# Patient Record
Sex: FEMALE | Race: WHITE | NOT HISPANIC OR LATINO | Employment: OTHER | URBAN - METROPOLITAN AREA
[De-identification: names, ages, dates, MRNs, and addresses within clinical notes are randomized per-mention and may not be internally consistent; named-entity substitution may affect disease eponyms.]

---

## 2019-03-19 DIAGNOSIS — I34.0 MITRAL VALVE INSUFFICIENCY, UNSPECIFIED ETIOLOGY: Primary | ICD-10-CM

## 2019-04-08 ENCOUNTER — HOSPITAL ENCOUNTER (OUTPATIENT)
Dept: CARDIOLOGY | Facility: CLINIC | Age: 64
Discharge: HOME | End: 2019-04-08
Attending: INTERNAL MEDICINE
Payer: MEDICARE

## 2019-04-08 ENCOUNTER — OFFICE VISIT (OUTPATIENT)
Dept: CARDIOLOGY | Facility: CLINIC | Age: 64
End: 2019-04-08
Payer: MEDICARE

## 2019-04-08 VITALS
SYSTOLIC BLOOD PRESSURE: 128 MMHG | BODY MASS INDEX: 26.06 KG/M2 | DIASTOLIC BLOOD PRESSURE: 70 MMHG | HEIGHT: 67 IN | WEIGHT: 166 LBS | HEART RATE: 73 BPM

## 2019-04-08 VITALS
SYSTOLIC BLOOD PRESSURE: 132 MMHG | BODY MASS INDEX: 28.25 KG/M2 | WEIGHT: 169.56 LBS | OXYGEN SATURATION: 98 % | HEIGHT: 65 IN | DIASTOLIC BLOOD PRESSURE: 76 MMHG | HEART RATE: 79 BPM

## 2019-04-08 DIAGNOSIS — Z98.890 S/P MITRAL VALVE REPAIR: ICD-10-CM

## 2019-04-08 DIAGNOSIS — I34.0 MITRAL VALVE INSUFFICIENCY, UNSPECIFIED ETIOLOGY: ICD-10-CM

## 2019-04-08 LAB
ASCENDING AORTA: 3.82 CM
AV INDEX (PROSTH): 0.86
AV MEAN GRADIENT: 2.59 MMHG
AV PEAK GRADIENT: 4.67 MMHG
AV VALVE AREA: 3.89 CM2
AV VELOCITY RATIO: 0.87
BSA FOR ECHO PROCEDURE: 1.89 M2
CV ECHO LV RWT: 0.29 CM
DOP CALC AO PEAK VEL: 1.08 M/S
DOP CALC AO VTI: 21.45 CM
DOP CALC LVOT AREA: 4.52 CM2
DOP CALC LVOT DIAMETER: 2.4 CM
DOP CALC LVOT PEAK VEL: 0.94 M/S
DOP CALC LVOT STROKE VOLUME: 83.47 CM3
DOP CALCLVOT PEAK VEL VTI: 18.46 CM
E WAVE DECELERATION TIME: 297.16 MSEC
E/A RATIO: 1.12
E/E' RATIO: 21.56
ECHO LV POSTERIOR WALL: 0.75 CM (ref 0.6–1.1)
FRACTIONAL SHORTENING: 38 % (ref 28–44)
INTERVENTRICULAR SEPTUM: 0.94 CM (ref 0.6–1.1)
LA MAJOR: 5.7 CM
LA MINOR: 5.7 CM
LA WIDTH: 4.9 CM
LEFT ATRIUM SIZE: 4.61 CM
LEFT ATRIUM VOLUME INDEX: 58.6 ML/M2
LEFT ATRIUM VOLUME: 109.44 CM3
LEFT INTERNAL DIMENSION IN SYSTOLE: 3.17 CM (ref 2.1–4)
LEFT VENTRICLE DIASTOLIC VOLUME INDEX: 66.48 ML/M2
LEFT VENTRICLE DIASTOLIC VOLUME: 124.2 ML
LEFT VENTRICLE MASS INDEX: 80.9 G/M2
LEFT VENTRICLE SYSTOLIC VOLUME INDEX: 21.4 ML/M2
LEFT VENTRICLE SYSTOLIC VOLUME: 39.94 ML
LEFT VENTRICULAR INTERNAL DIMENSION IN DIASTOLE: 5.11 CM (ref 3.5–6)
LEFT VENTRICULAR MASS: 151.19 G
LV LATERAL E/E' RATIO: 19.4
LV SEPTAL E/E' RATIO: 24.25
MV MEAN GRADIENT: 10 MMHG
MV PEAK A VEL: 1.73 M/S
MV PEAK E VEL: 1.94 M/S
MV PEAK GRADIENT: 17 MMHG
PISA TR MAX VEL: 2.93 M/S
PULM VEIN S/D RATIO: 1.08
PV PEAK D VEL: 0.61 M/S
PV PEAK S VEL: 0.66 M/S
RA MAJOR: 3.54 CM
RA PRESSURE: 8 MMHG
RA WIDTH: 3.14 CM
RIGHT VENTRICULAR END-DIASTOLIC DIMENSION: 3.08 CM
RV TISSUE DOPPLER FREE WALL SYSTOLIC VELOCITY 1 (APICAL 4 CHAMBER VIEW): 11.35 M/S
SINUS: 3.4 CM
STJ: 2.95 CM
TDI LATERAL: 0.1
TDI SEPTAL: 0.08
TDI: 0.09
TR MAX PG: 34.34 MMHG
TRICUSPID ANNULAR PLANE SYSTOLIC EXCURSION: 1.9 CM
TV REST PULMONARY ARTERY PRESSURE: 42 MMHG

## 2019-04-08 PROCEDURE — 99204 OFFICE O/P NEW MOD 45 MIN: CPT | Mod: S$PBB,,, | Performed by: INTERNAL MEDICINE

## 2019-04-08 PROCEDURE — 93306 TTE W/DOPPLER COMPLETE: CPT | Mod: PBBFAC | Performed by: INTERNAL MEDICINE

## 2019-04-08 PROCEDURE — 99999 PR PBB SHADOW E&M-EST. PATIENT-LVL IV: ICD-10-PCS | Mod: PBBFAC,,,

## 2019-04-08 PROCEDURE — 99999 PR PBB SHADOW E&M-EST. PATIENT-LVL IV: CPT | Mod: PBBFAC,,,

## 2019-04-08 PROCEDURE — 99214 OFFICE O/P EST MOD 30 MIN: CPT | Mod: PBBFAC,25

## 2019-04-08 PROCEDURE — 99204 PR OFFICE/OUTPT VISIT, NEW, LEVL IV, 45-59 MIN: ICD-10-PCS | Mod: S$PBB,,, | Performed by: INTERNAL MEDICINE

## 2019-04-08 PROCEDURE — 93306 TRANSTHORACIC ECHO (TTE) COMPLETE (CUPID ONLY): ICD-10-PCS | Mod: 26,S$PBB,, | Performed by: INTERNAL MEDICINE

## 2019-04-08 RX ORDER — POTASSIUM CHLORIDE 20 MEQ/1
20 TABLET, EXTENDED RELEASE ORAL DAILY
Refills: 6 | Status: ON HOLD | COMMUNITY
Start: 2019-03-20 | End: 2019-05-02 | Stop reason: HOSPADM

## 2019-04-08 RX ORDER — ACETAMINOPHEN 500 MG
2000 TABLET ORAL DAILY
COMMUNITY

## 2019-04-08 RX ORDER — METOPROLOL SUCCINATE 25 MG/1
25 TABLET, EXTENDED RELEASE ORAL DAILY
Refills: 1 | Status: ON HOLD | COMMUNITY
Start: 2019-03-27 | End: 2019-05-02 | Stop reason: HOSPADM

## 2019-04-08 RX ORDER — MULTIVITAMIN
1 TABLET ORAL DAILY
COMMUNITY

## 2019-04-08 RX ORDER — OMEPRAZOLE 40 MG/1
40 CAPSULE, DELAYED RELEASE ORAL EVERY MORNING
Refills: 1 | COMMUNITY
Start: 2019-01-30

## 2019-04-08 RX ORDER — ASPIRIN 81 MG/1
81 TABLET ORAL DAILY
COMMUNITY

## 2019-04-08 RX ORDER — MELOXICAM 15 MG/1
TABLET ORAL
Refills: 2 | Status: ON HOLD | COMMUNITY
Start: 2019-01-23 | End: 2019-05-02 | Stop reason: HOSPADM

## 2019-04-08 RX ORDER — FLUTICASONE PROPIONATE 50 MCG
SPRAY, SUSPENSION (ML) NASAL
Refills: 0 | COMMUNITY
Start: 2019-01-30

## 2019-04-08 RX ORDER — PROMETHAZINE HYDROCHLORIDE 25 MG/1
25 TABLET ORAL EVERY 6 HOURS PRN
COMMUNITY
Start: 2017-08-07

## 2019-04-08 RX ORDER — DICLOFENAC SODIUM 10 MG/G
2 GEL TOPICAL DAILY
COMMUNITY
Start: 2017-05-16

## 2019-04-08 RX ORDER — FUROSEMIDE 40 MG/1
40 TABLET ORAL 2 TIMES DAILY
Refills: 8 | Status: ON HOLD | COMMUNITY
Start: 2019-03-22 | End: 2019-05-02 | Stop reason: HOSPADM

## 2019-04-08 RX ORDER — TIZANIDINE 4 MG/1
TABLET ORAL
Refills: 6 | COMMUNITY
Start: 2019-01-23

## 2019-04-08 RX ORDER — ESTRADIOL 1 MG/1
1 TABLET ORAL DAILY
Refills: 3 | COMMUNITY
Start: 2019-01-18

## 2019-04-08 RX ORDER — LIDOCAINE 50 MG/G
OINTMENT TOPICAL
Refills: 0 | COMMUNITY
Start: 2019-01-29

## 2019-04-08 RX ORDER — HYDROCODONE BITARTRATE AND ACETAMINOPHEN 10; 325 MG/1; MG/1
1 TABLET ORAL EVERY 4 HOURS PRN
COMMUNITY
Start: 2018-05-16 | End: 2019-04-08

## 2019-04-08 RX ORDER — OXYCODONE AND ACETAMINOPHEN 10; 325 MG/1; MG/1
1 TABLET ORAL
Refills: 0 | Status: ON HOLD | COMMUNITY
Start: 2019-03-29 | End: 2019-05-02 | Stop reason: HOSPADM

## 2019-04-08 NOTE — PROGRESS NOTES
Subjective:    Patient ID:  Ericka Lewis is a 64 y.o. female who presents for evaluation of Mitral Stenosis post Mitral Valve Replacement.    Primary Cardiologist: Dr. Nunez    HPI  Ms. Lewis is a very pleasant lady who underwent mitral valve repair with a 28mm Physio II ring on 5/11/18 for severe MR with P2 prolapse. She states she never felt better after her surgery. She has chronic BLE edema, 2 -3 pillow orthopnea, and frequent coughing and HARE.     Echo today shows mitral valve that is s/p repair with no residual regurgitation but with a mean diastolic gradient of 10mm Hg at a heart rate of 73 bpm. The estimated PA systolic pressure is 42 mm Hg.    Review of Systems   Constitution: Negative for chills, diaphoresis, fever, weight gain and weight loss.   HENT: Negative for sore throat.    Eyes: Negative for blurred vision, vision loss in left eye, vision loss in right eye and visual disturbance.   Cardiovascular: Negative for chest pain, claudication, dyspnea on exertion, leg swelling, near-syncope, orthopnea, palpitations, paroxysmal nocturnal dyspnea and syncope.   Respiratory: Negative for cough, hemoptysis, shortness of breath, sputum production and wheezing.    Endocrine: Negative for cold intolerance and heat intolerance.   Hematologic/Lymphatic: Negative for adenopathy. Does not bruise/bleed easily.   Skin: Negative for rash.   Musculoskeletal: Negative for falls, muscle weakness and myalgias.   Gastrointestinal: Negative for abdominal pain, change in bowel habit, constipation, diarrhea, melena and nausea.   Genitourinary: Negative for bladder incontinence.   Neurological: Negative for dizziness, focal weakness, headaches, light-headedness, numbness and weakness.   Psychiatric/Behavioral: Negative for altered mental status.         Vitals:    04/08/19 1519 04/08/19 1530   BP: (!) 144/79 132/76   BP Location: Right arm Left arm   Patient Position: Sitting Sitting   BP Method: Large (Automatic) Large  "(Automatic)   Pulse: 79 79   SpO2: 98%    Weight: 76.9 kg (169 lb 8.5 oz)    Height: 5' 4.96" (1.65 m)    Body mass index is 28.25 kg/m².    Objective:    Physical Exam   Constitutional: She is oriented to person, place, and time. She appears well-developed and well-nourished. No distress.   HENT:   Head: Normocephalic and atraumatic.   Mouth/Throat: Oropharynx is clear and moist.   Eyes: Pupils are equal, round, and reactive to light. Conjunctivae and EOM are normal. No scleral icterus.   Neck: Neck supple. No JVD present. No tracheal deviation present.   Cardiovascular: Normal rate and regular rhythm. Exam reveals no gallop and no friction rub.   Murmur heard.   Low-pitched rumbling crescendo presystolic murmur is present with a grade of 2/6 at the apex.  Pulmonary/Chest: Effort normal. No respiratory distress. She has decreased breath sounds in the right lower field and the left lower field. She has no wheezes. She has no rales. She exhibits no tenderness.   Abdominal: Soft. Bowel sounds are normal. She exhibits no distension. There is no hepatosplenomegaly. There is no tenderness.   Musculoskeletal: She exhibits edema (1+ BLE edema). She exhibits no tenderness.   Neurological: She is alert and oriented to person, place, and time.   Skin: Skin is warm and dry. No rash noted. No erythema.   Psychiatric: She has a normal mood and affect. Her behavior is normal.         Assessment:         S/P mitral valve repair  S/p 28mm Physio II ring on 5/11/18 for severe MR with P2 prolapse.  Now with severe MR.     Plan:       Dr. Mcintosh will see her in his clinic on Thursday, 4/11 to discuss MVR.     Staff:  I have personally taken the history and examined this patient and agree with the fellow's note as stated above and amended it accordingly :-) This patient has severe MS post mitral valve repair and ring.  She needs MVR.  Dr. Mcintosh saw her in valve clinic with us and will arrange for her to have a preop visit.    "

## 2019-04-09 DIAGNOSIS — Z79.899 ENCOUNTER FOR LONG-TERM (CURRENT) USE OF MEDICATIONS: ICD-10-CM

## 2019-04-09 DIAGNOSIS — Z98.890 S/P MVR (MITRAL VALVE REPAIR): Primary | ICD-10-CM

## 2019-04-09 DIAGNOSIS — Z51.81 MONITORING FOR ANTICOAGULANT USE: ICD-10-CM

## 2019-04-09 DIAGNOSIS — Z79.01 MONITORING FOR ANTICOAGULANT USE: ICD-10-CM

## 2019-04-11 ENCOUNTER — OFFICE VISIT (OUTPATIENT)
Dept: CARDIOTHORACIC SURGERY | Facility: CLINIC | Age: 64
End: 2019-04-11
Payer: MEDICARE

## 2019-04-11 ENCOUNTER — LAB VISIT (OUTPATIENT)
Dept: LAB | Facility: HOSPITAL | Age: 64
End: 2019-04-11
Attending: THORACIC SURGERY (CARDIOTHORACIC VASCULAR SURGERY)
Payer: MEDICARE

## 2019-04-11 ENCOUNTER — DOCUMENTATION ONLY (OUTPATIENT)
Dept: CARDIOTHORACIC SURGERY | Facility: CLINIC | Age: 64
End: 2019-04-11

## 2019-04-11 VITALS
WEIGHT: 166.31 LBS | SYSTOLIC BLOOD PRESSURE: 118 MMHG | HEART RATE: 72 BPM | HEIGHT: 67 IN | TEMPERATURE: 98 F | OXYGEN SATURATION: 98 % | BODY MASS INDEX: 26.1 KG/M2 | DIASTOLIC BLOOD PRESSURE: 75 MMHG

## 2019-04-11 DIAGNOSIS — Z98.890 S/P MVR (MITRAL VALVE REPAIR): Primary | ICD-10-CM

## 2019-04-11 DIAGNOSIS — Z98.890 S/P MVR (MITRAL VALVE REPAIR): ICD-10-CM

## 2019-04-11 PROCEDURE — 99999 PR PBB SHADOW E&M-EST. PATIENT-LVL IV: ICD-10-PCS | Mod: PBBFAC,,, | Performed by: THORACIC SURGERY (CARDIOTHORACIC VASCULAR SURGERY)

## 2019-04-11 PROCEDURE — 99203 OFFICE O/P NEW LOW 30 MIN: CPT | Mod: S$PBB,ICN,CMP, | Performed by: THORACIC SURGERY (CARDIOTHORACIC VASCULAR SURGERY)

## 2019-04-11 PROCEDURE — 81001 URINALYSIS AUTO W/SCOPE: CPT

## 2019-04-11 PROCEDURE — 99214 OFFICE O/P EST MOD 30 MIN: CPT | Mod: PBBFAC | Performed by: THORACIC SURGERY (CARDIOTHORACIC VASCULAR SURGERY)

## 2019-04-11 PROCEDURE — 99203 PR OFFICE/OUTPT VISIT, NEW, LEVL III, 30-44 MIN: ICD-10-PCS | Mod: S$PBB,ICN,CMP, | Performed by: THORACIC SURGERY (CARDIOTHORACIC VASCULAR SURGERY)

## 2019-04-11 PROCEDURE — 99999 PR PBB SHADOW E&M-EST. PATIENT-LVL IV: CPT | Mod: PBBFAC,,, | Performed by: THORACIC SURGERY (CARDIOTHORACIC VASCULAR SURGERY)

## 2019-04-11 NOTE — LETTER
[facility]  [address]  [place] [address]  Phone: [phone] April 11, 2019      Mati Domínguez MD  [address] [place] [address]    Patient: Ericka Lewis   MR Number: [id number]   YOB: 1955   Date of Visit: 4/11/2019     Dear Dr. Domínguez,     It has been a privilege for us to see your patient, Ms. Lewis, at our [facility].  We had a chance to meet with her and went over the history, echocardiographic, as well as angiographic findings in detail.  As you know, she is a 64-year-old woman with a history of mitral valve prolapse s/p mitral valve repair (May 2018) and now with severe mitral stenosis, as well as paroxysmal atrial fibrillation.  Recently, she has been symptomatic with dyspnea on exertion, lower extremity edema, palpitations, and orthopnea symptoms interfering with her quality of life.  Her most recent echocardiogram (4/8/2019) showed 55% ejection fraction, severe mitral stenosis with mean transvalvular gradient of 10mmHg, no mitral regurgitation, trace tricuspid regurgitation, and pulmonary hypertension with estimated systolic PA pressure of 42mmHg.  Angiogram showed non-obstructive coronary artery disease with a right dominant system. CT chest noncontrast showed safe re-entry for reoperation.    We had an extensive discussion with her today regarding the ACC/AHA guidelines and we agreed that she has class I indications for surgery involving reoperative sternotomy, mitral valve replacement (bioprosthesis), possible mitral valve repair, possible tricuspid valve repair, and maze procedure with left atrial appendage removal. We went through the risks and benefits as well as the perioperative expectations and we agreed she is an appropriate surgical candidate.  We have tentatively scheduled her surgery for April 26, 2019, and I will be delighted to contact you around the time of her surgery.    We will  admit her on Sunday April 21, 2019, for medical optimization with diuresis, repeat transthoracic echo, and a right heart catheterization.     Thank you for referring Ms. Lewis and for your trust and confidence in our [facility].    Sincerely,         Yonatan Mcintosh MD  Cardiothoracic Surgery  [facility]    SS/etb

## 2019-04-11 NOTE — PROGRESS NOTES
"PREPARING FOR SURGERY  Your surgery has been scheduled for:   Day: Friday_________ Date:  _April 26th_______  Arrival Time: 5A  Start Time: 7A  You should report to the second floor surgery center, located on the [facility] side of the second floor of the [facility]. The phone number is [phone].     PLEASE NOTE  · If you are allergic to any medications, please inform your doctor or the nurse responsible for your care.  · Tell the doctor if you take aspirin, products containing aspirin, herbal medications or blood thinners, such as Coumadin, Pradaxa, or Plavix.  · Notify your doctor if you are diabetic and provide information about the medications you take.  · Arrange for someone to drive you home following surgery. You will not be allowed to leave the surgical facility alone or drive yourself home following sedation and anesthesia.  · If you have not already done so, please bring a list of your medications with you the day of your surgery.     BEFORE SURGERY  Stop taking all herbal medications 14 days prior to surgery  Stop taking aspirin, products containing aspirin 0 days before surgery  Stop taking blood thinners 5 days before surgery  Refrain from drinking alcohol beverages for 24 hours before and after surgery  Stop or limit smoking 0 days before surgery  Other: ________________________________________________________     THE NIGHT BEFORE SURGERY  Eat a light supper on the night before your surgery, no greasy or fatty foods.  DO NOT EAT OR DRINK ANYTHING AFTER MIDNIGHT, INCLUDING GUM, HARD CANDY, MINTS, OR CHEWING TOBACCO  Take a complete shower. Wash your body from the neck down with Hibiclens (chlorhexidine gluconate) soap. Hibiclens soap may be purchased over the counter at the pharmacy. Keep the soap away from your eyes, ears, and mouth. After washing with Hibiclens, rinse thoroughly. You may also use any soap labeled "antibacterial". Shampoo your hair with your regular shampoo.     THE " DAY OF SURGERY  Take another shower with Hibiclens or any antibacterial soap, to reduce the change of infection.  Medications to take the morning of surgery:  __(patient will be hospitalized prior to surgery)____________ with a small sip of water. Do not take diuretics or fluid pills.  Diabetic medication instructions will be given by the preop center. They will call you before your surgery.  You may brush your teeth and rinse your mouth, but do not shallow any water.  Do not apply perfume, powder, body lotions or deodorant on the day of surgery.  Do not wear any makeup, including mascara and false eyelashes.  Nail polish should be removed.  Wear comfortable clothes, such as button front shirt and loose-fitting pants.  Leave all jewelry, including body piercings and valuables at home.  Hairpins and clasps must be removed before you enter the operating room.  You may wear glasses, dentures and hearing aids before and after surgery. They may need to be removed before going into the operating room. Contact lenses worn before surgery must be removed before entering the operating room. Please bring a case for your hearing aids, glasses and/or contacts.  Bring any devices you will need after surgery such as crutches or canes.  If you have sleep apnea, please bring your CPAP machine.  If you have an implantable device, such as a pacemaker or AICD, please bring the device information card, if you have one.     If you have any questions or concerns, please don't hesitate to call.     Carla Owen RN  RN Clinician  Thoracic and Cardiovascular Surgery  [facility], [place] [id number] [phone] fax

## 2019-04-11 NOTE — PROGRESS NOTES
Subjective:    Patient ID:  Ericka Lewis is a 64 y.o. female who presents for evaluation of Mitral Stenosis post Mitral Valve Replacement.       HPI  Ms. Lewis is a very pleasant lady who underwent mitral valve repair with a 28mm Physio II ring on 5/11/18 for severe MR with P2 prolapse. She states she never felt better after her surgery. She has chronic BLE edema, 2 -3 pillow orthopnea, and frequent coughing and HARE.      Echo today shows mitral valve that is s/p repair with no residual regurgitation but with a mean diastolic gradient of 10mm Hg at a heart rate of 73 bpm. The estimated PA systolic pressure is 42 mm Hg.     Review of Systems   Constitution: Negative for chills, diaphoresis, fever, weight gain and weight loss.   HENT: Negative for sore throat.    Eyes: Negative for blurred vision, vision loss in left eye, vision loss in right eye and visual disturbance.   Cardiovascular: Negative for chest pain, claudication, dyspnea on exertion, leg swelling, near-syncope, orthopnea, palpitations, paroxysmal nocturnal dyspnea and syncope.   Respiratory: Negative for cough, hemoptysis, shortness of breath, sputum production and wheezing.    Endocrine: Negative for cold intolerance and heat intolerance.   Hematologic/Lymphatic: Negative for adenopathy. Does not bruise/bleed easily.   Skin: Negative for rash.   Musculoskeletal: Negative for falls, muscle weakness and myalgias.   Gastrointestinal: Negative for abdominal pain, change in bowel habit, constipation, diarrhea, melena and nausea.   Genitourinary: Negative for bladder incontinence.   Neurological: Negative for dizziness, focal weakness, headaches, light-headedness, numbness and weakness.   Psychiatric/Behavioral: Negative for altered mental status.          Vitals        Vitals:     04/08/19 1519 04/08/19 1530   BP: (!) 144/79 132/76   BP Location: Right arm Left arm   Patient Position: Sitting Sitting   BP Method: Large (Automatic) Large (Automatic)  "  Pulse: 79 79   SpO2: 98%     Weight: 76.9 kg (169 lb 8.5 oz)     Height: 5' 4.96" (1.65 m)        Body mass index is 28.25 kg/m².     Objective:    Physical Exam   Constitutional: She is oriented to person, place, and time. She appears well-developed and well-nourished. No distress.   HENT:   Head: Normocephalic and atraumatic.   Mouth/Throat: Oropharynx is clear and moist.   Eyes: Pupils are equal, round, and reactive to light. Conjunctivae and EOM are normal. No scleral icterus.   Neck: Neck supple. No JVD present. No tracheal deviation present.   Cardiovascular: Normal rate and regular rhythm. Exam reveals no gallop and no friction rub.   Murmur heard.   Low-pitched rumbling crescendo presystolic murmur is present with a grade of 2/6 at the apex.  Pulmonary/Chest: Effort normal. No respiratory distress. She has decreased breath sounds in the right lower field and the left lower field. She has no wheezes. She has no rales. She exhibits no tenderness.   Abdominal: Soft. Bowel sounds are normal. She exhibits no distension. There is no hepatosplenomegaly. There is no tenderness.   Musculoskeletal: She exhibits edema (1+ BLE edema). She exhibits no tenderness.   Neurological: She is alert and oriented to person, place, and time.   Skin: Skin is warm and dry. No rash noted. No erythema.   Psychiatric: She has a normal mood and affect. Her behavior is normal.          Assessment:    S/P mitral valve repair with severe MR  S/p 28mm Physio II ring on 5/11/18 for severe MR with P2 prolapse.    Plan:          I have seen the patient and reviewed the nurse practitioner's note above. I have personally interviewed and examined the patient at bedside and agree with the findings.     Ms. Lewis is a 64 year-old woman with a history of mitral valve prolapse s/p mitral valve repair (May 2018) and now with severe mitral stenosis, as well as paroxysmal atrial fibrillation.  Recently, she has been symptomatic with dyspnea on " exertion, lower extremity edema, and orthopnea symptoms interfering with her quality of life.  Her most recent echocardiogram (4/8/2019) showed 55% ejection fraction, severe mitral stenosis with mean transvalvular gradient of 10mmHg, no mitral regurgitation, trace tricuspid regurgitation, and pulmonary hypertension with estimated systolic PA pressure of 42mmHg.  Angiogram showed non-obstructive coronary artery disease with a right dominant system.  CT chest noncontrast showed safe re-entry for reoperation.    We had an extensive discussion with her today regarding the ACC/AHA guidelines and we agreed that she has class I indications for surgery involving reoperative sternotomy, mitral valve replacement (bioprosthesis), possible mitral valve repair, possible tricuspid valve repair, and maze procedure with left atrial appendage removal.  We went through the risks and benefits as well as the perioperative expectations and we agreed she is an appropriate surgical candidate.  We have tentatively scheduled her surgery for April 26, 2019.    We will admit her on Sunday April 21, 2019, for medical optimization with diuresis, repeat transthoracic echo, and a right heart catheterization.       Yonatan Mcintosh MD  Cardiothoracic Surgery  [facility]

## 2019-04-12 ENCOUNTER — DOCUMENTATION ONLY (OUTPATIENT)
Dept: CARDIOTHORACIC SURGERY | Facility: CLINIC | Age: 64
End: 2019-04-12

## 2019-04-12 LAB
BACTERIA #/AREA URNS AUTO: ABNORMAL /HPF
BILIRUB UR QL STRIP: NEGATIVE
CLARITY UR REFRACT.AUTO: CLEAR
COLOR UR AUTO: YELLOW
GLUCOSE UR QL STRIP: NEGATIVE
HGB UR QL STRIP: ABNORMAL
HYALINE CASTS UR QL AUTO: 1 /LPF
KETONES UR QL STRIP: NEGATIVE
LEUKOCYTE ESTERASE UR QL STRIP: NEGATIVE
MICROSCOPIC COMMENT: ABNORMAL
NITRITE UR QL STRIP: NEGATIVE
PH UR STRIP: 5 [PH] (ref 5–8)
PROT UR QL STRIP: NEGATIVE
RBC #/AREA URNS AUTO: 1 /HPF (ref 0–4)
SP GR UR STRIP: 1.01 (ref 1–1.03)
SQUAMOUS #/AREA URNS AUTO: 2 /HPF
URN SPEC COLLECT METH UR: ABNORMAL
WBC #/AREA URNS AUTO: 2 /HPF (ref 0–5)
YEAST UR QL AUTO: ABNORMAL

## 2019-04-15 ENCOUNTER — TELEPHONE (OUTPATIENT)
Dept: CARDIOTHORACIC SURGERY | Facility: CLINIC | Age: 64
End: 2019-04-15

## 2019-04-15 NOTE — TELEPHONE ENCOUNTER
Patient has LHC and TAMAR discs in her possession, will bring them to the hospital when she is admitted on 4-21.  Will also have reports faxed to CTS clinic if they are not on discs.----- Message from Juan Pablo Ngo sent at 4/15/2019  2:50 PM CDT -----  Contact: Pt  Needs Advice    Reason for call: pt calling to speak with jamee regarding paperwork she was asked to gather. Pt wants to ensure she has gotten the correct paperwork and also if she needs to send in the information or bring it with her.         Communication Preference: [phone]     Additional Information: please contact pt regarding this matter

## 2019-04-21 ENCOUNTER — HOSPITAL ENCOUNTER (INPATIENT)
Facility: HOSPITAL | Age: 64
LOS: 11 days | Discharge: HOME | DRG: 217 | End: 2019-05-02
Attending: THORACIC SURGERY (CARDIOTHORACIC VASCULAR SURGERY) | Admitting: THORACIC SURGERY (CARDIOTHORACIC VASCULAR SURGERY)
Payer: MEDICARE

## 2019-04-21 DIAGNOSIS — Z98.890 HISTORY OF HEART SURGERY: ICD-10-CM

## 2019-04-21 DIAGNOSIS — Z98.890 S/P MITRAL VALVE REPAIR: ICD-10-CM

## 2019-04-21 DIAGNOSIS — Z95.2 HEART VALVE REPLACED: ICD-10-CM

## 2019-04-21 DIAGNOSIS — I34.0 MITRAL VALVE INSUFFICIENCY, UNSPECIFIED ETIOLOGY: ICD-10-CM

## 2019-04-21 DIAGNOSIS — Z01.818 PRE-OP EVALUATION: ICD-10-CM

## 2019-04-21 DIAGNOSIS — I49.9 ARRHYTHMIA: ICD-10-CM

## 2019-04-21 DIAGNOSIS — R73.9 HYPERGLYCEMIA: ICD-10-CM

## 2019-04-21 DIAGNOSIS — Z98.890 S/P MVR (MITRAL VALVE REPAIR): ICD-10-CM

## 2019-04-21 DIAGNOSIS — I05.0 MITRAL VALVE STENOSIS, UNSPECIFIED ETIOLOGY: ICD-10-CM

## 2019-04-21 DIAGNOSIS — I34.0 MITRAL REGURGITATION: Primary | ICD-10-CM

## 2019-04-21 DIAGNOSIS — I49.9 CARDIAC ARRHYTHMIA, UNSPECIFIED CARDIAC ARRHYTHMIA TYPE: ICD-10-CM

## 2019-04-21 DIAGNOSIS — I05.0 MITRAL STENOSIS: ICD-10-CM

## 2019-04-21 LAB
ANION GAP SERPL CALC-SCNC: 12 MMOL/L (ref 8–16)
APTT BLDCRRT: 24.2 SEC (ref 21–32)
BASOPHILS # BLD AUTO: 0.04 K/UL (ref 0–0.2)
BASOPHILS NFR BLD: 0.9 % (ref 0–1.9)
BUN SERPL-MCNC: 12 MG/DL (ref 8–23)
CALCIUM SERPL-MCNC: 9.4 MG/DL (ref 8.7–10.5)
CHLORIDE SERPL-SCNC: 103 MMOL/L (ref 95–110)
CO2 SERPL-SCNC: 26 MMOL/L (ref 23–29)
CREAT SERPL-MCNC: 0.7 MG/DL (ref 0.5–1.4)
DIFFERENTIAL METHOD: NORMAL
EOSINOPHIL # BLD AUTO: 0.1 K/UL (ref 0–0.5)
EOSINOPHIL NFR BLD: 1.5 % (ref 0–8)
ERYTHROCYTE [DISTWIDTH] IN BLOOD BY AUTOMATED COUNT: 12.3 % (ref 11.5–14.5)
EST. GFR  (AFRICAN AMERICAN): >60 ML/MIN/1.73 M^2
EST. GFR  (NON AFRICAN AMERICAN): >60 ML/MIN/1.73 M^2
GLUCOSE SERPL-MCNC: 94 MG/DL (ref 70–110)
HCT VFR BLD AUTO: 44.1 % (ref 37–48.5)
HGB BLD-MCNC: 14.2 G/DL (ref 12–16)
IMM GRANULOCYTES # BLD AUTO: 0.01 K/UL (ref 0–0.04)
IMM GRANULOCYTES NFR BLD AUTO: 0.2 % (ref 0–0.5)
INR PPP: 0.9 (ref 0.8–1.2)
LYMPHOCYTES # BLD AUTO: 1.2 K/UL (ref 1–4.8)
LYMPHOCYTES NFR BLD: 26.7 % (ref 18–48)
MAGNESIUM SERPL-MCNC: 2 MG/DL (ref 1.6–2.6)
MCH RBC QN AUTO: 30.5 PG (ref 27–31)
MCHC RBC AUTO-ENTMCNC: 32.2 G/DL (ref 32–36)
MCV RBC AUTO: 95 FL (ref 82–98)
MONOCYTES # BLD AUTO: 0.3 K/UL (ref 0.3–1)
MONOCYTES NFR BLD: 6.4 % (ref 4–15)
NEUTROPHILS # BLD AUTO: 2.9 K/UL (ref 1.8–7.7)
NEUTROPHILS NFR BLD: 64.3 % (ref 38–73)
NRBC BLD-RTO: 0 /100 WBC
PHOSPHATE SERPL-MCNC: 3.1 MG/DL (ref 2.7–4.5)
PLATELET # BLD AUTO: 155 K/UL (ref 150–350)
PMV BLD AUTO: 11.3 FL (ref 9.2–12.9)
POTASSIUM SERPL-SCNC: 3.9 MMOL/L (ref 3.5–5.1)
PROTHROMBIN TIME: 9.6 SEC (ref 9–12.5)
RBC # BLD AUTO: 4.66 M/UL (ref 4–5.4)
SODIUM SERPL-SCNC: 141 MMOL/L (ref 136–145)
WBC # BLD AUTO: 4.54 K/UL (ref 3.9–12.7)

## 2019-04-21 PROCEDURE — 85610 PROTHROMBIN TIME: CPT

## 2019-04-21 PROCEDURE — 83735 ASSAY OF MAGNESIUM: CPT

## 2019-04-21 PROCEDURE — 85025 COMPLETE CBC W/AUTO DIFF WBC: CPT

## 2019-04-21 PROCEDURE — 20600001 HC STEP DOWN PRIVATE ROOM

## 2019-04-21 PROCEDURE — 36415 COLL VENOUS BLD VENIPUNCTURE: CPT

## 2019-04-21 PROCEDURE — 63600175 PHARM REV CODE 636 W HCPCS: Performed by: STUDENT IN AN ORGANIZED HEALTH CARE EDUCATION/TRAINING PROGRAM

## 2019-04-21 PROCEDURE — 25000003 PHARM REV CODE 250: Performed by: STUDENT IN AN ORGANIZED HEALTH CARE EDUCATION/TRAINING PROGRAM

## 2019-04-21 PROCEDURE — 80048 BASIC METABOLIC PNL TOTAL CA: CPT

## 2019-04-21 PROCEDURE — 85730 THROMBOPLASTIN TIME PARTIAL: CPT

## 2019-04-21 PROCEDURE — 84100 ASSAY OF PHOSPHORUS: CPT

## 2019-04-21 RX ORDER — POTASSIUM CHLORIDE 20 MEQ/1
20 TABLET, EXTENDED RELEASE ORAL DAILY
Status: DISCONTINUED | OUTPATIENT
Start: 2019-04-21 | End: 2019-04-21

## 2019-04-21 RX ORDER — FLUTICASONE PROPIONATE 50 MCG
1 SPRAY, SUSPENSION (ML) NASAL
Status: DISCONTINUED | OUTPATIENT
Start: 2019-04-21 | End: 2019-04-26

## 2019-04-21 RX ORDER — PANTOPRAZOLE SODIUM 40 MG/1
40 TABLET, DELAYED RELEASE ORAL DAILY
Status: DISCONTINUED | OUTPATIENT
Start: 2019-04-22 | End: 2019-04-26

## 2019-04-21 RX ORDER — POTASSIUM CHLORIDE 20 MEQ/1
20 TABLET, EXTENDED RELEASE ORAL DAILY
Status: DISCONTINUED | OUTPATIENT
Start: 2019-04-22 | End: 2019-04-22

## 2019-04-21 RX ORDER — POLYETHYLENE GLYCOL 3350 17 G/17G
17 POWDER, FOR SOLUTION ORAL DAILY
Status: DISCONTINUED | OUTPATIENT
Start: 2019-04-21 | End: 2019-04-26

## 2019-04-21 RX ORDER — ONDANSETRON 8 MG/1
8 TABLET, ORALLY DISINTEGRATING ORAL EVERY 8 HOURS PRN
Status: DISCONTINUED | OUTPATIENT
Start: 2019-04-21 | End: 2019-04-26

## 2019-04-21 RX ORDER — ACETAMINOPHEN 500 MG
2000 TABLET ORAL DAILY
Status: DISCONTINUED | OUTPATIENT
Start: 2019-04-21 | End: 2019-04-21

## 2019-04-21 RX ORDER — ASPIRIN 81 MG/1
81 TABLET ORAL DAILY
Status: DISCONTINUED | OUTPATIENT
Start: 2019-04-21 | End: 2019-04-26

## 2019-04-21 RX ORDER — CHOLECALCIFEROL (VITAMIN D3) 25 MCG
2000 TABLET ORAL DAILY
Status: DISCONTINUED | OUTPATIENT
Start: 2019-04-21 | End: 2019-04-21

## 2019-04-21 RX ORDER — METOPROLOL TARTRATE 25 MG/1
12.5 TABLET ORAL 2 TIMES DAILY
Status: DISCONTINUED | OUTPATIENT
Start: 2019-04-22 | End: 2019-04-26

## 2019-04-21 RX ORDER — FUROSEMIDE 10 MG/ML
20 INJECTION INTRAMUSCULAR; INTRAVENOUS 2 TIMES DAILY
Status: DISCONTINUED | OUTPATIENT
Start: 2019-04-21 | End: 2019-04-22

## 2019-04-21 RX ORDER — PANTOPRAZOLE SODIUM 40 MG/1
40 TABLET, DELAYED RELEASE ORAL DAILY
Status: DISCONTINUED | OUTPATIENT
Start: 2019-04-21 | End: 2019-04-21

## 2019-04-21 RX ORDER — ACETAMINOPHEN 325 MG/1
650 TABLET ORAL EVERY 4 HOURS PRN
Status: DISCONTINUED | OUTPATIENT
Start: 2019-04-21 | End: 2019-04-26

## 2019-04-21 RX ORDER — METOPROLOL TARTRATE 25 MG/1
12.5 TABLET ORAL 2 TIMES DAILY
Status: DISCONTINUED | OUTPATIENT
Start: 2019-04-21 | End: 2019-04-21

## 2019-04-21 RX ORDER — OXYCODONE AND ACETAMINOPHEN 5; 325 MG/1; MG/1
1 TABLET ORAL EVERY 8 HOURS PRN
Status: DISCONTINUED | OUTPATIENT
Start: 2019-04-21 | End: 2019-04-26

## 2019-04-21 RX ORDER — CHOLECALCIFEROL (VITAMIN D3) 25 MCG
2000 TABLET ORAL DAILY
Status: DISCONTINUED | OUTPATIENT
Start: 2019-04-22 | End: 2019-04-26

## 2019-04-21 RX ORDER — SODIUM CHLORIDE 0.9 % (FLUSH) 0.9 %
10 SYRINGE (ML) INJECTION
Status: DISCONTINUED | OUTPATIENT
Start: 2019-04-21 | End: 2019-04-26

## 2019-04-21 RX ADMIN — ASPIRIN 81 MG: 81 TABLET, COATED ORAL at 12:04

## 2019-04-21 RX ADMIN — FUROSEMIDE 20 MG: 10 INJECTION, SOLUTION INTRAMUSCULAR; INTRAVENOUS at 05:04

## 2019-04-21 RX ADMIN — OXYCODONE HYDROCHLORIDE AND ACETAMINOPHEN 1 TABLET: 5; 325 TABLET ORAL at 08:04

## 2019-04-21 RX ADMIN — MILRINONE LACTATE 0.12 MCG/KG/MIN: 1 INJECTION, SOLUTION INTRAVENOUS at 02:04

## 2019-04-21 NOTE — PLAN OF CARE
Problem: Adult Inpatient Plan of Care  Goal: Plan of Care Review  Outcome: Ongoing (interventions implemented as appropriate)  Patient free of falls/traumas/injuries. Milrinone gtts initiated and infusing at 0.125 mcg/kg/minute. Skin clean, dry, and intact. Patient educated on plan of care and verbalized understanding. Patients VS stable and no distress. Will continue to monitor.

## 2019-04-21 NOTE — ASSESSMENT & PLAN NOTE
64 year-old woman with a history of mitral valve prolapse s/p mitral valve repair (May 2018) and now with severe mitral stenosis, as well as paroxysmal atrial fibrillation.  Echocardiogram (4/8/2019) showed 55% ejection fraction, severe mitral stenosis with mean transvalvular gradient of 10mmHg, no mitral regurgitation, trace tricuspid regurgitation, and pulmonary hypertension with estimated systolic PA pressure of 42mmHg.      -Admit to CTS for pre-operative workup/optomization. Schedule for redo-sterotomy with mitral valve replacement (bioprosthesis), possible mitral valve repair, possible tricuspid valve repair, and maze procedure with left atrial appendage removal.    -Cardiac diet, 1500 fluid restriction  -Home medications restarted  -Milrinone 0.125   -Lasix 20mg BID IV   -CBC, BMP, Mag, Phos, Coags pending, and daily   -CXR pending, and daily   -DVT and GI ppx  -Discussed plan with staff.

## 2019-04-21 NOTE — Clinical Note
The PA catheter is inserted into the main pulmonary artery. Hemodynamics performed. Oxygen saturation obtained at 66%.

## 2019-04-21 NOTE — H&P
[facility]  Cardiothoracic Surgery  History & Physical    Patient Name: Ericka Lewis  MRN: [id number]  Admission Date: 4/21/2019  Attending Physician: Yonatan Mcintosh MD  Referring Provider: Yonatan Mcintosh MD    Patient information was obtained from patient and clinic note     Subjective:     Chief Complaint/Reason for Admission: Severe mitral stenosis     History of Present Illness: Ms. Lewis is a 64 year-old woman with a history of mitral valve prolapse s/p mitral valve repair (May 2018) and now with severe mitral stenosis, as well as paroxysmal atrial fibrillation.  Recently, she has been symptomatic with dyspnea on exertion, lower extremity edema, and orthopnea symptoms interfering with her quality of life.  Her most recent echocardiogram (4/8/2019) showed 55% ejection fraction, severe mitral stenosis with mean transvalvular gradient of 10mmHg, no mitral regurgitation, trace tricuspid regurgitation, and pulmonary hypertension with estimated systolic PA pressure of 42mmHg.  Angiogram showed non-obstructive coronary artery disease with a right dominant system.  CT chest noncontrast showed safe re-entry for reoperation.             No current facility-administered medications on file prior to encounter.      Current Outpatient Medications on File Prior to Encounter   Medication Sig    aspirin (ECOTRIN) 81 MG EC tablet Take 81 mg by mouth once daily.     cholecalciferol, vitamin D3, (VITAMIN D3) 2,000 unit Cap Take 2,000 Units by mouth once daily.     diclofenac sodium (VOLTAREN) 1 % Gel Apply 2 g topically once daily.     estradiol (ESTRACE) 1 MG tablet Take 1 mg by mouth once daily.     fluticasone (FLONASE) 50 mcg/actuation nasal spray SHAKE LQ AND U 1 SPR IEN D    furosemide (LASIX) 40 MG tablet Take 40 mg by mouth 2 (two) times daily.    lidocaine (XYLOCAINE) 5 % Oint ointment REGINALDO EXT AA D    meloxicam (MOBIC) 15 MG tablet TK 1 T PO D    metoprolol succinate (TOPROL-XL)  25 MG 24 hr tablet Take 25 mg by mouth once daily.    multivitamin (THERAGRAN) per tablet Take 1 tablet by mouth once daily.     omeprazole (PRILOSEC) 40 MG capsule Take 40 mg by mouth every morning.     oxyCODONE-acetaminophen (PERCOCET)  mg per tablet Take 1 tablet by mouth as needed.     potassium chloride SA (K-DUR,KLOR-CON) 20 MEQ tablet Take 20 mEq by mouth once daily.     promethazine (PHENERGAN) 25 MG tablet Take 25 mg by mouth every 6 (six) hours as needed.    tiZANidine (ZANAFLEX) 4 MG tablet TK 1 T PO QID PRF SPASM       Review of patient's allergies indicates:   Allergen Reactions    Kiwi Anaphylaxis    Metronidazole Anaphylaxis, Hives and Other (See Comments)     BREATHING ISSUES AND HIVES         Past Medical History:   Diagnosis Date    Heart murmur     Hyperlipidemia     Hypertension      Past Surgical History:   Procedure Laterality Date    CARDIAC CATHETERIZATION       Family History     Problem Relation (Age of Onset)    Arrhythmia Father    Diabetes Mother    Heart disease Mother, Sister    Hypertension Mother, Father    Liver disease Sister    No Known Problems Brother, Maternal Aunt, Maternal Uncle, Paternal Aunt, Paternal Uncle, Maternal Grandmother, Maternal Grandfather, Paternal Grandmother, Paternal Grandfather    Stroke Mother        Tobacco Use    Smoking status: Former Smoker    Smokeless tobacco: Never Used   Substance and Sexual Activity    Alcohol use: Not on file     Comment: social    Drug use: Never    Sexual activity: Not on file     Review of Systems   Constitutional: Negative for activity change, appetite change, chills, fever and unexpected weight change.   Respiratory: Negative for cough, chest tightness and shortness of breath.    Cardiovascular: Positive for leg swelling. Negative for chest pain and palpitations.   Gastrointestinal: Negative for abdominal distention, abdominal pain, constipation, diarrhea, nausea and vomiting.        Bloating     Genitourinary: Negative for difficulty urinating.   Skin: Negative for color change, pallor and wound.   Neurological: Negative for dizziness.   Hematological: Negative for adenopathy. Does not bruise/bleed easily.     Objective:     Vital Signs (Most Recent):  Temp: 97.7 °F (36.5 °C) (04/21/19 1104)  Pulse: 78 (04/21/19 1132)  Resp: 18 (04/21/19 1104)  BP: 110/77 (04/21/19 1104)  SpO2: 97 % (04/21/19 1104) Vital Signs (24h Range):  Temp:  [97.7 °F (36.5 °C)] 97.7 °F (36.5 °C)  Pulse:  [73-78] 78  Resp:  [18] 18  SpO2:  [97 %] 97 %  BP: (110)/(77) 110/77        There is no height or weight on file to calculate BMI.    SpO2: 97 %  O2 Device (Oxygen Therapy): room air     Intake/Output - Last 3 Shifts     None           Lines/Drains/Airways          None          Physical Exam   Constitutional: She is oriented to person, place, and time. She appears well-developed and well-nourished. No distress.   Neck: Normal range of motion.   Cardiovascular: Normal rate and regular rhythm.   Midline sternotomy incision well healed    Pulmonary/Chest: Effort normal. No respiratory distress.   Abdominal: Soft. She exhibits no distension. There is no tenderness.   Musculoskeletal: Normal range of motion. She exhibits no deformity.   Bilateral enema +1    Neurological: She is alert and oriented to person, place, and time.   Skin: Skin is warm and dry.        Significant Labs:  Cardiac markers: No results for input(s): CKMB, CPKMB, TROPONINT, TROPONINI, MYOGLOBIN in the last 48 hours.  CBC: No results for input(s): WBC, RBC, HGB, HCT, PLT, MCV, MCH, MCHC in the last 48 hours.  CMP: No results for input(s): GLU, CALCIUM, ALBUMIN, PROT, NA, K, CO2, CL, BUN, CREATININE, ALKPHOS, ALT, AST, BILITOT in the last 48 hours.  Coagulation: No results for input(s): PT, INR, APTT in the last 48 hours.    Significant Diagnostics:  CXR: I have reviewed all pertinent results/findings within the past 24 hours    Assessment/Plan:     Mitral  regurgitation  64 year-old woman with a history of mitral valve prolapse s/p mitral valve repair (May 2018) and now with severe mitral stenosis, as well as paroxysmal atrial fibrillation.  Echocardiogram (4/8/2019) showed 55% ejection fraction, severe mitral stenosis with mean transvalvular gradient of 10mmHg, no mitral regurgitation, trace tricuspid regurgitation, and pulmonary hypertension with estimated systolic PA pressure of 42mmHg.      -Admit to CTS for pre-operative workup/optomization. Schedule for redo-sterotomy with mitral valve replacement (bioprosthesis), possible mitral valve repair, possible tricuspid valve repair, and maze procedure with left atrial appendage removal.    -Cardiac diet, 1500 fluid restriction  -Home medications restarted  -Milrinone 0.125   -Lasix 20mg BID IV   -CBC, BMP, Mag, Phos, Coags pending, and daily   -CXR pending, and daily   -DVT and GI ppx  -Discussed plan with staff.       Criss Rodriguez MD  Cardiothoracic Surgery  [facility]

## 2019-04-21 NOTE — HPI
Ms. Lewis is a 64 year-old woman with a history of mitral valve prolapse s/p mitral valve repair (May 2018) and now with severe mitral stenosis, as well as paroxysmal atrial fibrillation.  Recently, she has been symptomatic with dyspnea on exertion, lower extremity edema, and orthopnea symptoms interfering with her quality of life.  Her most recent echocardiogram (4/8/2019) showed 55% ejection fraction, severe mitral stenosis with mean transvalvular gradient of 10mmHg, no mitral regurgitation, trace tricuspid regurgitation, and pulmonary hypertension with estimated systolic PA pressure of 42mmHg.  Angiogram showed non-obstructive coronary artery disease with a right dominant system.  CT chest noncontrast showed safe re-entry for reoperation.  We had an extensive discussion with her regarding the ACC/AHA guidelines and we agreed that she has class I indications for surgery involving reoperative sternotomy, mitral valve replacement (bioprosthesis), possible mitral valve repair, possible tricuspid valve repair, and maze procedure with left atrial appendage removal.  The risks and benefits were explained and informed consent was obtained.

## 2019-04-21 NOTE — SUBJECTIVE & OBJECTIVE
No current facility-administered medications on file prior to encounter.      Current Outpatient Medications on File Prior to Encounter   Medication Sig    aspirin (ECOTRIN) 81 MG EC tablet Take 81 mg by mouth once daily.     cholecalciferol, vitamin D3, (VITAMIN D3) 2,000 unit Cap Take 2,000 Units by mouth once daily.     diclofenac sodium (VOLTAREN) 1 % Gel Apply 2 g topically once daily.     estradiol (ESTRACE) 1 MG tablet Take 1 mg by mouth once daily.     fluticasone (FLONASE) 50 mcg/actuation nasal spray SHAKE LQ AND U 1 SPR IEN D    furosemide (LASIX) 40 MG tablet Take 40 mg by mouth 2 (two) times daily.    lidocaine (XYLOCAINE) 5 % Oint ointment REGINALDO EXT AA D    meloxicam (MOBIC) 15 MG tablet TK 1 T PO D    metoprolol succinate (TOPROL-XL) 25 MG 24 hr tablet Take 25 mg by mouth once daily.    multivitamin (THERAGRAN) per tablet Take 1 tablet by mouth once daily.     omeprazole (PRILOSEC) 40 MG capsule Take 40 mg by mouth every morning.     oxyCODONE-acetaminophen (PERCOCET)  mg per tablet Take 1 tablet by mouth as needed.     potassium chloride SA (K-DUR,KLOR-CON) 20 MEQ tablet Take 20 mEq by mouth once daily.     promethazine (PHENERGAN) 25 MG tablet Take 25 mg by mouth every 6 (six) hours as needed.    tiZANidine (ZANAFLEX) 4 MG tablet TK 1 T PO QID PRF SPASM       Review of patient's allergies indicates:   Allergen Reactions    Kiwi Anaphylaxis    Metronidazole Anaphylaxis, Hives and Other (See Comments)     BREATHING ISSUES AND HIVES         Past Medical History:   Diagnosis Date    Heart murmur     Hyperlipidemia     Hypertension      Past Surgical History:   Procedure Laterality Date    CARDIAC CATHETERIZATION       Family History     Problem Relation (Age of Onset)    Arrhythmia Father    Diabetes Mother    Heart disease Mother, Sister    Hypertension Mother, Father    Liver disease Sister    No Known Problems Brother, Maternal Aunt, Maternal Uncle, Paternal Aunt, Paternal  Uncle, Maternal Grandmother, Maternal Grandfather, Paternal Grandmother, Paternal Grandfather    Stroke Mother        Tobacco Use    Smoking status: Former Smoker    Smokeless tobacco: Never Used   Substance and Sexual Activity    Alcohol use: Not on file     Comment: social    Drug use: Never    Sexual activity: Not on file     Review of Systems   Constitutional: Negative for activity change, appetite change, chills, fever and unexpected weight change.   Respiratory: Negative for cough, chest tightness and shortness of breath.    Cardiovascular: Positive for leg swelling. Negative for chest pain and palpitations.   Gastrointestinal: Negative for abdominal distention, abdominal pain, constipation, diarrhea, nausea and vomiting.        Bloating    Genitourinary: Negative for difficulty urinating.   Skin: Negative for color change, pallor and wound.   Neurological: Negative for dizziness.   Hematological: Negative for adenopathy. Does not bruise/bleed easily.     Objective:     Vital Signs (Most Recent):  Temp: 97.7 °F (36.5 °C) (04/21/19 1104)  Pulse: 78 (04/21/19 1132)  Resp: 18 (04/21/19 1104)  BP: 110/77 (04/21/19 1104)  SpO2: 97 % (04/21/19 1104) Vital Signs (24h Range):  Temp:  [97.7 °F (36.5 °C)] 97.7 °F (36.5 °C)  Pulse:  [73-78] 78  Resp:  [18] 18  SpO2:  [97 %] 97 %  BP: (110)/(77) 110/77        There is no height or weight on file to calculate BMI.    SpO2: 97 %  O2 Device (Oxygen Therapy): room air     Intake/Output - Last 3 Shifts     None           Lines/Drains/Airways          None          Physical Exam   Constitutional: She is oriented to person, place, and time. She appears well-developed and well-nourished. No distress.   Neck: Normal range of motion.   Cardiovascular: Normal rate and regular rhythm.   Midline sternotomy incision well healed    Pulmonary/Chest: Effort normal. No respiratory distress.   Abdominal: Soft. She exhibits no distension. There is no tenderness.   Musculoskeletal:  Normal range of motion. She exhibits no deformity.   Bilateral enema +1    Neurological: She is alert and oriented to person, place, and time.   Skin: Skin is warm and dry.        Significant Labs:  Cardiac markers: No results for input(s): CKMB, CPKMB, TROPONINT, TROPONINI, MYOGLOBIN in the last 48 hours.  CBC: No results for input(s): WBC, RBC, HGB, HCT, PLT, MCV, MCH, MCHC in the last 48 hours.  CMP: No results for input(s): GLU, CALCIUM, ALBUMIN, PROT, NA, K, CO2, CL, BUN, CREATININE, ALKPHOS, ALT, AST, BILITOT in the last 48 hours.  Coagulation: No results for input(s): PT, INR, APTT in the last 48 hours.    Significant Diagnostics:  CXR: I have reviewed all pertinent results/findings within the past 24 hours

## 2019-04-21 NOTE — HOSPITAL COURSE
On 4/26/19, the patient was taken to the Operating Room for the above stated procedure. Please see the previously dictated operative report for complete details. Postoperatively, the patient was taken from the  Operating Room to the ICU where the vital signs were monitored and pain was kept under control. The patient was weaned from the drips and extubated in the ICU per protocol. Once hemodynamically stable, the patient was transferred to the Cardiac Step-Down floor for continued strengthening and ambulation. On postoperative day 6, the patient was ready for discharge to home. At the time of discharge, the patient was ambulating unassisted. Pain was well controlled with oral analgesics and the patient was tolerating the diet.     MOBILITY AND ACTIVITY: As tolerated. Patient may shower. No heavy lifting of greater than 5 pounds and no driving.     DIET: An 1800-calorie ADA with a 1500 mL fluid restriction.     WOUND CARE INSTRUCTIONS: Check for redness, swelling and drainage around the  incision or wound. Patient is to call for any obvious bleeding, drainage, pus from the wound, unusual problems or difficulties or temperature of greater than 101   degrees.     FOLLOWUP: Follow up with Dr. Mcintosh in approximately 6 weeks. Prior to this  appointment, the patient will have a EKG.     Patient not placed on Ace-Inhibitor at the time of discharge due to potential for hypotension. Pt could not tolerate beat blocker due bradycardia.     DISCHARGE CONDITION: At the time of discharge, the patient was in sinus rhythm and afebrile with stable vital signs.

## 2019-04-22 LAB
ANION GAP SERPL CALC-SCNC: 8 MMOL/L (ref 8–16)
BASOPHILS # BLD AUTO: 0.04 K/UL (ref 0–0.2)
BASOPHILS NFR BLD: 1 % (ref 0–1.9)
BUN SERPL-MCNC: 14 MG/DL (ref 8–23)
CALCIUM SERPL-MCNC: 8.8 MG/DL (ref 8.7–10.5)
CHLORIDE SERPL-SCNC: 100 MMOL/L (ref 95–110)
CO2 SERPL-SCNC: 29 MMOL/L (ref 23–29)
CREAT SERPL-MCNC: 0.7 MG/DL (ref 0.5–1.4)
DIFFERENTIAL METHOD: NORMAL
EOSINOPHIL # BLD AUTO: 0.2 K/UL (ref 0–0.5)
EOSINOPHIL NFR BLD: 4.9 % (ref 0–8)
ERYTHROCYTE [DISTWIDTH] IN BLOOD BY AUTOMATED COUNT: 12.3 % (ref 11.5–14.5)
EST. GFR  (AFRICAN AMERICAN): >60 ML/MIN/1.73 M^2
EST. GFR  (NON AFRICAN AMERICAN): >60 ML/MIN/1.73 M^2
GLUCOSE SERPL-MCNC: 93 MG/DL (ref 70–110)
HCT VFR BLD AUTO: 38.7 % (ref 37–48.5)
HGB BLD-MCNC: 12.6 G/DL (ref 12–16)
IMM GRANULOCYTES # BLD AUTO: 0 K/UL (ref 0–0.04)
IMM GRANULOCYTES NFR BLD AUTO: 0 % (ref 0–0.5)
LYMPHOCYTES # BLD AUTO: 1.5 K/UL (ref 1–4.8)
LYMPHOCYTES NFR BLD: 36.2 % (ref 18–48)
MAGNESIUM SERPL-MCNC: 2 MG/DL (ref 1.6–2.6)
MCH RBC QN AUTO: 30.7 PG (ref 27–31)
MCHC RBC AUTO-ENTMCNC: 32.6 G/DL (ref 32–36)
MCV RBC AUTO: 94 FL (ref 82–98)
MONOCYTES # BLD AUTO: 0.5 K/UL (ref 0.3–1)
MONOCYTES NFR BLD: 12.5 % (ref 4–15)
NEUTROPHILS # BLD AUTO: 1.9 K/UL (ref 1.8–7.7)
NEUTROPHILS NFR BLD: 45.4 % (ref 38–73)
NRBC BLD-RTO: 0 /100 WBC
PHOSPHATE SERPL-MCNC: 3.1 MG/DL (ref 2.7–4.5)
PLATELET # BLD AUTO: 160 K/UL (ref 150–350)
PMV BLD AUTO: 11.5 FL (ref 9.2–12.9)
POCT GLUCOSE: 107 MG/DL (ref 70–110)
POTASSIUM SERPL-SCNC: 3.5 MMOL/L (ref 3.5–5.1)
RBC # BLD AUTO: 4.1 M/UL (ref 4–5.4)
SODIUM SERPL-SCNC: 137 MMOL/L (ref 136–145)
WBC # BLD AUTO: 4.09 K/UL (ref 3.9–12.7)

## 2019-04-22 PROCEDURE — 85025 COMPLETE CBC W/AUTO DIFF WBC: CPT

## 2019-04-22 PROCEDURE — 84100 ASSAY OF PHOSPHORUS: CPT

## 2019-04-22 PROCEDURE — 25000003 PHARM REV CODE 250: Performed by: STUDENT IN AN ORGANIZED HEALTH CARE EDUCATION/TRAINING PROGRAM

## 2019-04-22 PROCEDURE — 83735 ASSAY OF MAGNESIUM: CPT

## 2019-04-22 PROCEDURE — 20600001 HC STEP DOWN PRIVATE ROOM

## 2019-04-22 PROCEDURE — 63600175 PHARM REV CODE 636 W HCPCS: Performed by: STUDENT IN AN ORGANIZED HEALTH CARE EDUCATION/TRAINING PROGRAM

## 2019-04-22 PROCEDURE — 25000003 PHARM REV CODE 250: Performed by: THORACIC SURGERY (CARDIOTHORACIC VASCULAR SURGERY)

## 2019-04-22 PROCEDURE — 36415 COLL VENOUS BLD VENIPUNCTURE: CPT

## 2019-04-22 PROCEDURE — 63600175 PHARM REV CODE 636 W HCPCS: Performed by: THORACIC SURGERY (CARDIOTHORACIC VASCULAR SURGERY)

## 2019-04-22 PROCEDURE — 80048 BASIC METABOLIC PNL TOTAL CA: CPT

## 2019-04-22 RX ORDER — BUTALBITAL, ACETAMINOPHEN AND CAFFEINE 50; 325; 40 MG/1; MG/1; MG/1
1 TABLET ORAL ONCE
Status: COMPLETED | OUTPATIENT
Start: 2019-04-22 | End: 2019-04-22

## 2019-04-22 RX ORDER — POTASSIUM CHLORIDE 20 MEQ/1
40 TABLET, EXTENDED RELEASE ORAL ONCE
Status: DISCONTINUED | OUTPATIENT
Start: 2019-04-22 | End: 2019-04-25

## 2019-04-22 RX ORDER — POTASSIUM CHLORIDE 20 MEQ/1
40 TABLET, EXTENDED RELEASE ORAL DAILY
Status: DISCONTINUED | OUTPATIENT
Start: 2019-04-22 | End: 2019-04-23

## 2019-04-22 RX ORDER — PROMETHAZINE HYDROCHLORIDE 12.5 MG/1
12.5 TABLET ORAL EVERY 6 HOURS PRN
Status: DISCONTINUED | OUTPATIENT
Start: 2019-04-22 | End: 2019-04-26

## 2019-04-22 RX ORDER — MILRINONE LACTATE 0.2 MG/ML
0.12 INJECTION, SOLUTION INTRAVENOUS CONTINUOUS
Status: DISCONTINUED | OUTPATIENT
Start: 2019-04-22 | End: 2019-04-26

## 2019-04-22 RX ORDER — FUROSEMIDE 10 MG/ML
40 INJECTION INTRAMUSCULAR; INTRAVENOUS 2 TIMES DAILY
Status: DISCONTINUED | OUTPATIENT
Start: 2019-04-22 | End: 2019-04-23

## 2019-04-22 RX ADMIN — PANTOPRAZOLE SODIUM 40 MG: 40 TABLET, DELAYED RELEASE ORAL at 09:04

## 2019-04-22 RX ADMIN — FUROSEMIDE 40 MG: 10 INJECTION, SOLUTION INTRAMUSCULAR; INTRAVENOUS at 05:04

## 2019-04-22 RX ADMIN — MILRINONE LACTATE 0.25 MCG/KG/MIN: 200 INJECTION, SOLUTION INTRAVENOUS at 12:04

## 2019-04-22 RX ADMIN — POTASSIUM CHLORIDE 40 MEQ: 1500 TABLET, EXTENDED RELEASE ORAL at 09:04

## 2019-04-22 RX ADMIN — ACETAMINOPHEN 650 MG: 325 TABLET ORAL at 02:04

## 2019-04-22 RX ADMIN — METOPROLOL TARTRATE 12.5 MG: 25 TABLET, FILM COATED ORAL at 09:04

## 2019-04-22 RX ADMIN — OXYCODONE HYDROCHLORIDE AND ACETAMINOPHEN 1 TABLET: 5; 325 TABLET ORAL at 09:04

## 2019-04-22 RX ADMIN — ONDANSETRON 8 MG: 8 TABLET, ORALLY DISINTEGRATING ORAL at 10:04

## 2019-04-22 RX ADMIN — METOPROLOL TARTRATE 12.5 MG: 25 TABLET, FILM COATED ORAL at 08:04

## 2019-04-22 RX ADMIN — FUROSEMIDE 40 MG: 10 INJECTION, SOLUTION INTRAMUSCULAR; INTRAVENOUS at 09:04

## 2019-04-22 RX ADMIN — VITAMIN D, TAB 1000IU (100/BT) 2000 UNITS: 25 TAB at 09:04

## 2019-04-22 RX ADMIN — PROMETHAZINE HYDROCHLORIDE 6.25 MG: 25 INJECTION INTRAMUSCULAR; INTRAVENOUS at 11:04

## 2019-04-22 RX ADMIN — POTASSIUM CHLORIDE 40 MEQ: 1500 TABLET, EXTENDED RELEASE ORAL at 02:04

## 2019-04-22 RX ADMIN — BUTALBITAL, ACETAMINOPHEN AND CAFFEINE 1 TABLET: 50; 325; 40 TABLET ORAL at 06:04

## 2019-04-22 RX ADMIN — ONDANSETRON 8 MG: 8 TABLET, ORALLY DISINTEGRATING ORAL at 01:04

## 2019-04-22 RX ADMIN — OXYCODONE HYDROCHLORIDE AND ACETAMINOPHEN 1 TABLET: 5; 325 TABLET ORAL at 10:04

## 2019-04-22 RX ADMIN — ASPIRIN 81 MG: 81 TABLET, COATED ORAL at 09:04

## 2019-04-22 NOTE — SUBJECTIVE & OBJECTIVE
Interval History: NAEON. Pt with nausea and HA this AM. Phenergan was added for nausea as she takes this at home and says it also helps with her HA. Nurse called about drop in BP likely due to a vagal response. Quickly returned to 95/57 with MAP 71.PO Zofran was given also as pt still nauseous. RHC and TTE ordered for Thursday.     Review of Systems   Constitution: Negative for malaise/fatigue.   Cardiovascular: Negative for chest pain, claudication, dyspnea on exertion, irregular heartbeat, leg swelling and palpitations.   Respiratory: Negative for cough and shortness of breath.    Hematologic/Lymphatic: Negative for bleeding problem.   Gastrointestinal: Positive for nausea. Negative for abdominal pain.   Genitourinary: Negative for dysuria.   Neurological: Positive for headaches. Negative for weakness.     Medications:  Continuous Infusions:   milrinone 20mg/100ml D5W (200mcg/ml) 0.25 mcg/kg/min (04/22/19 1230)     Scheduled Meds:   aspirin  81 mg Oral Daily    furosemide  40 mg Intravenous BID    metoprolol tartrate  12.5 mg Oral BID    pantoprazole  40 mg Oral Daily    polyethylene glycol  17 g Oral Daily    potassium chloride SA  40 mEq Oral Daily    potassium chloride  40 mEq Oral Once    vitamin D  2,000 Units Oral Daily     PRN Meds:acetaminophen, fluticasone, ondansetron, oxyCODONE-acetaminophen, promethazine (PHENERGAN) IVPB, promethazine, sodium chloride 0.9%     Objective:     Vital Signs (Most Recent):  Temp: 97.5 °F (36.4 °C) (04/22/19 1200)  Pulse: 88 (04/22/19 1200)  Resp: 16 (04/22/19 1200)  BP: (!) (P) 95/57 (04/22/19 1300)  SpO2: (!) 91 % (04/22/19 1200) Vital Signs (24h Range):  Temp:  [97.2 °F (36.2 °C)-97.8 °F (36.6 °C)] 97.5 °F (36.4 °C)  Pulse:  [66-89] 88  Resp:  [16] 16  SpO2:  [91 %-99 %] 91 %  BP: ()/(37-77) (P) 95/57     Weight: 76 kg (167 lb 8.8 oz)  Body mass index is 26.24 kg/m².    SpO2: (!) 91 %  O2 Device (Oxygen Therapy): room air    Intake/Output - Last 3 Shifts        04/20 0700 - 04/21 0659 04/21 0700 - 04/22 0659 04/22 0700 - 04/23 0659    I.V. (mL/kg)  5.8 (0.1)     Total Intake(mL/kg)  5.8 (0.1)     Urine (mL/kg/hr)  250     Total Output  250     Net  -244.2            Urine Occurrence  1 x           Lines/Drains/Airways     Peripheral Intravenous Line                 Peripheral IV - Single Lumen 04/22/19 0430 22 G Anterior;Left Forearm less than 1 day                Physical Exam   Constitutional: She is oriented to person, place, and time. She appears well-developed and well-nourished. No distress.   HENT:   Head: Normocephalic and atraumatic.   Eyes: Pupils are equal, round, and reactive to light. EOM are normal.   Neck: Normal range of motion. No JVD present.   Cardiovascular: Normal rate, regular rhythm, normal heart sounds and normal pulses.   Pulmonary/Chest: Effort normal and breath sounds normal. No respiratory distress.   Abdominal: Soft. She exhibits no distension.   Musculoskeletal: Normal range of motion. She exhibits no edema.   Neurological: She is alert and oriented to person, place, and time.   Skin: Skin is warm, dry and intact. Capillary refill takes less than 2 seconds. She is not diaphoretic. No erythema. No pallor.   Psychiatric: She has a normal mood and affect. Her speech is normal and behavior is normal. Judgment and thought content normal.   Vitals reviewed.      Significant Labs:  BMP:   Recent Labs   Lab 04/22/19  0322   GLU 93      K 3.5      CO2 29   BUN 14   CREATININE 0.7   CALCIUM 8.8   MG 2.0     CBC:   Recent Labs   Lab 04/22/19  0322   WBC 4.09   RBC 4.10   HGB 12.6   HCT 38.7      MCV 94   MCH 30.7   MCHC 32.6       Significant Diagnostics:  I have reviewed all pertinent imaging results/findings within the past 24 hours.

## 2019-04-22 NOTE — PLAN OF CARE
04/22/19 1427   Discharge Assessment   Assessment Type Discharge Planning Assessment   Confirmed/corrected address and phone number on facesheet? Yes   Assessment information obtained from? Medical Record;Caregiver   Expected Length of Stay (days) 7   Communicated expected length of stay with patient/caregiver yes   Prior to hospitilization cognitive status: Alert/Oriented   Prior to hospitalization functional status: Independent   Current cognitive status: Alert/Oriented   Current Functional Status: Needs Assistance   Facility Arrived From: direct admit from home via [facility]   Lives With spouse   Able to Return to Prior Arrangements yes   Is patient able to care for self after discharge? Unable to determine at this time (comments)   Who are your caregiver(s) and their phone number(s)? hugo Lewis spouse [phone]   Patient's perception of discharge disposition home or selfcare   Readmission Within the Last 30 Days no previous admission in last 30 days   Patient currently being followed by outpatient case management? No   Patient currently receives any other outside agency services? No   Equipment Currently Used at Home none   Do you have any problems affording any of your prescribed medications? No   Is the patient taking medications as prescribed? yes   Does the patient have transportation home? Yes   Transportation Anticipated family or friend will provide   Does the patient receive services at the [facility]? No   Discharge Plan A Home with family   Discharge Plan B Home with family;Home Health   Patient/Family in Agreement with Plan yes     Pt resting gin bed with eyes closed. Discharge needs assessment interview completed with spouse. The patient was independent with ADL's prior to admit. No DME used at home. Confirmed face sheet demographics as accurate. Pt will primacor gtt for pre-surgical optimization. The patient was a direct admit for pre-optimization and surgical eval for  sternotomy redo for MVR. The patient is from [place], [place]. PT/OT will be consulted to help with discharge dipso. Contact information left on white board in patients room. Will continue to monitor and help with discharge planning throughout admission.

## 2019-04-22 NOTE — PROGRESS NOTES
[facility]  Cardiothoracic Surgery  Progress Note    Patient Name: Ericka Lewis  MRN: [id number]  Admission Date: 4/21/2019  Hospital Length of Stay: 1 days  Code Status: Full Code   Attending Physician: Yonatan Mcintosh MD   Referring Provider: Yonatan Mcintosh MD  Principal Problem:Mitral regurgitation            Subjective:     Post-Op Info:  * No surgery found *         Interval History: NAEON. Pt with nausea and HA this AM. Phenergan was added for nausea as she takes this at home and says it also helps with her HA. Nurse called about drop in BP likely due to a vagal response. Quickly returned to 95/57 with MAP 71.PO Zofran was given also as pt still nauseous. RHC and TTE ordered for Thursday.     Review of Systems   Constitution: Negative for malaise/fatigue.   Cardiovascular: Negative for chest pain, claudication, dyspnea on exertion, irregular heartbeat, leg swelling and palpitations.   Respiratory: Negative for cough and shortness of breath.    Hematologic/Lymphatic: Negative for bleeding problem.   Gastrointestinal: Positive for nausea. Negative for abdominal pain.   Genitourinary: Negative for dysuria.   Neurological: Positive for headaches. Negative for weakness.     Medications:  Continuous Infusions:   milrinone 20mg/100ml D5W (200mcg/ml) 0.25 mcg/kg/min (04/22/19 1230)     Scheduled Meds:   aspirin  81 mg Oral Daily    furosemide  40 mg Intravenous BID    metoprolol tartrate  12.5 mg Oral BID    pantoprazole  40 mg Oral Daily    polyethylene glycol  17 g Oral Daily    potassium chloride SA  40 mEq Oral Daily    potassium chloride  40 mEq Oral Once    vitamin D  2,000 Units Oral Daily     PRN Meds:acetaminophen, fluticasone, ondansetron, oxyCODONE-acetaminophen, promethazine (PHENERGAN) IVPB, promethazine, sodium chloride 0.9%     Objective:     Vital Signs (Most Recent):  Temp: 97.5 °F (36.4 °C) (04/22/19 1200)  Pulse: 88 (04/22/19 1200)  Resp: 16 (04/22/19  1200)  BP: (!) (P) 95/57 (04/22/19 1300)  SpO2: (!) 91 % (04/22/19 1200) Vital Signs (24h Range):  Temp:  [97.2 °F (36.2 °C)-97.8 °F (36.6 °C)] 97.5 °F (36.4 °C)  Pulse:  [66-89] 88  Resp:  [16] 16  SpO2:  [91 %-99 %] 91 %  BP: ()/(37-77) (P) 95/57     Weight: 76 kg (167 lb 8.8 oz)  Body mass index is 26.24 kg/m².    SpO2: (!) 91 %  O2 Device (Oxygen Therapy): room air    Intake/Output - Last 3 Shifts       04/20 0700 - 04/21 0659 04/21 0700 - 04/22 0659 04/22 0700 - 04/23 0659    I.V. (mL/kg)  5.8 (0.1)     Total Intake(mL/kg)  5.8 (0.1)     Urine (mL/kg/hr)  250     Total Output  250     Net  -244.2            Urine Occurrence  1 x           Lines/Drains/Airways     Peripheral Intravenous Line                 Peripheral IV - Single Lumen 04/22/19 0430 22 G Anterior;Left Forearm less than 1 day                Physical Exam   Constitutional: She is oriented to person, place, and time. She appears well-developed and well-nourished. No distress.   HENT:   Head: Normocephalic and atraumatic.   Eyes: Pupils are equal, round, and reactive to light. EOM are normal.   Neck: Normal range of motion. No JVD present.   Cardiovascular: Normal rate, regular rhythm, normal heart sounds and normal pulses.   Pulmonary/Chest: Effort normal and breath sounds normal. No respiratory distress.   Abdominal: Soft. She exhibits no distension.   Musculoskeletal: Normal range of motion. She exhibits no edema.   Neurological: She is alert and oriented to person, place, and time.   Skin: Skin is warm, dry and intact. Capillary refill takes less than 2 seconds. She is not diaphoretic. No erythema. No pallor.   Psychiatric: She has a normal mood and affect. Her speech is normal and behavior is normal. Judgment and thought content normal.   Vitals reviewed.      Significant Labs:  BMP:   Recent Labs   Lab 04/22/19  7162   GLU 93      K 3.5      CO2 29   BUN 14   CREATININE 0.7   CALCIUM 8.8   MG 2.0     CBC:   Recent Labs   Lab  04/22/19  0322   WBC 4.09   RBC 4.10   HGB 12.6   HCT 38.7      MCV 94   MCH 30.7   MCHC 32.6       Significant Diagnostics:  I have reviewed all pertinent imaging results/findings within the past 24 hours.    Assessment/Plan:     * Mitral regurgitation  64 year-old woman with a history of mitral valve prolapse s/p mitral valve repair (May 2018) and now with severe mitral stenosis, as well as paroxysmal atrial fibrillation.  Echocardiogram (4/8/2019) showed 55% ejection fraction, severe mitral stenosis with mean transvalvular gradient of 10mmHg, no mitral regurgitation, trace tricuspid regurgitation, and pulmonary hypertension with estimated systolic PA pressure of 42mmHg.      -Admit to CTS for pre-operative workup/optomization.   - Schedule for redo-sterotomy with mitral valve replacement (bioprosthesis), possible mitral valve repair, possible tricuspid valve repair, and maze procedure with left atrial appendage removal on 4/26  -Cardiac diet, 1500 fluid restriction  -Home medications restarted  -Milrinone 0.125   -Lasix 40mg BID IV   -CBC, BMP, Mag, Phos, Coags pending, and daily   -CXR pending, and daily   -DVT and GI ppx  - Phenergan added for nausea   - RHC and TTE for Thursday   -Discussed plan with staff.           Shannan Chau PA-C  Cardiothoracic Surgery  [facility]

## 2019-04-22 NOTE — ASSESSMENT & PLAN NOTE
64 year-old woman with a history of mitral valve prolapse s/p mitral valve repair (May 2018) and now with severe mitral stenosis, as well as paroxysmal atrial fibrillation.  Echocardiogram (4/8/2019) showed 55% ejection fraction, severe mitral stenosis with mean transvalvular gradient of 10mmHg, no mitral regurgitation, trace tricuspid regurgitation, and pulmonary hypertension with estimated systolic PA pressure of 42mmHg.      -Admit to CTS for pre-operative workup/optomization.   - Schedule for redo-sterotomy with mitral valve replacement (bioprosthesis), possible mitral valve repair, possible tricuspid valve repair, and maze procedure with left atrial appendage removal on 4/26  -Cardiac diet, 1500 fluid restriction  -Home medications restarted  -Milrinone 0.125   -Lasix 40mg BID IV   -CBC, BMP, Mag, Phos, Coags pending, and daily   -CXR pending, and daily   -DVT and GI ppx  - Phenergan added for nausea   - RHC and TTE for Thursday   -Discussed plan with staff.

## 2019-04-22 NOTE — PROGRESS NOTES
04/22/19 1200   Vital Signs   O2 Device (Oxygen Therapy) room air   BP (!) 60/37   MAP (mmHg) 44   Notified chantel NP, patient is now stable and her last BP was 95/57 map 71.

## 2019-04-22 NOTE — PROGRESS NOTES
Notified CTS team that patient was still complaining of a headache after receiving phenergan and percocet. MD ordered Butalbital one time dose. Will continue to monitor.

## 2019-04-22 NOTE — CARE UPDATE
Rapid Response Nurse Chart Check     Chart check completed, abnormal VS noted, bedside RN, Fidelia [id number] contacted, no concerns   verbalized at this time, instructed to call [phone] for further concerns or assistance.

## 2019-04-22 NOTE — PLAN OF CARE
Problem: Adult Inpatient Plan of Care  Goal: Optimal Comfort and Wellbeing    Intervention: Provide Person-Centered Care  Plan of care discussed with patient. Pa All tient is free of fall/trauma/injury. Denies CP, SOB. Patient maintained Primacor gtt per MD order. for complaints of headache patient received PRN percocet, and phenergan IVPB. Electrolytes replaced. All questions addressed. Will continue to monitor

## 2019-04-22 NOTE — PLAN OF CARE
Problem: Adult Inpatient Plan of Care  Goal: Plan of Care Review  Pt remains A+Ox4. Pt c/o chronic pain to back and knee, controlled by PRN oxycodone.  Waffle mattress ordered for comfort.  Pt c/o migraine pain, PRN Tylenol administered.  Primacor infusing per MAR.  Pt remains free of falls.  Pt repositioning independently. Plan of care reviewed with patient and spouse.

## 2019-04-22 NOTE — PROGRESS NOTES
Pt c/o chronic back and knee pain, states she takes 1/2  percocet at home.  Michael notified, orders for percocet 5-325 q8 ordered.  Pt also c/o increased swelling after lasix administration, states she takes 40 mg oral at home.  Michael notified, states she will discuss with Dayna in AM.

## 2019-04-23 PROBLEM — I05.0 MITRAL STENOSIS: Status: ACTIVE | Noted: 2019-04-21

## 2019-04-23 LAB
ANION GAP SERPL CALC-SCNC: 9 MMOL/L (ref 8–16)
BASOPHILS # BLD AUTO: 0.03 K/UL (ref 0–0.2)
BASOPHILS NFR BLD: 0.4 % (ref 0–1.9)
BUN SERPL-MCNC: 16 MG/DL (ref 8–23)
CALCIUM SERPL-MCNC: 9.4 MG/DL (ref 8.7–10.5)
CHLORIDE SERPL-SCNC: 98 MMOL/L (ref 95–110)
CO2 SERPL-SCNC: 29 MMOL/L (ref 23–29)
CREAT SERPL-MCNC: 0.8 MG/DL (ref 0.5–1.4)
DIFFERENTIAL METHOD: NORMAL
EOSINOPHIL # BLD AUTO: 0.1 K/UL (ref 0–0.5)
EOSINOPHIL NFR BLD: 2 % (ref 0–8)
ERYTHROCYTE [DISTWIDTH] IN BLOOD BY AUTOMATED COUNT: 12.3 % (ref 11.5–14.5)
EST. GFR  (AFRICAN AMERICAN): >60 ML/MIN/1.73 M^2
EST. GFR  (NON AFRICAN AMERICAN): >60 ML/MIN/1.73 M^2
GLUCOSE SERPL-MCNC: 99 MG/DL (ref 70–110)
HCT VFR BLD AUTO: 41.2 % (ref 37–48.5)
HGB BLD-MCNC: 13.5 G/DL (ref 12–16)
IMM GRANULOCYTES # BLD AUTO: 0.01 K/UL (ref 0–0.04)
IMM GRANULOCYTES NFR BLD AUTO: 0.1 % (ref 0–0.5)
LYMPHOCYTES # BLD AUTO: 1.9 K/UL (ref 1–4.8)
LYMPHOCYTES NFR BLD: 27 % (ref 18–48)
MAGNESIUM SERPL-MCNC: 2.2 MG/DL (ref 1.6–2.6)
MCH RBC QN AUTO: 30.2 PG (ref 27–31)
MCHC RBC AUTO-ENTMCNC: 32.8 G/DL (ref 32–36)
MCV RBC AUTO: 92 FL (ref 82–98)
MONOCYTES # BLD AUTO: 0.8 K/UL (ref 0.3–1)
MONOCYTES NFR BLD: 11 % (ref 4–15)
NEUTROPHILS # BLD AUTO: 4.1 K/UL (ref 1.8–7.7)
NEUTROPHILS NFR BLD: 59.5 % (ref 38–73)
NRBC BLD-RTO: 0 /100 WBC
PHOSPHATE SERPL-MCNC: 3 MG/DL (ref 2.7–4.5)
PLATELET # BLD AUTO: 194 K/UL (ref 150–350)
PMV BLD AUTO: 11.7 FL (ref 9.2–12.9)
POTASSIUM SERPL-SCNC: 4.5 MMOL/L (ref 3.5–5.1)
RBC # BLD AUTO: 4.47 M/UL (ref 4–5.4)
SODIUM SERPL-SCNC: 136 MMOL/L (ref 136–145)
WBC # BLD AUTO: 6.9 K/UL (ref 3.9–12.7)

## 2019-04-23 PROCEDURE — 25000003 PHARM REV CODE 250: Performed by: THORACIC SURGERY (CARDIOTHORACIC VASCULAR SURGERY)

## 2019-04-23 PROCEDURE — 36415 COLL VENOUS BLD VENIPUNCTURE: CPT

## 2019-04-23 PROCEDURE — 80048 BASIC METABOLIC PNL TOTAL CA: CPT

## 2019-04-23 PROCEDURE — 84100 ASSAY OF PHOSPHORUS: CPT

## 2019-04-23 PROCEDURE — 85025 COMPLETE CBC W/AUTO DIFF WBC: CPT

## 2019-04-23 PROCEDURE — 25000003 PHARM REV CODE 250: Performed by: STUDENT IN AN ORGANIZED HEALTH CARE EDUCATION/TRAINING PROGRAM

## 2019-04-23 PROCEDURE — 20600001 HC STEP DOWN PRIVATE ROOM

## 2019-04-23 PROCEDURE — 83735 ASSAY OF MAGNESIUM: CPT

## 2019-04-23 PROCEDURE — 63600175 PHARM REV CODE 636 W HCPCS: Performed by: THORACIC SURGERY (CARDIOTHORACIC VASCULAR SURGERY)

## 2019-04-23 RX ORDER — POTASSIUM CHLORIDE 20 MEQ/1
40 TABLET, EXTENDED RELEASE ORAL 2 TIMES DAILY
Status: DISCONTINUED | OUTPATIENT
Start: 2019-04-23 | End: 2019-04-26

## 2019-04-23 RX ORDER — FUROSEMIDE 10 MG/ML
80 INJECTION INTRAMUSCULAR; INTRAVENOUS 2 TIMES DAILY
Status: DISCONTINUED | OUTPATIENT
Start: 2019-04-23 | End: 2019-04-26

## 2019-04-23 RX ADMIN — FUROSEMIDE 80 MG: 10 INJECTION, SOLUTION INTRAMUSCULAR; INTRAVENOUS at 08:04

## 2019-04-23 RX ADMIN — METOPROLOL TARTRATE 12.5 MG: 25 TABLET, FILM COATED ORAL at 08:04

## 2019-04-23 RX ADMIN — MILRINONE LACTATE 0.25 MCG/KG/MIN: 200 INJECTION, SOLUTION INTRAVENOUS at 04:04

## 2019-04-23 RX ADMIN — ASPIRIN 81 MG: 81 TABLET, COATED ORAL at 08:04

## 2019-04-23 RX ADMIN — ONDANSETRON 8 MG: 8 TABLET, ORALLY DISINTEGRATING ORAL at 08:04

## 2019-04-23 RX ADMIN — FUROSEMIDE 80 MG: 10 INJECTION, SOLUTION INTRAMUSCULAR; INTRAVENOUS at 05:04

## 2019-04-23 RX ADMIN — POTASSIUM CHLORIDE 40 MEQ: 1500 TABLET, EXTENDED RELEASE ORAL at 09:04

## 2019-04-23 RX ADMIN — PANTOPRAZOLE SODIUM 40 MG: 40 TABLET, DELAYED RELEASE ORAL at 08:04

## 2019-04-23 RX ADMIN — OXYCODONE HYDROCHLORIDE AND ACETAMINOPHEN 1 TABLET: 5; 325 TABLET ORAL at 08:04

## 2019-04-23 RX ADMIN — METOPROLOL TARTRATE 12.5 MG: 25 TABLET, FILM COATED ORAL at 09:04

## 2019-04-23 RX ADMIN — VITAMIN D, TAB 1000IU (100/BT) 2000 UNITS: 25 TAB at 08:04

## 2019-04-23 NOTE — SUBJECTIVE & OBJECTIVE
Past Medical History:   Diagnosis Date    Heart murmur     Hyperlipidemia     Hypertension        Past Surgical History:   Procedure Laterality Date    CARDIAC CATHETERIZATION         Review of patient's allergies indicates:   Allergen Reactions    Kiwi Anaphylaxis    Metronidazole Anaphylaxis, Hives and Other (See Comments)     BREATHING ISSUES AND HIVES         PTA Medications   Medication Sig    aspirin (ECOTRIN) 81 MG EC tablet Take 81 mg by mouth once daily.     cholecalciferol, vitamin D3, (VITAMIN D3) 2,000 unit Cap Take 2,000 Units by mouth once daily.     diclofenac sodium (VOLTAREN) 1 % Gel Apply 2 g topically once daily.     estradiol (ESTRACE) 1 MG tablet Take 1 mg by mouth once daily.     fluticasone (FLONASE) 50 mcg/actuation nasal spray SHAKE LQ AND U 1 SPR IEN D    furosemide (LASIX) 40 MG tablet Take 40 mg by mouth 2 (two) times daily.    lidocaine (XYLOCAINE) 5 % Oint ointment REGINALDO EXT AA D    meloxicam (MOBIC) 15 MG tablet TK 1 T PO D    metoprolol succinate (TOPROL-XL) 25 MG 24 hr tablet Take 25 mg by mouth once daily.    multivitamin (THERAGRAN) per tablet Take 1 tablet by mouth once daily.     omeprazole (PRILOSEC) 40 MG capsule Take 40 mg by mouth every morning.     oxyCODONE-acetaminophen (PERCOCET)  mg per tablet Take 1 tablet by mouth as needed.     potassium chloride SA (K-DUR,KLOR-CON) 20 MEQ tablet Take 20 mEq by mouth once daily.     promethazine (PHENERGAN) 25 MG tablet Take 25 mg by mouth every 6 (six) hours as needed.    tiZANidine (ZANAFLEX) 4 MG tablet TK 1 T PO QID PRF SPASM     Family History     Problem Relation (Age of Onset)    Arrhythmia Father    Diabetes Mother    Heart disease Mother, Sister    Hypertension Mother, Father    Liver disease Sister    No Known Problems Brother, Maternal Aunt, Maternal Uncle, Paternal Aunt, Paternal Uncle, Maternal Grandmother, Maternal Grandfather, Paternal Grandmother, Paternal Grandfather    Stroke Mother         Tobacco Use    Smoking status: Former Smoker    Smokeless tobacco: Never Used   Substance and Sexual Activity    Alcohol use: Not on file     Comment: social    Drug use: Never    Sexual activity: Not on file     Review of Systems   Constitution: Negative for chills, fever and weight gain.   HENT: Negative for congestion.    Eyes: Negative for visual disturbance.   Cardiovascular: Negative for chest pain, claudication, dyspnea on exertion, leg swelling, orthopnea, palpitations and syncope.   Respiratory: Positive for shortness of breath. Negative for cough and snoring.    Hematologic/Lymphatic: Does not bruise/bleed easily.   Skin: Negative for rash.   Musculoskeletal: Negative for muscle cramps and myalgias.   Gastrointestinal: Negative for bloating, abdominal pain, constipation, diarrhea and melena.   Genitourinary: Negative for bladder incontinence.   Neurological: Negative for excessive daytime sleepiness, focal weakness and weakness.   Psychiatric/Behavioral: Negative for depression and suicidal ideas.     Objective:     Vital Signs (Most Recent):  Temp: 97.8 °F (36.6 °C) (04/23/19 0718)  Pulse: 81 (04/23/19 0802)  Resp: 16 (04/23/19 0718)  BP: 98/63 (04/23/19 0830)  SpO2: (!) 89 % (04/23/19 0718) Vital Signs (24h Range):  Temp:  [97.3 °F (36.3 °C)-98.9 °F (37.2 °C)] 97.8 °F (36.6 °C)  Pulse:  [55-94] 81  Resp:  [16-18] 16  SpO2:  [86 %-98 %] 89 %  BP: ()/(37-66) 98/63     Weight: 76 kg (167 lb 8.8 oz)  Body mass index is 26.24 kg/m².    SpO2: (!) 89 %  O2 Device (Oxygen Therapy): room air      Intake/Output Summary (Last 24 hours) at 4/23/2019 1003  Last data filed at 4/23/2019 0500  Gross per 24 hour   Intake 360 ml   Output 950 ml   Net -590 ml       Lines/Drains/Airways     Peripheral Intravenous Line                 Peripheral IV - Single Lumen 04/22/19 0430 22 G Anterior;Left Forearm 1 day                Physical Exam   Constitutional: She is oriented to person, place, and time. She appears  well-developed and well-nourished. No distress.   HENT:   Head: Normocephalic and atraumatic.   Mouth/Throat: Oropharynx is clear and moist.   Eyes: Pupils are equal, round, and reactive to light. Conjunctivae and EOM are normal. No scleral icterus.   Neck: Normal range of motion. Neck supple. No JVD present.   Cardiovascular: Normal rate, regular rhythm, normal heart sounds and intact distal pulses.   No murmur heard.  Pulses:       Radial pulses are 2+ on the right side, and 2+ on the left side.   Pulmonary/Chest: Effort normal and breath sounds normal. No respiratory distress.   Symmetrical expansion   Abdominal: Soft. Bowel sounds are normal. There is no hepatosplenomegaly. There is no tenderness.   Musculoskeletal: Normal range of motion. She exhibits no edema.   Neurological: She is alert and oriented to person, place, and time. No cranial nerve deficit.   Skin: Skin is warm and dry. No rash noted. She is not diaphoretic.   Psychiatric: She has a normal mood and affect. Judgment and thought content normal.       Significant Labs:   BMP:   Recent Labs   Lab 04/21/19  1215 04/22/19  0322 04/23/19  0327   GLU 94 93 99    137 136   K 3.9 3.5 4.5    100 98   CO2 26 29 29   BUN 12 14 16   CREATININE 0.7 0.7 0.8   CALCIUM 9.4 8.8 9.4   MG 2.0 2.0 2.2   , INR   Recent Labs   Lab 04/21/19  1215   INR 0.9     Significant Imaging:   ECHO:  · Normal left ventricular systolic function. The estimated ejection fraction is 55%  · Normal right ventricular systolic function.  · Severe left atrial enlargement.  · The mitral valve is s/p repair (May 2018) with no residual regurgitation but with a mean diastolic gradient of 10mm Hg at a heart rate of 73 bpm.  · Pulmonary hypertension present.  · The estimated PA systolic pressure is 42 mm Hg  · Intermediate central venous pressure (8 mm Hg).

## 2019-04-23 NOTE — ASSESSMENT & PLAN NOTE
We are consulted for RHC for PH evaluation in this 63yo lady who is undergoing MVR/r on 04/26 with Dr. Mcintosh    - plan on right IJ access with 7-Fr slender sheath    - The risks, benefits & alternatives of the procedure were explained to the patient.    - The risks include but are not limited to:  Bleeding, infection, heart rhythm abnormalities,  stroke and death.    - The risks of moderate sedation include hypotension, respiratory depression, arrhythmias, bronchospasm, & death.    - Informed consent was obtained & the patient is agreeable to proceed with the procedure.  - This patient was discussed with the attending interventional cardiologist who agrees with the above assessment & plan.   - All patient's questions were answered

## 2019-04-23 NOTE — SUBJECTIVE & OBJECTIVE
Interval History: NAEON. Still with mild headache this morning. Nausea improved from yesterday. Milrinone drip decreased and will turn off later if headache persists.     Review of Systems   Constitution: Negative for malaise/fatigue.   Cardiovascular: Negative for chest pain, claudication, dyspnea on exertion, leg swelling and palpitations.   Respiratory: Negative for cough and shortness of breath.    Hematologic/Lymphatic: Negative for bleeding problem.   Gastrointestinal: Negative for abdominal pain.   Genitourinary: Negative for dysuria.   Neurological: Positive for headaches. Negative for weakness.     Medications:  Continuous Infusions:   milrinone 20mg/100ml D5W (200mcg/ml) 0.25 mcg/kg/min (04/23/19 0426)     Scheduled Meds:   aspirin  81 mg Oral Daily    furosemide  80 mg Intravenous BID    metoprolol tartrate  12.5 mg Oral BID    pantoprazole  40 mg Oral Daily    polyethylene glycol  17 g Oral Daily    potassium chloride  40 mEq Oral Once    potassium chloride SA  40 mEq Oral BID    vitamin D  2,000 Units Oral Daily     PRN Meds:acetaminophen, fluticasone, ondansetron, oxyCODONE-acetaminophen, promethazine (PHENERGAN) IVPB, promethazine, sodium chloride 0.9%     Objective:     Vital Signs (Most Recent):  Temp: 97.8 °F (36.6 °C) (04/23/19 0718)  Pulse: 81 (04/23/19 0802)  Resp: 16 (04/23/19 0718)  BP: 101/63 (04/23/19 0815)  SpO2: (!) 89 % (04/23/19 0718) Vital Signs (24h Range):  Temp:  [97.3 °F (36.3 °C)-98.9 °F (37.2 °C)] 97.8 °F (36.6 °C)  Pulse:  [55-94] 81  Resp:  [16-18] 16  SpO2:  [86 %-98 %] 89 %  BP: ()/(37-66) 101/63     Weight: 76 kg (167 lb 8.8 oz)  Body mass index is 26.24 kg/m².    SpO2: (!) 89 %  O2 Device (Oxygen Therapy): room air    Intake/Output - Last 3 Shifts       04/21 0700 - 04/22 0659 04/22 0700 - 04/23 0659 04/23 0700 - 04/24 0659    P.O.  360     I.V. (mL/kg) 5.8 (0.1)      Total Intake(mL/kg) 5.8 (0.1) 360 (4.7)     Urine (mL/kg/hr) 250 950 (0.5)     Total Output  250 950     Net -244.2 -590            Urine Occurrence 1 x 2 x           Lines/Drains/Airways     Peripheral Intravenous Line                 Peripheral IV - Single Lumen 04/22/19 0430 22 G Anterior;Left Forearm 1 day                Physical Exam   Constitutional: She is oriented to person, place, and time. She appears well-developed and well-nourished. No distress.   HENT:   Head: Normocephalic and atraumatic.   Eyes: Pupils are equal, round, and reactive to light. EOM are normal.   Neck: Normal range of motion.   Cardiovascular: Normal rate, regular rhythm, normal heart sounds and normal pulses.   Pulmonary/Chest: Effort normal. No respiratory distress.   Abdominal: Soft. She exhibits no distension.   Musculoskeletal: Normal range of motion. She exhibits no edema.   Neurological: She is alert and oriented to person, place, and time.   Skin: Skin is warm, dry and intact. Capillary refill takes less than 2 seconds. She is not diaphoretic. No erythema. No pallor.   Psychiatric: She has a normal mood and affect. Her speech is normal and behavior is normal. Judgment and thought content normal.   Vitals reviewed.      Significant Labs:  BMP:   Recent Labs   Lab 04/23/19  0327   GLU 99      K 4.5   CL 98   CO2 29   BUN 16   CREATININE 0.8   CALCIUM 9.4   MG 2.2     CBC:   Recent Labs   Lab 04/23/19 0327   WBC 6.90   RBC 4.47   HGB 13.5   HCT 41.2      MCV 92   MCH 30.2   MCHC 32.8       Significant Diagnostics:  I have reviewed all pertinent imaging results/findings within the past 24 hours.

## 2019-04-23 NOTE — HPI
We are consulted for RHC for evaluation of recently diagnosed PH by echo in this 65yo lady with MVP s/p MVr 05/2018, now with severe mitral stenosis and plans for MVR/r with LA appendage removal and MAZE on 04/26 with Dr. Mcintosh.    Currently the patient states she feels well, no changes in her symptoms overnight. She has no further questions about her RHC and wants to proceed.

## 2019-04-23 NOTE — ASSESSMENT & PLAN NOTE
64 year-old woman with a history of mitral valve prolapse s/p mitral valve repair (May 2018) and now with severe mitral stenosis, as well as paroxysmal atrial fibrillation.  Echocardiogram (4/8/2019) showed 55% ejection fraction, severe mitral stenosis with mean transvalvular gradient of 10mmHg, no mitral regurgitation, trace tricuspid regurgitation, and pulmonary hypertension with estimated systolic PA pressure of 42mmHg.      -Admit to CTS for pre-operative workup/optomization.   - Schedule for redo-sterotomy with mitral valve replacement (bioprosthesis), possible mitral valve repair, possible tricuspid valve repair, and maze procedure with left atrial appendage removal on 4/26  -Cardiac diet, 1500 fluid restriction  -Home medications restarted  -Milrinone 0.25 (will D/C later if headache persists)   -Lasix 80mg IV BID  -CBC, BMP, Mag, Phos, Coags pending, and daily   -CXR pending, and daily   -DVT and GI ppx  - Phenergan and Zofran PRN for nausea   - RHC and TTE for Thursday   -Discussed plan with staff.

## 2019-04-23 NOTE — CONSULTS
[facility]  Interventional Cardiology  Consult Note    Patient Name: Ericka Lewis  MRN: [id number]  Admission Date: 4/21/2019  Hospital Length of Stay: 2 days  Code Status: Full Code   Attending Provider: Yonatan Mcintosh MD  Consulting Provider: Srini Kitchen MD  Primary Care Physician: Primary Doctor No  Principal Problem:Mitral stenosis    Patient information was obtained from patient and past medical records.     Inpatient consult to Interventional Cardiology  Consult performed by: Srini Kitchen MD  Consult ordered by: Jennifer Roger NP        Subjective:     Chief Complaint:  Shortness of breath  REASON FOR CONSULT: RHC     HPI:  We are consulted for RHC for evaluation of recently diagnosed PH by echo in this 63yo lady with MVP s/p MVr 05/2018, now with severe mitral stenosis and plans for MVR/r with LA appendage removal and MAZE on 04/26 with Dr. Mcintosh.    Currently the patient states she feels well, no changes in her symptoms overnight. She has no further questions about her RHC and wants to proceed.    Past Medical History:   Diagnosis Date    Heart murmur     Hyperlipidemia     Hypertension        Past Surgical History:   Procedure Laterality Date    CARDIAC CATHETERIZATION         Review of patient's allergies indicates:   Allergen Reactions    Kiwi Anaphylaxis    Metronidazole Anaphylaxis, Hives and Other (See Comments)     BREATHING ISSUES AND HIVES         PTA Medications   Medication Sig    aspirin (ECOTRIN) 81 MG EC tablet Take 81 mg by mouth once daily.     cholecalciferol, vitamin D3, (VITAMIN D3) 2,000 unit Cap Take 2,000 Units by mouth once daily.     diclofenac sodium (VOLTAREN) 1 % Gel Apply 2 g topically once daily.     estradiol (ESTRACE) 1 MG tablet Take 1 mg by mouth once daily.     fluticasone (FLONASE) 50 mcg/actuation nasal spray SHAKE LQ AND U 1 SPR IEN D    furosemide (LASIX) 40 MG tablet Take 40 mg by mouth 2 (two) times daily.     lidocaine (XYLOCAINE) 5 % Oint ointment REGINALDO EXT AA D    meloxicam (MOBIC) 15 MG tablet TK 1 T PO D    metoprolol succinate (TOPROL-XL) 25 MG 24 hr tablet Take 25 mg by mouth once daily.    multivitamin (THERAGRAN) per tablet Take 1 tablet by mouth once daily.     omeprazole (PRILOSEC) 40 MG capsule Take 40 mg by mouth every morning.     oxyCODONE-acetaminophen (PERCOCET)  mg per tablet Take 1 tablet by mouth as needed.     potassium chloride SA (K-DUR,KLOR-CON) 20 MEQ tablet Take 20 mEq by mouth once daily.     promethazine (PHENERGAN) 25 MG tablet Take 25 mg by mouth every 6 (six) hours as needed.    tiZANidine (ZANAFLEX) 4 MG tablet TK 1 T PO QID PRF SPASM     Family History     Problem Relation (Age of Onset)    Arrhythmia Father    Diabetes Mother    Heart disease Mother, Sister    Hypertension Mother, Father    Liver disease Sister    No Known Problems Brother, Maternal Aunt, Maternal Uncle, Paternal Aunt, Paternal Uncle, Maternal Grandmother, Maternal Grandfather, Paternal Grandmother, Paternal Grandfather    Stroke Mother        Tobacco Use    Smoking status: Former Smoker    Smokeless tobacco: Never Used   Substance and Sexual Activity    Alcohol use: Not on file     Comment: social    Drug use: Never    Sexual activity: Not on file     Review of Systems   Constitution: Negative for chills, fever and weight gain.   HENT: Negative for congestion.    Eyes: Negative for visual disturbance.   Cardiovascular: Negative for chest pain, claudication, dyspnea on exertion, leg swelling, orthopnea, palpitations and syncope.   Respiratory: Positive for shortness of breath. Negative for cough and snoring.    Hematologic/Lymphatic: Does not bruise/bleed easily.   Skin: Negative for rash.   Musculoskeletal: Negative for muscle cramps and myalgias.   Gastrointestinal: Negative for bloating, abdominal pain, constipation, diarrhea and melena.   Genitourinary: Negative for bladder incontinence.    Neurological: Negative for excessive daytime sleepiness, focal weakness and weakness.   Psychiatric/Behavioral: Negative for depression and suicidal ideas.     Objective:     Vital Signs (Most Recent):  Temp: 97.8 °F (36.6 °C) (04/23/19 0718)  Pulse: 81 (04/23/19 0802)  Resp: 16 (04/23/19 0718)  BP: 98/63 (04/23/19 0830)  SpO2: (!) 89 % (04/23/19 0718) Vital Signs (24h Range):  Temp:  [97.3 °F (36.3 °C)-98.9 °F (37.2 °C)] 97.8 °F (36.6 °C)  Pulse:  [55-94] 81  Resp:  [16-18] 16  SpO2:  [86 %-98 %] 89 %  BP: ()/(37-66) 98/63     Weight: 76 kg (167 lb 8.8 oz)  Body mass index is 26.24 kg/m².    SpO2: (!) 89 %  O2 Device (Oxygen Therapy): room air      Intake/Output Summary (Last 24 hours) at 4/23/2019 1003  Last data filed at 4/23/2019 0500  Gross per 24 hour   Intake 360 ml   Output 950 ml   Net -590 ml       Lines/Drains/Airways     Peripheral Intravenous Line                 Peripheral IV - Single Lumen 04/22/19 0430 22 G Anterior;Left Forearm 1 day                Physical Exam   Constitutional: She is oriented to person, place, and time. She appears well-developed and well-nourished. No distress.   HENT:   Head: Normocephalic and atraumatic.   Mouth/Throat: Oropharynx is clear and moist.   Eyes: Pupils are equal, round, and reactive to light. Conjunctivae and EOM are normal. No scleral icterus.   Neck: Normal range of motion. Neck supple. No JVD present.   Cardiovascular: Normal rate, regular rhythm, normal heart sounds and intact distal pulses.   No murmur heard.  Pulses:       Radial pulses are 2+ on the right side, and 2+ on the left side.   Pulmonary/Chest: Effort normal and breath sounds normal. No respiratory distress.   Symmetrical expansion   Abdominal: Soft. Bowel sounds are normal. There is no hepatosplenomegaly. There is no tenderness.   Musculoskeletal: Normal range of motion. She exhibits no edema.   Neurological: She is alert and oriented to person, place, and time. No cranial nerve deficit.    Skin: Skin is warm and dry. No rash noted. She is not diaphoretic.   Psychiatric: She has a normal mood and affect. Judgment and thought content normal.       Significant Labs:   BMP:   Recent Labs   Lab 04/21/19  1215 04/22/19  0322 04/23/19  0327   GLU 94 93 99    137 136   K 3.9 3.5 4.5    100 98   CO2 26 29 29   BUN 12 14 16   CREATININE 0.7 0.7 0.8   CALCIUM 9.4 8.8 9.4   MG 2.0 2.0 2.2   , INR   Recent Labs   Lab 04/21/19  1215   INR 0.9     Significant Imaging:   ECHO:  · Normal left ventricular systolic function. The estimated ejection fraction is 55%  · Normal right ventricular systolic function.  · Severe left atrial enlargement.  · The mitral valve is s/p repair (May 2018) with no residual regurgitation but with a mean diastolic gradient of 10mm Hg at a heart rate of 73 bpm.  · Pulmonary hypertension present.  · The estimated PA systolic pressure is 42 mm Hg  · Intermediate central venous pressure (8 mm Hg).    Assessment and Plan:     * Mitral stenosis  We are consulted for RHC for PH evaluation in this 63yo lady who is undergoing MVR/r on 04/26 with Dr. Mcintosh    - plan on right IJ access with 7-Fr slender sheath    - The risks, benefits & alternatives of the procedure were explained to the patient.    - The risks include but are not limited to:  Bleeding, infection, heart rhythm abnormalities,  stroke and death.    - The risks of moderate sedation include hypotension, respiratory depression, arrhythmias, bronchospasm, & death.    - Informed consent was obtained & the patient is agreeable to proceed with the procedure.  - This patient was discussed with the attending interventional cardiologist who agrees with the above assessment & plan.   - All patient's questions were answered          VTE Risk Mitigation (From admission, onward)        Ordered     IP VTE LOW RISK PATIENT  Once      04/21/19 1051     Place sequential compression device  Until discontinued      04/21/19 1051           Thank you for your consult. I will follow-up with patient. Please contact us if you have any additional questions.    Srini Kitchen MD  Interventional Cardiology   [facility]

## 2019-04-23 NOTE — PLAN OF CARE
Problem: Adult Inpatient Plan of Care  Goal: Plan of Care Review  Outcome: Ongoing (interventions implemented as appropriate)  Primacor gtt maintained. Complains of headache treated with oxy and zofran given to prevent nausea. VSS. Pt denies SOB or chest pain. Pt remain on tele. Fall precautions maintained. Pt free of falls and injuries. POC discussed with patient/family, verbalized understanding. Questions answered, no distress at present.

## 2019-04-23 NOTE — PROGRESS NOTES
[facility]  Cardiothoracic Surgery  Progress Note    Patient Name: Ericka Lewis  MRN: [id number]  Admission Date: 4/21/2019  Hospital Length of Stay: 2 days  Code Status: Full Code   Attending Physician: Yonatan Mcintosh MD   Referring Provider: Yonatan Mcintosh MD  Principal Problem:Mitral regurgitation            Subjective:     Post-Op Info:  Procedure(s) (LRB):  INSERTION, CATHETER, RIGHT HEART (Right)         Interval History: NAEON. Still with mild headache this morning. Nausea improved from yesterday. Milrinone drip decreased and will turn off later if headache persists.     Review of Systems   Constitution: Negative for malaise/fatigue.   Cardiovascular: Negative for chest pain, claudication, dyspnea on exertion, leg swelling and palpitations.   Respiratory: Negative for cough and shortness of breath.    Hematologic/Lymphatic: Negative for bleeding problem.   Gastrointestinal: Negative for abdominal pain.   Genitourinary: Negative for dysuria.   Neurological: Positive for headaches. Negative for weakness.     Medications:  Continuous Infusions:   milrinone 20mg/100ml D5W (200mcg/ml) 0.25 mcg/kg/min (04/23/19 0426)     Scheduled Meds:   aspirin  81 mg Oral Daily    furosemide  80 mg Intravenous BID    metoprolol tartrate  12.5 mg Oral BID    pantoprazole  40 mg Oral Daily    polyethylene glycol  17 g Oral Daily    potassium chloride  40 mEq Oral Once    potassium chloride SA  40 mEq Oral BID    vitamin D  2,000 Units Oral Daily     PRN Meds:acetaminophen, fluticasone, ondansetron, oxyCODONE-acetaminophen, promethazine (PHENERGAN) IVPB, promethazine, sodium chloride 0.9%     Objective:     Vital Signs (Most Recent):  Temp: 97.8 °F (36.6 °C) (04/23/19 0718)  Pulse: 81 (04/23/19 0802)  Resp: 16 (04/23/19 0718)  BP: 101/63 (04/23/19 0815)  SpO2: (!) 89 % (04/23/19 0718) Vital Signs (24h Range):  Temp:  [97.3 °F (36.3 °C)-98.9 °F (37.2 °C)] 97.8 °F (36.6 °C)  Pulse:  [55-94]  81  Resp:  [16-18] 16  SpO2:  [86 %-98 %] 89 %  BP: ()/(37-66) 101/63     Weight: 76 kg (167 lb 8.8 oz)  Body mass index is 26.24 kg/m².    SpO2: (!) 89 %  O2 Device (Oxygen Therapy): room air    Intake/Output - Last 3 Shifts       04/21 0700 - 04/22 0659 04/22 0700 - 04/23 0659 04/23 0700 - 04/24 0659    P.O.  360     I.V. (mL/kg) 5.8 (0.1)      Total Intake(mL/kg) 5.8 (0.1) 360 (4.7)     Urine (mL/kg/hr) 250 950 (0.5)     Total Output 250 950     Net -244.2 -590            Urine Occurrence 1 x 2 x           Lines/Drains/Airways     Peripheral Intravenous Line                 Peripheral IV - Single Lumen 04/22/19 0430 22 G Anterior;Left Forearm 1 day                Physical Exam   Constitutional: She is oriented to person, place, and time. She appears well-developed and well-nourished. No distress.   HENT:   Head: Normocephalic and atraumatic.   Eyes: Pupils are equal, round, and reactive to light. EOM are normal.   Neck: Normal range of motion.   Cardiovascular: Normal rate, regular rhythm, normal heart sounds and normal pulses.   Pulmonary/Chest: Effort normal. No respiratory distress.   Abdominal: Soft. She exhibits no distension.   Musculoskeletal: Normal range of motion. She exhibits no edema.   Neurological: She is alert and oriented to person, place, and time.   Skin: Skin is warm, dry and intact. Capillary refill takes less than 2 seconds. She is not diaphoretic. No erythema. No pallor.   Psychiatric: She has a normal mood and affect. Her speech is normal and behavior is normal. Judgment and thought content normal.   Vitals reviewed.      Significant Labs:  BMP:   Recent Labs   Lab 04/23/19  0327   GLU 99      K 4.5   CL 98   CO2 29   BUN 16   CREATININE 0.8   CALCIUM 9.4   MG 2.2     CBC:   Recent Labs   Lab 04/23/19  0327   WBC 6.90   RBC 4.47   HGB 13.5   HCT 41.2      MCV 92   MCH 30.2   MCHC 32.8       Significant Diagnostics:  I have reviewed all pertinent imaging results/findings  within the past 24 hours.    Assessment/Plan:     * Mitral regurgitation  64 year-old woman with a history of mitral valve prolapse s/p mitral valve repair (May 2018) and now with severe mitral stenosis, as well as paroxysmal atrial fibrillation.  Echocardiogram (4/8/2019) showed 55% ejection fraction, severe mitral stenosis with mean transvalvular gradient of 10mmHg, no mitral regurgitation, trace tricuspid regurgitation, and pulmonary hypertension with estimated systolic PA pressure of 42mmHg.      -Admit to CTS for pre-operative workup/optomization.   - Schedule for redo-sterotomy with mitral valve replacement (bioprosthesis), possible mitral valve repair, possible tricuspid valve repair, and maze procedure with left atrial appendage removal on 4/26  -Cardiac diet, 1500 fluid restriction  -Home medications restarted  -Milrinone 0.25 (will D/C later if headache persists)   -Lasix 80mg IV BID  -CBC, BMP, Mag, Phos, Coags pending, and daily   -CXR pending, and daily   -DVT and GI ppx  - Phenergan and Zofran PRN for nausea   - RHC and TTE for Thursday   -Discussed plan with staff.         Shannan Chau PA-C  Cardiothoracic Surgery  [facility]

## 2019-04-24 LAB
ANION GAP SERPL CALC-SCNC: 13 MMOL/L (ref 8–16)
ASCENDING AORTA: 3.68 CM
AV INDEX (PROSTH): 0.79
AV MEAN GRADIENT: 1.76 MMHG
AV PEAK GRADIENT: 2.96 MMHG
AV VALVE AREA: 3.32 CM2
AV VELOCITY RATIO: 0.81
BASOPHILS # BLD AUTO: 0.07 K/UL (ref 0–0.2)
BASOPHILS NFR BLD: 1.1 % (ref 0–1.9)
BSA FOR ECHO PROCEDURE: 1.89 M2
BUN SERPL-MCNC: 20 MG/DL (ref 8–23)
CALCIUM SERPL-MCNC: 9.3 MG/DL (ref 8.7–10.5)
CHLORIDE SERPL-SCNC: 97 MMOL/L (ref 95–110)
CO2 SERPL-SCNC: 28 MMOL/L (ref 23–29)
CREAT SERPL-MCNC: 0.8 MG/DL (ref 0.5–1.4)
CV ECHO LV RWT: 0.38 CM
DIFFERENTIAL METHOD: NORMAL
DOP CALC AO PEAK VEL: 0.86 M/S
DOP CALC AO VTI: 13.77 CM
DOP CALC LVOT AREA: 4.19 CM2
DOP CALC LVOT DIAMETER: 2.31 CM
DOP CALC LVOT PEAK VEL: 0.7 M/S
DOP CALC LVOT STROKE VOLUME: 45.78 CM3
DOP CALCLVOT PEAK VEL VTI: 10.93 CM
E WAVE DECELERATION TIME: 520.63 MSEC
E/A RATIO: 0.65
E/E' RATIO: 10.35
ECHO LV POSTERIOR WALL: 0.84 CM (ref 0.6–1.1)
EOSINOPHIL # BLD AUTO: 0.3 K/UL (ref 0–0.5)
EOSINOPHIL NFR BLD: 4.5 % (ref 0–8)
ERYTHROCYTE [DISTWIDTH] IN BLOOD BY AUTOMATED COUNT: 12.3 % (ref 11.5–14.5)
EST. GFR  (AFRICAN AMERICAN): >60 ML/MIN/1.73 M^2
EST. GFR  (NON AFRICAN AMERICAN): >60 ML/MIN/1.73 M^2
FRACTIONAL SHORTENING: 24 % (ref 28–44)
GLUCOSE SERPL-MCNC: 91 MG/DL (ref 70–110)
HCT VFR BLD AUTO: 44.2 % (ref 37–48.5)
HGB BLD-MCNC: 14.5 G/DL (ref 12–16)
IMM GRANULOCYTES # BLD AUTO: 0.02 K/UL (ref 0–0.04)
IMM GRANULOCYTES NFR BLD AUTO: 0.3 % (ref 0–0.5)
INTERVENTRICULAR SEPTUM: 0.85 CM (ref 0.6–1.1)
LA MAJOR: 5 CM
LA MINOR: 5.29 CM
LA WIDTH: 4.09 CM
LEFT ATRIUM SIZE: 4.01 CM
LEFT ATRIUM VOLUME INDEX: 38.3 ML/M2
LEFT ATRIUM VOLUME: 71.67 CM3
LEFT INTERNAL DIMENSION IN SYSTOLE: 3.38 CM (ref 2.1–4)
LEFT VENTRICLE DIASTOLIC VOLUME INDEX: 48.64 ML/M2
LEFT VENTRICLE DIASTOLIC VOLUME: 91.1 ML
LEFT VENTRICLE MASS INDEX: 64.5 G/M2
LEFT VENTRICLE SYSTOLIC VOLUME INDEX: 24.9 ML/M2
LEFT VENTRICLE SYSTOLIC VOLUME: 46.66 ML
LEFT VENTRICULAR INTERNAL DIMENSION IN DIASTOLE: 4.47 CM (ref 3.5–6)
LEFT VENTRICULAR MASS: 120.77 G
LV LATERAL E/E' RATIO: 8.8
LV SEPTAL E/E' RATIO: 12.57
LYMPHOCYTES # BLD AUTO: 2.3 K/UL (ref 1–4.8)
LYMPHOCYTES NFR BLD: 35.3 % (ref 18–48)
MAGNESIUM SERPL-MCNC: 2.3 MG/DL (ref 1.6–2.6)
MCH RBC QN AUTO: 30.5 PG (ref 27–31)
MCHC RBC AUTO-ENTMCNC: 32.8 G/DL (ref 32–36)
MCV RBC AUTO: 93 FL (ref 82–98)
MONOCYTES # BLD AUTO: 0.7 K/UL (ref 0.3–1)
MONOCYTES NFR BLD: 11.1 % (ref 4–15)
MV MEAN GRADIENT: 3 MMHG
MV PEAK A VEL: 1.35 M/S
MV PEAK E VEL: 0.88 M/S
MV PEAK GRADIENT: 7 MMHG
NEUTROPHILS # BLD AUTO: 3.1 K/UL (ref 1.8–7.7)
NEUTROPHILS NFR BLD: 47.7 % (ref 38–73)
NRBC BLD-RTO: 0 /100 WBC
PHOSPHATE SERPL-MCNC: 3.6 MG/DL (ref 2.7–4.5)
PISA TR MAX VEL: 2.21 M/S
PLATELET # BLD AUTO: 203 K/UL (ref 150–350)
PMV BLD AUTO: 11.2 FL (ref 9.2–12.9)
POTASSIUM SERPL-SCNC: 3.7 MMOL/L (ref 3.5–5.1)
RA MAJOR: 3.72 CM
RA PRESSURE: 3 MMHG
RA WIDTH: 3.69 CM
RBC # BLD AUTO: 4.75 M/UL (ref 4–5.4)
RIGHT VENTRICULAR END-DIASTOLIC DIMENSION: 3.63 CM
RV TISSUE DOPPLER FREE WALL SYSTOLIC VELOCITY 1 (APICAL 4 CHAMBER VIEW): 10 M/S
SINUS: 3.48 CM
SODIUM SERPL-SCNC: 138 MMOL/L (ref 136–145)
STJ: 3.57 CM
TDI LATERAL: 0.1
TDI SEPTAL: 0.07
TDI: 0.09
TR MAX PG: 19.54 MMHG
TV REST PULMONARY ARTERY PRESSURE: 23 MMHG
WBC # BLD AUTO: 6.51 K/UL (ref 3.9–12.7)

## 2019-04-24 PROCEDURE — 25000003 PHARM REV CODE 250: Performed by: STUDENT IN AN ORGANIZED HEALTH CARE EDUCATION/TRAINING PROGRAM

## 2019-04-24 PROCEDURE — 84100 ASSAY OF PHOSPHORUS: CPT

## 2019-04-24 PROCEDURE — 63600175 PHARM REV CODE 636 W HCPCS: Performed by: THORACIC SURGERY (CARDIOTHORACIC VASCULAR SURGERY)

## 2019-04-24 PROCEDURE — 80048 BASIC METABOLIC PNL TOTAL CA: CPT

## 2019-04-24 PROCEDURE — 85025 COMPLETE CBC W/AUTO DIFF WBC: CPT

## 2019-04-24 PROCEDURE — 20600001 HC STEP DOWN PRIVATE ROOM

## 2019-04-24 PROCEDURE — 36415 COLL VENOUS BLD VENIPUNCTURE: CPT

## 2019-04-24 PROCEDURE — 83735 ASSAY OF MAGNESIUM: CPT

## 2019-04-24 PROCEDURE — 25000003 PHARM REV CODE 250: Performed by: THORACIC SURGERY (CARDIOTHORACIC VASCULAR SURGERY)

## 2019-04-24 RX ORDER — DIPHENHYDRAMINE HCL 50 MG
50 CAPSULE ORAL
Status: DISCONTINUED | OUTPATIENT
Start: 2019-04-24 | End: 2019-04-25 | Stop reason: HOSPADM

## 2019-04-24 RX ORDER — METOLAZONE 2.5 MG/1
2.5 TABLET ORAL 2 TIMES DAILY
Status: DISCONTINUED | OUTPATIENT
Start: 2019-04-24 | End: 2019-04-25

## 2019-04-24 RX ORDER — SODIUM CHLORIDE 9 MG/ML
INJECTION, SOLUTION INTRAVENOUS ONCE
Status: DISCONTINUED | OUTPATIENT
Start: 2019-04-24 | End: 2019-04-25 | Stop reason: HOSPADM

## 2019-04-24 RX ADMIN — POLYETHYLENE GLYCOL 3350 17 G: 17 POWDER, FOR SOLUTION ORAL at 09:04

## 2019-04-24 RX ADMIN — FUROSEMIDE 80 MG: 10 INJECTION, SOLUTION INTRAMUSCULAR; INTRAVENOUS at 06:04

## 2019-04-24 RX ADMIN — METOPROLOL TARTRATE 12.5 MG: 25 TABLET, FILM COATED ORAL at 09:04

## 2019-04-24 RX ADMIN — POTASSIUM CHLORIDE 40 MEQ: 1500 TABLET, EXTENDED RELEASE ORAL at 09:04

## 2019-04-24 RX ADMIN — METOLAZONE 2.5 MG: 2.5 TABLET ORAL at 05:04

## 2019-04-24 RX ADMIN — PANTOPRAZOLE SODIUM 40 MG: 40 TABLET, DELAYED RELEASE ORAL at 09:04

## 2019-04-24 RX ADMIN — FUROSEMIDE 80 MG: 10 INJECTION, SOLUTION INTRAMUSCULAR; INTRAVENOUS at 10:04

## 2019-04-24 RX ADMIN — OXYCODONE HYDROCHLORIDE AND ACETAMINOPHEN 1 TABLET: 5; 325 TABLET ORAL at 09:04

## 2019-04-24 RX ADMIN — METOLAZONE 2.5 MG: 2.5 TABLET ORAL at 09:04

## 2019-04-24 RX ADMIN — ASPIRIN 81 MG: 81 TABLET, COATED ORAL at 09:04

## 2019-04-24 RX ADMIN — VITAMIN D, TAB 1000IU (100/BT) 2000 UNITS: 25 TAB at 09:04

## 2019-04-24 RX ADMIN — ONDANSETRON 8 MG: 8 TABLET, ORALLY DISINTEGRATING ORAL at 09:04

## 2019-04-24 NOTE — SUBJECTIVE & OBJECTIVE
Interval History: NAEON. Headache resolved. Going for heart cath tomorrow.     Review of Systems   Constitution: Negative for malaise/fatigue.   Cardiovascular: Negative for chest pain, claudication, dyspnea on exertion, irregular heartbeat, leg swelling and palpitations.   Respiratory: Negative for cough and shortness of breath.    Hematologic/Lymphatic: Negative for bleeding problem.   Gastrointestinal: Negative for abdominal pain.   Genitourinary: Negative for dysuria.   Neurological: Negative for headaches and weakness.     Medications:  Continuous Infusions:   milrinone 20mg/100ml D5W (200mcg/ml) Stopped (04/23/19 1000)     Scheduled Meds:   aspirin  81 mg Oral Daily    furosemide  80 mg Intravenous BID    metOLazone  2.5 mg Oral BID    metoprolol tartrate  12.5 mg Oral BID    pantoprazole  40 mg Oral Daily    polyethylene glycol  17 g Oral Daily    potassium chloride  40 mEq Oral Once    potassium chloride SA  40 mEq Oral BID    vitamin D  2,000 Units Oral Daily     PRN Meds:acetaminophen, fluticasone, ondansetron, oxyCODONE-acetaminophen, promethazine (PHENERGAN) IVPB, promethazine, sodium chloride 0.9%     Objective:     Vital Signs (Most Recent):  Temp: 98.6 °F (37 °C) (04/24/19 1122)  Pulse: 74 (04/24/19 1534)  Resp: 18 (04/24/19 1122)  BP: 112/76 (04/24/19 1200)  SpO2: 97 % (04/24/19 1122) Vital Signs (24h Range):  Temp:  [96.6 °F (35.9 °C)-98.6 °F (37 °C)] 98.6 °F (37 °C)  Pulse:  [66-88] 74  Resp:  [16-18] 18  SpO2:  [91 %-98 %] 97 %  BP: ()/(52-76) 112/76     Weight: 75.8 kg (167 lb)  Body mass index is 26.16 kg/m².    SpO2: 97 %  O2 Device (Oxygen Therapy): room air    Intake/Output - Last 3 Shifts       04/22 0700 - 04/23 0659 04/23 0700 - 04/24 0659 04/24 0700 - 04/25 0659    P.O. 360 720     I.V. (mL/kg)       Total Intake(mL/kg) 360 (4.7) 720 (9.5)     Urine (mL/kg/hr) 950 (0.5) 0 (0)     Total Output 950 0     Net -590 +720            Urine Occurrence 2 x 4 x     Stool Occurrence   1 x           Lines/Drains/Airways     Peripheral Intravenous Line                 Peripheral IV - Single Lumen 04/22/19 0430 22 G Anterior;Left Forearm 2 days                Physical Exam   Constitutional: She is oriented to person, place, and time. She appears well-developed and well-nourished. No distress.   HENT:   Head: Normocephalic and atraumatic.   Eyes: Pupils are equal, round, and reactive to light. EOM are normal.   Neck: Normal range of motion. No JVD present.   Cardiovascular: Normal rate, regular rhythm and normal pulses.   Pulmonary/Chest: Effort normal and breath sounds normal. No respiratory distress.   Abdominal: Soft. She exhibits no distension.   Musculoskeletal: Normal range of motion. She exhibits no edema.   Neurological: She is alert and oriented to person, place, and time.   Skin: Skin is warm, dry and intact. Capillary refill takes less than 2 seconds. She is not diaphoretic. No erythema. No pallor.   Psychiatric: She has a normal mood and affect. Her speech is normal and behavior is normal. Judgment and thought content normal.   Vitals reviewed.      Significant Labs:  BMP:   Recent Labs   Lab 04/24/19  0435   GLU 91      K 3.7   CL 97   CO2 28   BUN 20   CREATININE 0.8   CALCIUM 9.3   MG 2.3     CBC:   Recent Labs   Lab 04/24/19  0435   WBC 6.51   RBC 4.75   HGB 14.5   HCT 44.2      MCV 93   MCH 30.5   MCHC 32.8       Significant Diagnostics:  I have reviewed all pertinent imaging results/findings within the past 24 hours.

## 2019-04-24 NOTE — ASSESSMENT & PLAN NOTE
64 year-old woman with a history of mitral valve prolapse s/p mitral valve repair (May 2018) and now with severe mitral stenosis, as well as paroxysmal atrial fibrillation.  Echocardiogram (4/8/2019) showed 55% ejection fraction, severe mitral stenosis with mean transvalvular gradient of 10mmHg, no mitral regurgitation, trace tricuspid regurgitation, and pulmonary hypertension with estimated systolic PA pressure of 42mmHg.      -Admit to CTS for pre-operative workup/optomization.   - Schedule for redo-sterotomy with mitral valve replacement (bioprosthesis), possible mitral valve repair, possible tricuspid valve repair, and maze procedure with left atrial appendage removal on 4/26  -Cardiac diet, 1500 fluid restriction  -Home medications restarted  -D/C Milrinone 0.25 4/23   -Lasix 80mg IV BID  -CBC, BMP, Mag, Phos, Coags pending, and daily   -CXR pending, and daily   -DVT and GI ppx  - Phenergan and Zofran PRN for nausea   - RHC and Echo tomorrow   -Discussed plan with staff.

## 2019-04-24 NOTE — PROGRESS NOTES
[facility]  Cardiothoracic Surgery  Progress Note    Patient Name: Ericka Lewis  MRN: [id number]  Admission Date: 4/21/2019  Hospital Length of Stay: 3 days  Code Status: Full Code   Attending Physician: Yonatan Mcintosh MD   Referring Provider: Yonatan Mcintosh MD  Principal Problem:Mitral stenosis            Subjective:     Post-Op Info:  Procedure(s) (LRB):  INSERTION, CATHETER, RIGHT HEART (Right)         Interval History: NAEON. Headache resolved. Going for heart cath tomorrow.     Review of Systems   Constitution: Negative for malaise/fatigue.   Cardiovascular: Negative for chest pain, claudication, dyspnea on exertion, irregular heartbeat, leg swelling and palpitations.   Respiratory: Negative for cough and shortness of breath.    Hematologic/Lymphatic: Negative for bleeding problem.   Gastrointestinal: Negative for abdominal pain.   Genitourinary: Negative for dysuria.   Neurological: Negative for headaches and weakness.     Medications:  Continuous Infusions:   milrinone 20mg/100ml D5W (200mcg/ml) Stopped (04/23/19 1000)     Scheduled Meds:   aspirin  81 mg Oral Daily    furosemide  80 mg Intravenous BID    metOLazone  2.5 mg Oral BID    metoprolol tartrate  12.5 mg Oral BID    pantoprazole  40 mg Oral Daily    polyethylene glycol  17 g Oral Daily    potassium chloride  40 mEq Oral Once    potassium chloride SA  40 mEq Oral BID    vitamin D  2,000 Units Oral Daily     PRN Meds:acetaminophen, fluticasone, ondansetron, oxyCODONE-acetaminophen, promethazine (PHENERGAN) IVPB, promethazine, sodium chloride 0.9%     Objective:     Vital Signs (Most Recent):  Temp: 98.6 °F (37 °C) (04/24/19 1122)  Pulse: 74 (04/24/19 1534)  Resp: 18 (04/24/19 1122)  BP: 112/76 (04/24/19 1200)  SpO2: 97 % (04/24/19 1122) Vital Signs (24h Range):  Temp:  [96.6 °F (35.9 °C)-98.6 °F (37 °C)] 98.6 °F (37 °C)  Pulse:  [66-88] 74  Resp:  [16-18] 18  SpO2:  [91 %-98 %] 97 %  BP: ()/(52-76) 112/76      Weight: 75.8 kg (167 lb)  Body mass index is 26.16 kg/m².    SpO2: 97 %  O2 Device (Oxygen Therapy): room air    Intake/Output - Last 3 Shifts       04/22 0700 - 04/23 0659 04/23 0700 - 04/24 0659 04/24 0700 - 04/25 0659    P.O. 360 720     I.V. (mL/kg)       Total Intake(mL/kg) 360 (4.7) 720 (9.5)     Urine (mL/kg/hr) 950 (0.5) 0 (0)     Total Output 950 0     Net -590 +720            Urine Occurrence 2 x 4 x     Stool Occurrence  1 x           Lines/Drains/Airways     Peripheral Intravenous Line                 Peripheral IV - Single Lumen 04/22/19 0430 22 G Anterior;Left Forearm 2 days                Physical Exam   Constitutional: She is oriented to person, place, and time. She appears well-developed and well-nourished. No distress.   HENT:   Head: Normocephalic and atraumatic.   Eyes: Pupils are equal, round, and reactive to light. EOM are normal.   Neck: Normal range of motion. No JVD present.   Cardiovascular: Normal rate, regular rhythm and normal pulses.   Pulmonary/Chest: Effort normal and breath sounds normal. No respiratory distress.   Abdominal: Soft. She exhibits no distension.   Musculoskeletal: Normal range of motion. She exhibits no edema.   Neurological: She is alert and oriented to person, place, and time.   Skin: Skin is warm, dry and intact. Capillary refill takes less than 2 seconds. She is not diaphoretic. No erythema. No pallor.   Psychiatric: She has a normal mood and affect. Her speech is normal and behavior is normal. Judgment and thought content normal.   Vitals reviewed.      Significant Labs:  BMP:   Recent Labs   Lab 04/24/19  0435   GLU 91      K 3.7   CL 97   CO2 28   BUN 20   CREATININE 0.8   CALCIUM 9.3   MG 2.3     CBC:   Recent Labs   Lab 04/24/19 0435   WBC 6.51   RBC 4.75   HGB 14.5   HCT 44.2      MCV 93   MCH 30.5   MCHC 32.8       Significant Diagnostics:  I have reviewed all pertinent imaging results/findings within the past 24 hours.    Assessment/Plan:      * Mitral stenosis  64 year-old woman with a history of mitral valve prolapse s/p mitral valve repair (May 2018) and now with severe mitral stenosis, as well as paroxysmal atrial fibrillation.  Echocardiogram (4/8/2019) showed 55% ejection fraction, severe mitral stenosis with mean transvalvular gradient of 10mmHg, no mitral regurgitation, trace tricuspid regurgitation, and pulmonary hypertension with estimated systolic PA pressure of 42mmHg.      -Admit to CTS for pre-operative workup/optomization.   - Schedule for redo-sterotomy with mitral valve replacement (bioprosthesis), possible mitral valve repair, possible tricuspid valve repair, and maze procedure with left atrial appendage removal on 4/26  -Cardiac diet, 1500 fluid restriction  -Home medications restarted  -D/C Milrinone 0.25 4/23   -Lasix 80mg IV BID  -CBC, BMP, Mag, Phos, Coags pending, and daily   -CXR pending, and daily   -DVT and GI ppx  - Phenergan and Zofran PRN for nausea   - RHC and Echo tomorrow   -Discussed plan with staff.           Shannan Chau PA-C  Cardiothoracic Surgery  [facility]

## 2019-04-24 NOTE — PLAN OF CARE
Problem: Adult Inpatient Plan of Care  Goal: Plan of Care Review  Outcome: Ongoing (interventions implemented as appropriate)  Primacor gtt discontinued. No complaints of headaches. RHC Thursday and possible valve replacement on Friday. VSS. Pt denies SOB or chest pain. Pt remain on tele. Fall precautions maintained. Pt free of falls and injuries. POC discussed with patient/family, verbalized understanding. Questions answered, no distress at present.

## 2019-04-25 ENCOUNTER — ANESTHESIA EVENT (OUTPATIENT)
Dept: SURGERY | Facility: HOSPITAL | Age: 64
DRG: 217 | End: 2019-04-25
Payer: MEDICARE

## 2019-04-25 LAB
ABO + RH BLD: NORMAL
ANION GAP SERPL CALC-SCNC: 13 MMOL/L (ref 8–16)
BASOPHILS # BLD AUTO: 0.05 K/UL (ref 0–0.2)
BASOPHILS NFR BLD: 0.6 % (ref 0–1.9)
BLD GP AB SCN CELLS X3 SERPL QL: NORMAL
BUN SERPL-MCNC: 30 MG/DL (ref 8–23)
CALCIUM SERPL-MCNC: 9.9 MG/DL (ref 8.7–10.5)
CHLORIDE SERPL-SCNC: 91 MMOL/L (ref 95–110)
CO2 SERPL-SCNC: 32 MMOL/L (ref 23–29)
CREAT SERPL-MCNC: 1.2 MG/DL (ref 0.5–1.4)
DIFFERENTIAL METHOD: ABNORMAL
EOSINOPHIL # BLD AUTO: 0.3 K/UL (ref 0–0.5)
EOSINOPHIL NFR BLD: 3.2 % (ref 0–8)
ERYTHROCYTE [DISTWIDTH] IN BLOOD BY AUTOMATED COUNT: 12.3 % (ref 11.5–14.5)
EST. GFR  (AFRICAN AMERICAN): 56 ML/MIN/1.73 M^2
EST. GFR  (NON AFRICAN AMERICAN): 48.6 ML/MIN/1.73 M^2
GLUCOSE SERPL-MCNC: 105 MG/DL (ref 70–110)
HCT VFR BLD AUTO: 48.5 % (ref 37–48.5)
HGB BLD-MCNC: 16.1 G/DL (ref 12–16)
IMM GRANULOCYTES # BLD AUTO: 0.02 K/UL (ref 0–0.04)
IMM GRANULOCYTES NFR BLD AUTO: 0.2 % (ref 0–0.5)
LYMPHOCYTES # BLD AUTO: 2 K/UL (ref 1–4.8)
LYMPHOCYTES NFR BLD: 24.6 % (ref 18–48)
MAGNESIUM SERPL-MCNC: 2.4 MG/DL (ref 1.6–2.6)
MCH RBC QN AUTO: 30.5 PG (ref 27–31)
MCHC RBC AUTO-ENTMCNC: 33.2 G/DL (ref 32–36)
MCV RBC AUTO: 92 FL (ref 82–98)
MONOCYTES # BLD AUTO: 0.8 K/UL (ref 0.3–1)
MONOCYTES NFR BLD: 10.3 % (ref 4–15)
NEUTROPHILS # BLD AUTO: 4.9 K/UL (ref 1.8–7.7)
NEUTROPHILS NFR BLD: 61.1 % (ref 38–73)
NRBC BLD-RTO: 0 /100 WBC
PHOSPHATE SERPL-MCNC: 4.9 MG/DL (ref 2.7–4.5)
PLATELET # BLD AUTO: 240 K/UL (ref 150–350)
PMV BLD AUTO: 11.7 FL (ref 9.2–12.9)
POTASSIUM SERPL-SCNC: 3.5 MMOL/L (ref 3.5–5.1)
RBC # BLD AUTO: 5.28 M/UL (ref 4–5.4)
SODIUM SERPL-SCNC: 136 MMOL/L (ref 136–145)
WBC # BLD AUTO: 8.05 K/UL (ref 3.9–12.7)

## 2019-04-25 PROCEDURE — 25000003 PHARM REV CODE 250: Performed by: PHYSICIAN ASSISTANT

## 2019-04-25 PROCEDURE — 93451 RIGHT HEART CATH: CPT | Performed by: INTERNAL MEDICINE

## 2019-04-25 PROCEDURE — C1894 INTRO/SHEATH, NON-LASER: HCPCS | Performed by: INTERNAL MEDICINE

## 2019-04-25 PROCEDURE — 63600175 PHARM REV CODE 636 W HCPCS: Performed by: INTERNAL MEDICINE

## 2019-04-25 PROCEDURE — 20600001 HC STEP DOWN PRIVATE ROOM

## 2019-04-25 PROCEDURE — 93005 ELECTROCARDIOGRAM TRACING: CPT

## 2019-04-25 PROCEDURE — 86920 COMPATIBILITY TEST SPIN: CPT

## 2019-04-25 PROCEDURE — 25000003 PHARM REV CODE 250: Performed by: STUDENT IN AN ORGANIZED HEALTH CARE EDUCATION/TRAINING PROGRAM

## 2019-04-25 PROCEDURE — 63600175 PHARM REV CODE 636 W HCPCS: Performed by: THORACIC SURGERY (CARDIOTHORACIC VASCULAR SURGERY)

## 2019-04-25 PROCEDURE — 85025 COMPLETE CBC W/AUTO DIFF WBC: CPT

## 2019-04-25 PROCEDURE — 93010 EKG 12-LEAD: ICD-10-PCS | Mod: ,,, | Performed by: INTERNAL MEDICINE

## 2019-04-25 PROCEDURE — 93451 PR RIGHT HEART CATH O2 SATURATION & CARDIAC OUTPUT: ICD-10-PCS | Mod: 26,,, | Performed by: INTERNAL MEDICINE

## 2019-04-25 PROCEDURE — 99152 MOD SED SAME PHYS/QHP 5/>YRS: CPT | Mod: ,,, | Performed by: INTERNAL MEDICINE

## 2019-04-25 PROCEDURE — 80048 BASIC METABOLIC PNL TOTAL CA: CPT

## 2019-04-25 PROCEDURE — 84100 ASSAY OF PHOSPHORUS: CPT

## 2019-04-25 PROCEDURE — C1751 CATH, INF, PER/CENT/MIDLINE: HCPCS | Performed by: INTERNAL MEDICINE

## 2019-04-25 PROCEDURE — 93010 ELECTROCARDIOGRAM REPORT: CPT | Mod: ,,, | Performed by: INTERNAL MEDICINE

## 2019-04-25 PROCEDURE — 99152 PR MOD CONSCIOUS SEDATION, SAME PHYS, 5+ YRS, FIRST 15 MIN: ICD-10-PCS | Mod: ,,, | Performed by: INTERNAL MEDICINE

## 2019-04-25 PROCEDURE — 36415 COLL VENOUS BLD VENIPUNCTURE: CPT

## 2019-04-25 PROCEDURE — 86850 RBC ANTIBODY SCREEN: CPT

## 2019-04-25 PROCEDURE — 93451 RIGHT HEART CATH: CPT | Mod: 26,,, | Performed by: INTERNAL MEDICINE

## 2019-04-25 PROCEDURE — 83735 ASSAY OF MAGNESIUM: CPT

## 2019-04-25 PROCEDURE — 99153 MOD SED SAME PHYS/QHP EA: CPT | Performed by: INTERNAL MEDICINE

## 2019-04-25 PROCEDURE — 25000003 PHARM REV CODE 250: Performed by: THORACIC SURGERY (CARDIOTHORACIC VASCULAR SURGERY)

## 2019-04-25 PROCEDURE — 99152 MOD SED SAME PHYS/QHP 5/>YRS: CPT | Performed by: INTERNAL MEDICINE

## 2019-04-25 RX ORDER — FENTANYL CITRATE 50 UG/ML
INJECTION, SOLUTION INTRAMUSCULAR; INTRAVENOUS
Status: DISCONTINUED | OUTPATIENT
Start: 2019-04-25 | End: 2019-04-25 | Stop reason: HOSPADM

## 2019-04-25 RX ORDER — POTASSIUM CHLORIDE 750 MG/1
10 CAPSULE, EXTENDED RELEASE ORAL ONCE
Status: COMPLETED | OUTPATIENT
Start: 2019-04-25 | End: 2019-04-25

## 2019-04-25 RX ADMIN — OXYCODONE HYDROCHLORIDE AND ACETAMINOPHEN 1 TABLET: 5; 325 TABLET ORAL at 08:04

## 2019-04-25 RX ADMIN — METOPROLOL TARTRATE 12.5 MG: 25 TABLET, FILM COATED ORAL at 08:04

## 2019-04-25 RX ADMIN — POTASSIUM CHLORIDE 40 MEQ: 1500 TABLET, EXTENDED RELEASE ORAL at 08:04

## 2019-04-25 RX ADMIN — POTASSIUM CHLORIDE 10 MEQ: 750 CAPSULE, EXTENDED RELEASE ORAL at 09:04

## 2019-04-25 RX ADMIN — PANTOPRAZOLE SODIUM 40 MG: 40 TABLET, DELAYED RELEASE ORAL at 08:04

## 2019-04-25 RX ADMIN — ASPIRIN 81 MG: 81 TABLET, COATED ORAL at 08:04

## 2019-04-25 RX ADMIN — FUROSEMIDE 80 MG: 10 INJECTION, SOLUTION INTRAMUSCULAR; INTRAVENOUS at 08:04

## 2019-04-25 NOTE — BRIEF OP NOTE
"    Post Cath Note  Referring Physician: Yonatan Mcintosh MD  Procedure: INSERTION, CATHETER, RIGHT HEART (Right)       Access: RIJ    Normal filling pressures, PASP 25mm Hg    See full report for further details    Intervention:   None    Closure device: Manual sheath removal    Post Cath Exam:   /70 (BP Location: Left arm, Patient Position: Lying)   Pulse 77   Temp 98.4 °F (36.9 °C) (Oral)   Resp 18   Ht 5' 7" (1.702 m)   Wt 75.8 kg (167 lb)   SpO2 96%   Breastfeeding? No   BMI 26.16 kg/m²   No unusual pain, hematoma, thrill or bruit at vascular access site.  Distal pulse present without signs of ischemia.    Recommendations:   - Routine post-cath care    Signed:  Srini Kitchen MD  Cardiology Fellow  Pager: [phone]        "

## 2019-04-25 NOTE — PLAN OF CARE
Problem: Adult Inpatient Plan of Care  Goal: Plan of Care Review  Outcome: Ongoing (interventions implemented as appropriate)  NPO after midnight. RHC in the AM. Pt shaved and prepped. VSS. Pt denies SOB or chest pain. Pt remain on tele. Fall precautions maintained. Pt free of falls and injuries. POC discussed with patient/family, verbalized understanding. Questions answered, no distress at present.

## 2019-04-25 NOTE — PLAN OF CARE
Problem: Adult Inpatient Plan of Care  Goal: Plan of Care Review  Outcome: Ongoing (interventions implemented as appropriate)  Plan of care reviewed with patient. Patient remains free from injury. No acute events noted at this time. VS stable. Lasix IVP continued. Plan for right heart cath today. Will continue to monitor.

## 2019-04-25 NOTE — PLAN OF CARE
This patient was referred by Dr. Agustin Nunez.  Please send him a copy of your operative note and discharge summary.

## 2019-04-25 NOTE — ASSESSMENT & PLAN NOTE
64 year-old woman with a history of mitral valve prolapse s/p mitral valve repair (May 2018) and now with severe mitral stenosis, as well as paroxysmal atrial fibrillation.  Echocardiogram (4/8/2019) showed 55% ejection fraction, severe mitral stenosis with mean transvalvular gradient of 10mmHg, no mitral regurgitation, trace tricuspid regurgitation, and pulmonary hypertension with estimated systolic PA pressure of 42mmHg.      -Admit to CTS for pre-operative workup/optomization.   - Schedule for redo-sterotomy with mitral valve replacement (bioprosthesis), possible mitral valve repair, possible tricuspid valve repair, and maze procedure with left atrial appendage removal on 4/26  -Cardiac diet, 1500 fluid restriction  -Home medications restarted  -D/C Milrinone 0.25 4/23   -Lasix 80mg IV BID  -CBC, BMP, Mag, Phos, Coags pending, and daily   -CXR pending, and daily   -DVT and GI ppx  - Phenergan and Zofran PRN for nausea   - RHC today  - Echo yesterday   -Discussed plan with staff.

## 2019-04-25 NOTE — SUBJECTIVE & OBJECTIVE
Interval History: NAEON. Going for RHC today.     Review of Systems   Constitution: Negative for malaise/fatigue.   Cardiovascular: Negative for chest pain, dyspnea on exertion, leg swelling and palpitations.   Respiratory: Negative for cough and shortness of breath.    Hematologic/Lymphatic: Negative for bleeding problem.   Gastrointestinal: Negative for abdominal pain.   Genitourinary: Negative for dysuria.   Neurological: Negative for headaches and weakness.     Medications:  Continuous Infusions:   milrinone 20mg/100ml D5W (200mcg/ml) Stopped (04/23/19 1000)     Scheduled Meds:   sodium chloride 0.9%   Intravenous Once    aspirin  81 mg Oral Daily    furosemide  80 mg Intravenous BID    metoprolol tartrate  12.5 mg Oral BID    pantoprazole  40 mg Oral Daily    polyethylene glycol  17 g Oral Daily    potassium chloride  10 mEq Oral Once    potassium chloride SA  40 mEq Oral BID    vitamin D  2,000 Units Oral Daily     PRN Meds:acetaminophen, diphenhydrAMINE, fluticasone, ondansetron, oxyCODONE-acetaminophen, promethazine (PHENERGAN) IVPB, promethazine, sodium chloride 0.9%     Objective:     Vital Signs (Most Recent):  Temp: 98.6 °F (37 °C) (04/25/19 0813)  Pulse: 74 (04/25/19 0813)  Resp: 16 (04/25/19 0813)  BP: 114/77 (04/25/19 0813)  SpO2: 95 % (04/25/19 0813) Vital Signs (24h Range):  Temp:  [97.1 °F (36.2 °C)-98.6 °F (37 °C)] 98.6 °F (37 °C)  Pulse:  [70-86] 74  Resp:  [16-18] 16  SpO2:  [95 %-97 %] 95 %  BP: (100-118)/(58-80) 114/77     Weight: 75.8 kg (167 lb)  Body mass index is 26.16 kg/m².    SpO2: 95 %  O2 Device (Oxygen Therapy): room air    Intake/Output - Last 3 Shifts       04/23 0700 - 04/24 0659 04/24 0700 - 04/25 0659 04/25 0700 - 04/26 0659    P.O. 720 480     Total Intake(mL/kg) 720 (9.5) 480 (6.3)     Urine (mL/kg/hr) 0 (0) 2500 (1.4)     Total Output 0 2500     Net +720 -2020            Urine Occurrence 4 x      Stool Occurrence 1 x            Lines/Drains/Airways     Peripheral  Intravenous Line                 Peripheral IV - Single Lumen 04/22/19 0430 22 G Anterior;Left Forearm 3 days                Physical Exam   Constitutional: She is oriented to person, place, and time. She appears well-developed and well-nourished. No distress.   HENT:   Head: Normocephalic and atraumatic.   Eyes: Pupils are equal, round, and reactive to light. EOM are normal.   Neck: Normal range of motion. No JVD present.   Cardiovascular: Normal rate, regular rhythm and normal pulses.   Pulmonary/Chest: Effort normal and breath sounds normal. No respiratory distress.   Abdominal: Soft. She exhibits no distension.   Musculoskeletal: Normal range of motion. She exhibits no edema.   Neurological: She is alert and oriented to person, place, and time.   Skin: Skin is warm, dry and intact. Capillary refill takes less than 2 seconds. She is not diaphoretic. No erythema. No pallor.   Psychiatric: She has a normal mood and affect. Her speech is normal and behavior is normal. Judgment and thought content normal.   Vitals reviewed.      Significant Labs:  BMP:   Recent Labs   Lab 04/25/19 0312         K 3.5   CL 91*   CO2 32*   BUN 30*   CREATININE 1.2   CALCIUM 9.9   MG 2.4     CBC:   Recent Labs   Lab 04/25/19 0312   WBC 8.05   RBC 5.28   HGB 16.1*   HCT 48.5      MCV 92   MCH 30.5   MCHC 33.2       Significant Diagnostics:  I have reviewed and interpreted all pertinent imaging results/findings within the past 24 hours.     TTE 4/24/19  · Mild left atrial enlargement.  · Low normal left ventricular systolic function. The estimated ejection fraction is 50%  · S/P mitral valve repair with a mean diastolic gradient of 3 across the valve.  · Mild tricuspid regurgitation.  · Normal central venous pressure (3 mm Hg).  · The estimated PA systolic pressure is 23 mm Hg  · There is prominent trabeculation in the LV apex.

## 2019-04-25 NOTE — ANESTHESIA PREPROCEDURE EVALUATION
[facility]  Anesthesia Pre-Operative Evaluation         Patient Name: Ericka Lewis  YOB: 1955  MRN: [id number]    SUBJECTIVE:     Pre-operative evaluation for Procedure(s) (LRB):  Repair, Cardiac Valve: Mitral Valve repair with Redo Sternotomy (N/A)  REPLACEMENT, MITRAL VALVE (N/A)  REPAIR, TRICUSPID VALVE (N/A)     04/25/2019    Ericka Lewis is a 64 y.o. female w/ a significant PMHx of mitral valve prolapse s/p mitral valve repair (May 2018) and now with severe mitral stenosis, as well as paroxysmal atrial fibrillation.  Echocardiogram (4/8/2019) showed 55% ejection fraction, severe mitral stenosis with mean transvalvular gradient of 10mmHg, no mitral regurgitation, trace tricuspid regurgitation, and pulmonary hypertension with estimated systolic PA pressure of 42mmHg.    Schedule for redo-sterotomy with mitral valve replacement (bioprosthesis), possible mitral valve repair, possible tricuspid valve repair, and maze procedure with left atrial appendage removal on 4/26.    Patient has had a history of PONV but not with recent treatment/ prophylaxis.  She also states that she woke up from most recent surgery with extreme left shoulder pain that was attributed to poor positioning.     Patient now presents for the above procedure(s).    LDA:        Peripheral IV - Single Lumen 04/22/19 0430 22 G Anterior;Left Forearm (Active)   Site Assessment Clean;Dry;Intact;No redness;No swelling 4/24/2019  8:00 PM   Line Status Flushed;Saline locked 4/24/2019  8:00 PM   Dressing Status Clean;Dry;Intact 4/24/2019  8:00 PM   Dressing Intervention Dressing reinforced 4/24/2019  7:50 AM   Dressing Change Due 04/26/19 4/24/2019  8:00 PM   Site Change Due 04/26/19 4/24/2019  8:00 PM   Reason Not Rotated Not due 4/24/2019  8:00 PM   Number of days: 3       Prev airway: None documented.    Drips: None documented.      Patient Active Problem List   Diagnosis    S/P mitral valve repair    Mitral stenosis        Review of patient's allergies indicates:   Allergen Reactions    Kiwi Anaphylaxis    Metronidazole Anaphylaxis, Hives and Other (See Comments)     BREATHING ISSUES AND HIVES         Current Inpatient Medications:      No current facility-administered medications on file prior to encounter.      Current Outpatient Medications on File Prior to Encounter   Medication Sig Dispense Refill    aspirin (ECOTRIN) 81 MG EC tablet Take 81 mg by mouth once daily.       cholecalciferol, vitamin D3, (VITAMIN D3) 2,000 unit Cap Take 2,000 Units by mouth once daily.       diclofenac sodium (VOLTAREN) 1 % Gel Apply 2 g topically once daily.       estradiol (ESTRACE) 1 MG tablet Take 1 mg by mouth once daily.   3    fluticasone (FLONASE) 50 mcg/actuation nasal spray SHAKE LQ AND U 1 SPR IEN D  0    furosemide (LASIX) 40 MG tablet Take 40 mg by mouth 2 (two) times daily.  8    lidocaine (XYLOCAINE) 5 % Oint ointment REGINALDO EXT AA D  0    meloxicam (MOBIC) 15 MG tablet TK 1 T PO D  2    metoprolol succinate (TOPROL-XL) 25 MG 24 hr tablet Take 25 mg by mouth once daily.  1    multivitamin (THERAGRAN) per tablet Take 1 tablet by mouth once daily.       omeprazole (PRILOSEC) 40 MG capsule Take 40 mg by mouth every morning.   1    oxyCODONE-acetaminophen (PERCOCET)  mg per tablet Take 1 tablet by mouth as needed.   0    potassium chloride SA (K-DUR,KLOR-CON) 20 MEQ tablet Take 20 mEq by mouth once daily.   6    promethazine (PHENERGAN) 25 MG tablet Take 25 mg by mouth every 6 (six) hours as needed.      tiZANidine (ZANAFLEX) 4 MG tablet TK 1 T PO QID PRF SPASM  6       Past Surgical History:   Procedure Laterality Date    CARDIAC CATHETERIZATION         Social History     Socioeconomic History    Marital status:      Spouse name: Not on file    Number of children: Not on file    Years of education: Not on file    Highest education level: Not on file   Occupational History    Not on file   Social Needs     Financial resource strain: Not on file    Food insecurity:     Worry: Not on file     Inability: Not on file    Transportation needs:     Medical: Not on file     Non-medical: Not on file   Tobacco Use    Smoking status: Former Smoker    Smokeless tobacco: Never Used   Substance and Sexual Activity    Alcohol use: Not on file     Comment: social    Drug use: Never    Sexual activity: Not on file   Lifestyle    Physical activity:     Days per week: Not on file     Minutes per session: Not on file    Stress: Not on file   Relationships    Social connections:     Talks on phone: Not on file     Gets together: Not on file     Attends Muslim service: Not on file     Active member of club or organization: Not on file     Attends meetings of clubs or organizations: Not on file     Relationship status: Not on file   Other Topics Concern    Not on file   Social History Narrative    Not on file       OBJECTIVE:     Vital Signs Range (Last 24H):  Temp:  [36.2 °C (97.1 °F)-37 °C (98.6 °F)]   Pulse:  [70-86]   Resp:  [16-18]   BP: (100-118)/(58-80)   SpO2:  [95 %-97 %]       Significant Labs:  Lab Results   Component Value Date    WBC 8.05 04/25/2019    HGB 16.1 (H) 04/25/2019    HCT 48.5 04/25/2019     04/25/2019    ALT 19 04/11/2019    AST 23 04/11/2019     04/25/2019    K 3.5 04/25/2019    CL 91 (L) 04/25/2019    CREATININE 1.2 04/25/2019    BUN 30 (H) 04/25/2019    CO2 32 (H) 04/25/2019    INR 0.9 04/21/2019    HGBA1C 5.3 04/11/2019       EKG:  Normal sinus rhythm  Incomplete right bundle branch block  T wave abnormality, consider anterolateral ischemia    2D ECHO:  · Mild left atrial enlargement.  · Low normal left ventricular systolic function. The estimated ejection fraction is 50%  · S/P mitral valve repair with a mean diastolic gradient of 3 across the valve.  · Mild tricuspid regurgitation.  · Normal central venous pressure (3 mm Hg).  · The estimated PA systolic pressure is 23 mm  Hg  · There is prominent trabeculation in the LV apex.      ASSESSMENT/PLAN:       Anesthesia Evaluation    I have reviewed the Patient Summary Reports.     I have reviewed the Medications.     Review of Systems  Anesthesia Hx:  No problems with previous Anesthesia  History of prior surgery of interest to airway management or planning: Personal Hx of Anesthesia complications, Post-Operative Nausea/Vomiting, in the past, but not with recent anesthetics / prophylaxis   Hematology/Oncology:  Hematology Normal   Oncology Normal     Cardiovascular:   Valvular problems/Murmurs (MV stenosis) Dysrhythmias atrial fibrillation ECG has been reviewed. mitral valve prolapse s/p mitral valve repair (May 2018)   Pulmonary:  Pulmonary Normal    Renal/:  Renal/ Normal     Hepatic/GI:  Hepatic/GI Normal    Neurological:  Neurology Normal    Endocrine:  Endocrine Normal        Physical Exam  General:  Well nourished    Airway/Jaw/Neck:  Airway Findings: Mouth Opening: Normal Tongue: Normal  General Airway Assessment: Adult  Mallampati: II  TM Distance: 4 - 6 cm  Jaw/Neck Findings:  Neck ROM: Normal ROM     Eyes/Ears/Nose:  EYES/EARS/NOSE FINDINGS: Normal   Dental:  Dental Findings: In tact   Chest/Lungs:  Chest/Lungs Findings: Clear to auscultation, Normal Respiratory Rate     Heart/Vascular:  Heart Findings: Rate: Normal  Rhythm: Regular Rhythm        Mental Status:  Mental Status Findings:  Cooperative, Alert and Oriented         Anesthesia Plan  Type of Anesthesia, risks & benefits discussed:  Anesthesia Type:  general  Patient's Preference:   Intra-op Monitoring Plan: arterial line, central line and standard ASA monitors  Intra-op Monitoring Plan Comments:   Post Op Pain Control Plan: per primary service following discharge from PACU and multimodal analgesia  Post Op Pain Control Plan Comments:   Induction:   IV  Beta Blocker:         Informed Consent: Patient understands risks and agrees with Anesthesia plan.  Questions  answered. Anesthesia consent signed with patient.  ASA Score: 4     Day of Surgery Review of History & Physical:    H&P update referred to the provider.         Ready For Surgery From Anesthesia Perspective.

## 2019-04-25 NOTE — PROGRESS NOTES
[facility]  Cardiothoracic Surgery  Progress Note    Patient Name: Ericka Lewis  MRN: [id number]  Admission Date: 4/21/2019  Hospital Length of Stay: 4 days  Code Status: Full Code   Attending Physician: Yonatan Mcintosh MD   Referring Provider: Yonatan Mcintosh MD  Principal Problem:Mitral stenosis            Subjective:     Post-Op Info:  Procedure(s) (LRB):  INSERTION, CATHETER, RIGHT HEART (Right)   Day of Surgery     Interval History: NAEON. Going for RHC today.     Review of Systems   Constitution: Negative for malaise/fatigue.   Cardiovascular: Negative for chest pain, dyspnea on exertion, leg swelling and palpitations.   Respiratory: Negative for cough and shortness of breath.    Hematologic/Lymphatic: Negative for bleeding problem.   Gastrointestinal: Negative for abdominal pain.   Genitourinary: Negative for dysuria.   Neurological: Negative for headaches and weakness.     Medications:  Continuous Infusions:   milrinone 20mg/100ml D5W (200mcg/ml) Stopped (04/23/19 1000)     Scheduled Meds:   sodium chloride 0.9%   Intravenous Once    aspirin  81 mg Oral Daily    furosemide  80 mg Intravenous BID    metoprolol tartrate  12.5 mg Oral BID    pantoprazole  40 mg Oral Daily    polyethylene glycol  17 g Oral Daily    potassium chloride  10 mEq Oral Once    potassium chloride SA  40 mEq Oral BID    vitamin D  2,000 Units Oral Daily     PRN Meds:acetaminophen, diphenhydrAMINE, fluticasone, ondansetron, oxyCODONE-acetaminophen, promethazine (PHENERGAN) IVPB, promethazine, sodium chloride 0.9%     Objective:     Vital Signs (Most Recent):  Temp: 98.6 °F (37 °C) (04/25/19 0813)  Pulse: 74 (04/25/19 0813)  Resp: 16 (04/25/19 0813)  BP: 114/77 (04/25/19 0813)  SpO2: 95 % (04/25/19 0813) Vital Signs (24h Range):  Temp:  [97.1 °F (36.2 °C)-98.6 °F (37 °C)] 98.6 °F (37 °C)  Pulse:  [70-86] 74  Resp:  [16-18] 16  SpO2:  [95 %-97 %] 95 %  BP: (100-118)/(58-80) 114/77     Weight: 75.8 kg  (167 lb)  Body mass index is 26.16 kg/m².    SpO2: 95 %  O2 Device (Oxygen Therapy): room air    Intake/Output - Last 3 Shifts       04/23 0700 - 04/24 0659 04/24 0700 - 04/25 0659 04/25 0700 - 04/26 0659    P.O. 720 480     Total Intake(mL/kg) 720 (9.5) 480 (6.3)     Urine (mL/kg/hr) 0 (0) 2500 (1.4)     Total Output 0 2500     Net +720 -2020            Urine Occurrence 4 x      Stool Occurrence 1 x            Lines/Drains/Airways     Peripheral Intravenous Line                 Peripheral IV - Single Lumen 04/22/19 0430 22 G Anterior;Left Forearm 3 days                Physical Exam   Constitutional: She is oriented to person, place, and time. She appears well-developed and well-nourished. No distress.   HENT:   Head: Normocephalic and atraumatic.   Eyes: Pupils are equal, round, and reactive to light. EOM are normal.   Neck: Normal range of motion. No JVD present.   Cardiovascular: Normal rate, regular rhythm and normal pulses.   Pulmonary/Chest: Effort normal and breath sounds normal. No respiratory distress.   Abdominal: Soft. She exhibits no distension.   Musculoskeletal: Normal range of motion. She exhibits no edema.   Neurological: She is alert and oriented to person, place, and time.   Skin: Skin is warm, dry and intact. Capillary refill takes less than 2 seconds. She is not diaphoretic. No erythema. No pallor.   Psychiatric: She has a normal mood and affect. Her speech is normal and behavior is normal. Judgment and thought content normal.   Vitals reviewed.      Significant Labs:  BMP:   Recent Labs   Lab 04/25/19  0312         K 3.5   CL 91*   CO2 32*   BUN 30*   CREATININE 1.2   CALCIUM 9.9   MG 2.4     CBC:   Recent Labs   Lab 04/25/19  0312   WBC 8.05   RBC 5.28   HGB 16.1*   HCT 48.5      MCV 92   MCH 30.5   MCHC 33.2       Significant Diagnostics:  I have reviewed and interpreted all pertinent imaging results/findings within the past 24 hours.     TTE 4/24/19  · Mild left atrial  enlargement.  · Low normal left ventricular systolic function. The estimated ejection fraction is 50%  · S/P mitral valve repair with a mean diastolic gradient of 3 across the valve.  · Mild tricuspid regurgitation.  · Normal central venous pressure (3 mm Hg).  · The estimated PA systolic pressure is 23 mm Hg  · There is prominent trabeculation in the LV apex.    Assessment/Plan:     * Mitral stenosis  64 year-old woman with a history of mitral valve prolapse s/p mitral valve repair (May 2018) and now with severe mitral stenosis, as well as paroxysmal atrial fibrillation.  Echocardiogram (4/8/2019) showed 55% ejection fraction, severe mitral stenosis with mean transvalvular gradient of 10mmHg, no mitral regurgitation, trace tricuspid regurgitation, and pulmonary hypertension with estimated systolic PA pressure of 42mmHg.      -Admit to CTS for pre-operative workup/optomization.   - Schedule for redo-sterotomy with mitral valve replacement (bioprosthesis), possible mitral valve repair, possible tricuspid valve repair, and maze procedure with left atrial appendage removal on 4/26  -Cardiac diet, 1500 fluid restriction  -Home medications restarted  -D/C Milrinone 0.25 4/23   -Lasix 80mg IV BID  -CBC, BMP, Mag, Phos, Coags pending, and daily   -CXR pending, and daily   -DVT and GI ppx  - Phenergan and Zofran PRN for nausea   - RHC today  - Echo yesterday   -Discussed plan with staff.           Shannan Chau PA-C  Cardiothoracic Surgery  [facility]

## 2019-04-26 ENCOUNTER — ANESTHESIA (OUTPATIENT)
Dept: SURGERY | Facility: HOSPITAL | Age: 64
DRG: 217 | End: 2019-04-26
Payer: MEDICARE

## 2019-04-26 PROBLEM — R73.9 HYPERGLYCEMIA: Status: ACTIVE | Noted: 2019-04-26

## 2019-04-26 LAB
ALLENS TEST: ABNORMAL
ANION GAP SERPL CALC-SCNC: 16 MMOL/L (ref 8–16)
ANION GAP SERPL CALC-SCNC: 17 MMOL/L (ref 8–16)
APTT BLDCRRT: 44.8 SEC (ref 21–32)
BASOPHILS # BLD AUTO: 0.01 K/UL (ref 0–0.2)
BASOPHILS # BLD AUTO: 0.03 K/UL (ref 0–0.2)
BASOPHILS # BLD AUTO: 0.05 K/UL (ref 0–0.2)
BASOPHILS NFR BLD: 0.1 % (ref 0–1.9)
BASOPHILS NFR BLD: 0.1 % (ref 0–1.9)
BASOPHILS NFR BLD: 0.8 % (ref 0–1.9)
BUN SERPL-MCNC: 25 MG/DL (ref 8–23)
BUN SERPL-MCNC: 36 MG/DL (ref 8–23)
CALCIUM SERPL-MCNC: 9.5 MG/DL (ref 8.7–10.5)
CALCIUM SERPL-MCNC: 9.6 MG/DL (ref 8.7–10.5)
CHLORIDE SERPL-SCNC: 104 MMOL/L (ref 95–110)
CHLORIDE SERPL-SCNC: 93 MMOL/L (ref 95–110)
CO2 SERPL-SCNC: 21 MMOL/L (ref 23–29)
CO2 SERPL-SCNC: 22 MMOL/L (ref 23–29)
CREAT SERPL-MCNC: 0.9 MG/DL (ref 0.5–1.4)
CREAT SERPL-MCNC: 1.1 MG/DL (ref 0.5–1.4)
DELSYS: ABNORMAL
DIFFERENTIAL METHOD: ABNORMAL
EOSINOPHIL # BLD AUTO: 0 K/UL (ref 0–0.5)
EOSINOPHIL # BLD AUTO: 0 K/UL (ref 0–0.5)
EOSINOPHIL # BLD AUTO: 0.2 K/UL (ref 0–0.5)
EOSINOPHIL NFR BLD: 0.1 % (ref 0–8)
EOSINOPHIL NFR BLD: 0.1 % (ref 0–8)
EOSINOPHIL NFR BLD: 3.5 % (ref 0–8)
ERYTHROCYTE [DISTWIDTH] IN BLOOD BY AUTOMATED COUNT: 12.2 % (ref 11.5–14.5)
ERYTHROCYTE [DISTWIDTH] IN BLOOD BY AUTOMATED COUNT: 12.3 % (ref 11.5–14.5)
ERYTHROCYTE [DISTWIDTH] IN BLOOD BY AUTOMATED COUNT: 12.3 % (ref 11.5–14.5)
ERYTHROCYTE [SEDIMENTATION RATE] IN BLOOD BY WESTERGREN METHOD: 15 MM/H
EST. GFR  (AFRICAN AMERICAN): >60 ML/MIN/1.73 M^2
EST. GFR  (AFRICAN AMERICAN): >60 ML/MIN/1.73 M^2
EST. GFR  (NON AFRICAN AMERICAN): 53.9 ML/MIN/1.73 M^2
EST. GFR  (NON AFRICAN AMERICAN): >60 ML/MIN/1.73 M^2
FIBRINOGEN PPP-MCNC: 161 MG/DL (ref 182–366)
FIBRINOGEN PPP-MCNC: <70 MG/DL (ref 182–366)
FIO2: 28
FIO2: 65
FIO2: 65
FIO2: 70
FIO2: 70
FIO2: 80
FIO2: 80
FLOW: 2
GLUCOSE SERPL-MCNC: 113 MG/DL (ref 70–110)
GLUCOSE SERPL-MCNC: 117 MG/DL (ref 70–110)
GLUCOSE SERPL-MCNC: 139 MG/DL (ref 70–110)
GLUCOSE SERPL-MCNC: 180 MG/DL (ref 70–110)
GLUCOSE SERPL-MCNC: 194 MG/DL (ref 70–110)
GLUCOSE SERPL-MCNC: 212 MG/DL (ref 70–110)
GLUCOSE SERPL-MCNC: 219 MG/DL (ref 70–110)
GLUCOSE SERPL-MCNC: 249 MG/DL (ref 70–110)
GLUCOSE SERPL-MCNC: 261 MG/DL (ref 70–110)
GLUCOSE SERPL-MCNC: 264 MG/DL (ref 70–110)
GLUCOSE SERPL-MCNC: 88 MG/DL (ref 70–110)
HCO3 UR-SCNC: 21.7 MMOL/L (ref 24–28)
HCO3 UR-SCNC: 21.7 MMOL/L (ref 24–28)
HCO3 UR-SCNC: 23 MMOL/L (ref 24–28)
HCO3 UR-SCNC: 23 MMOL/L (ref 24–28)
HCO3 UR-SCNC: 23.1 MMOL/L (ref 24–28)
HCO3 UR-SCNC: 23.1 MMOL/L (ref 24–28)
HCO3 UR-SCNC: 24.5 MMOL/L (ref 24–28)
HCO3 UR-SCNC: 24.8 MMOL/L (ref 24–28)
HCO3 UR-SCNC: 25.3 MMOL/L (ref 24–28)
HCO3 UR-SCNC: 25.4 MMOL/L (ref 24–28)
HCO3 UR-SCNC: 25.7 MMOL/L (ref 24–28)
HCO3 UR-SCNC: 25.8 MMOL/L (ref 24–28)
HCO3 UR-SCNC: 26.2 MMOL/L (ref 24–28)
HCO3 UR-SCNC: 26.5 MMOL/L (ref 24–28)
HCO3 UR-SCNC: 26.8 MMOL/L (ref 24–28)
HCO3 UR-SCNC: 34.5 MMOL/L (ref 24–28)
HCT VFR BLD AUTO: 24.3 % (ref 37–48.5)
HCT VFR BLD AUTO: 35.2 % (ref 37–48.5)
HCT VFR BLD AUTO: 49.4 % (ref 37–48.5)
HCT VFR BLD CALC: 20 %PCV (ref 36–54)
HCT VFR BLD CALC: 23 %PCV (ref 36–54)
HCT VFR BLD CALC: 24 %PCV (ref 36–54)
HCT VFR BLD CALC: 25 %PCV (ref 36–54)
HCT VFR BLD CALC: 26 %PCV (ref 36–54)
HCT VFR BLD CALC: 29 %PCV (ref 36–54)
HCT VFR BLD CALC: 30 %PCV (ref 36–54)
HCT VFR BLD CALC: 31 %PCV (ref 36–54)
HCT VFR BLD CALC: 31 %PCV (ref 36–54)
HCT VFR BLD CALC: 32 %PCV (ref 36–54)
HCT VFR BLD CALC: 32 %PCV (ref 36–54)
HCT VFR BLD CALC: 39 %PCV (ref 36–54)
HGB BLD-MCNC: 11.4 G/DL (ref 12–16)
HGB BLD-MCNC: 16.5 G/DL (ref 12–16)
HGB BLD-MCNC: 8.1 G/DL (ref 12–16)
IMM GRANULOCYTES # BLD AUTO: 0.01 K/UL (ref 0–0.04)
IMM GRANULOCYTES # BLD AUTO: 0.05 K/UL (ref 0–0.04)
IMM GRANULOCYTES # BLD AUTO: 0.11 K/UL (ref 0–0.04)
IMM GRANULOCYTES NFR BLD AUTO: 0.2 % (ref 0–0.5)
IMM GRANULOCYTES NFR BLD AUTO: 0.3 % (ref 0–0.5)
IMM GRANULOCYTES NFR BLD AUTO: 0.5 % (ref 0–0.5)
INR PPP: 1.3 (ref 0.8–1.2)
INR PPP: 3.9 (ref 0.8–1.2)
LDH SERPL L TO P-CCNC: 3.2 MMOL/L (ref 0.36–1.25)
LDH SERPL L TO P-CCNC: 3.31 MMOL/L (ref 0.36–1.25)
LDH SERPL L TO P-CCNC: 5.09 MMOL/L (ref 0.36–1.25)
LDH SERPL L TO P-CCNC: 6.53 MMOL/L (ref 0.36–1.25)
LDH SERPL L TO P-CCNC: 7.22 MMOL/L (ref 0.36–1.25)
LDH SERPL L TO P-CCNC: 7.68 MMOL/L (ref 0.36–1.25)
LYMPHOCYTES # BLD AUTO: 1.9 K/UL (ref 1–4.8)
LYMPHOCYTES # BLD AUTO: 2.2 K/UL (ref 1–4.8)
LYMPHOCYTES # BLD AUTO: 3.1 K/UL (ref 1–4.8)
LYMPHOCYTES NFR BLD: 14.7 % (ref 18–48)
LYMPHOCYTES NFR BLD: 15 % (ref 18–48)
LYMPHOCYTES NFR BLD: 30 % (ref 18–48)
MAGNESIUM SERPL-MCNC: 2.5 MG/DL (ref 1.6–2.6)
MAGNESIUM SERPL-MCNC: 3.5 MG/DL (ref 1.6–2.6)
MAGNESIUM SERPL-MCNC: 3.5 MG/DL (ref 1.6–2.6)
MCH RBC QN AUTO: 30.7 PG (ref 27–31)
MCH RBC QN AUTO: 31 PG (ref 27–31)
MCH RBC QN AUTO: 31.2 PG (ref 27–31)
MCHC RBC AUTO-ENTMCNC: 32.4 G/DL (ref 32–36)
MCHC RBC AUTO-ENTMCNC: 33.3 G/DL (ref 32–36)
MCHC RBC AUTO-ENTMCNC: 33.4 G/DL (ref 32–36)
MCV RBC AUTO: 93 FL (ref 82–98)
MCV RBC AUTO: 94 FL (ref 82–98)
MCV RBC AUTO: 95 FL (ref 82–98)
MODE: ABNORMAL
MONOCYTES # BLD AUTO: 0.3 K/UL (ref 0.3–1)
MONOCYTES # BLD AUTO: 0.7 K/UL (ref 0.3–1)
MONOCYTES # BLD AUTO: 1.1 K/UL (ref 0.3–1)
MONOCYTES NFR BLD: 11.2 % (ref 4–15)
MONOCYTES NFR BLD: 2 % (ref 4–15)
MONOCYTES NFR BLD: 5.2 % (ref 4–15)
NEUTROPHILS # BLD AUTO: 12.1 K/UL (ref 1.8–7.7)
NEUTROPHILS # BLD AUTO: 16.3 K/UL (ref 1.8–7.7)
NEUTROPHILS # BLD AUTO: 3.5 K/UL (ref 1.8–7.7)
NEUTROPHILS NFR BLD: 54.3 % (ref 38–73)
NEUTROPHILS NFR BLD: 79.1 % (ref 38–73)
NEUTROPHILS NFR BLD: 82.8 % (ref 38–73)
NRBC BLD-RTO: 0 /100 WBC
PCO2 BLDA: 37.9 MMHG (ref 35–45)
PCO2 BLDA: 39.6 MMHG (ref 35–45)
PCO2 BLDA: 40 MMHG (ref 35–45)
PCO2 BLDA: 41.2 MMHG (ref 35–45)
PCO2 BLDA: 43.4 MMHG (ref 35–45)
PCO2 BLDA: 45.1 MMHG (ref 35–45)
PCO2 BLDA: 45.7 MMHG (ref 35–45)
PCO2 BLDA: 46.2 MMHG (ref 35–45)
PCO2 BLDA: 46.3 MMHG (ref 35–45)
PCO2 BLDA: 46.6 MMHG (ref 35–45)
PCO2 BLDA: 47.3 MMHG (ref 35–45)
PCO2 BLDA: 47.5 MMHG (ref 35–45)
PCO2 BLDA: 47.7 MMHG (ref 35–45)
PCO2 BLDA: 49.5 MMHG (ref 35–45)
PCO2 BLDA: 51.6 MMHG (ref 35–45)
PCO2 BLDA: 64.6 MMHG (ref 35–45)
PH SMN: 7.16 [PH] (ref 7.35–7.45)
PH SMN: 7.26 [PH] (ref 7.35–7.45)
PH SMN: 7.27 [PH] (ref 7.35–7.45)
PH SMN: 7.29 [PH] (ref 7.35–7.45)
PH SMN: 7.32 [PH] (ref 7.35–7.45)
PH SMN: 7.32 [PH] (ref 7.35–7.45)
PH SMN: 7.33 [PH] (ref 7.35–7.45)
PH SMN: 7.34 [PH] (ref 7.35–7.45)
PH SMN: 7.36 [PH] (ref 7.35–7.45)
PH SMN: 7.37 [PH] (ref 7.35–7.45)
PH SMN: 7.37 [PH] (ref 7.35–7.45)
PH SMN: 7.4 [PH] (ref 7.35–7.45)
PH SMN: 7.46 [PH] (ref 7.35–7.45)
PH SMN: 7.55 [PH] (ref 7.35–7.45)
PHOSPHATE SERPL-MCNC: 4.1 MG/DL (ref 2.7–4.5)
PHOSPHATE SERPL-MCNC: 4.7 MG/DL (ref 2.7–4.5)
PHOSPHATE SERPL-MCNC: 4.7 MG/DL (ref 2.7–4.5)
PLATELET # BLD AUTO: 121 K/UL (ref 150–350)
PLATELET # BLD AUTO: 238 K/UL (ref 150–350)
PLATELET # BLD AUTO: 91 K/UL (ref 150–350)
PMV BLD AUTO: 11.2 FL (ref 9.2–12.9)
PMV BLD AUTO: 11.3 FL (ref 9.2–12.9)
PMV BLD AUTO: 11.4 FL (ref 9.2–12.9)
PO2 BLDA: 102 MMHG (ref 80–100)
PO2 BLDA: 107 MMHG (ref 80–100)
PO2 BLDA: 128 MMHG (ref 80–100)
PO2 BLDA: 249 MMHG (ref 80–100)
PO2 BLDA: 337 MMHG (ref 80–100)
PO2 BLDA: 380 MMHG (ref 80–100)
PO2 BLDA: 385 MMHG (ref 80–100)
PO2 BLDA: 39 MMHG (ref 80–100)
PO2 BLDA: 40 MMHG (ref 40–60)
PO2 BLDA: 447 MMHG (ref 80–100)
PO2 BLDA: 80 MMHG (ref 80–100)
PO2 BLDA: 81 MMHG (ref 80–100)
PO2 BLDA: 84 MMHG (ref 80–100)
PO2 BLDA: 84 MMHG (ref 80–100)
PO2 BLDA: 85 MMHG (ref 80–100)
PO2 BLDA: 87 MMHG (ref 80–100)
POC BE: -1 MMOL/L
POC BE: -2 MMOL/L
POC BE: -3 MMOL/L
POC BE: -3 MMOL/L
POC BE: -4 MMOL/L
POC BE: -4 MMOL/L
POC BE: -5 MMOL/L
POC BE: -6 MMOL/L
POC BE: 0 MMOL/L
POC BE: 1 MMOL/L
POC BE: 1 MMOL/L
POC BE: 12 MMOL/L
POC BE: 3 MMOL/L
POC IONIZED CALCIUM: 0.99 MMOL/L (ref 1.06–1.42)
POC IONIZED CALCIUM: 1.02 MMOL/L (ref 1.06–1.42)
POC IONIZED CALCIUM: 1.03 MMOL/L (ref 1.06–1.42)
POC IONIZED CALCIUM: 1.09 MMOL/L (ref 1.06–1.42)
POC IONIZED CALCIUM: 1.23 MMOL/L (ref 1.06–1.42)
POC IONIZED CALCIUM: 1.29 MMOL/L (ref 1.06–1.42)
POC IONIZED CALCIUM: 1.29 MMOL/L (ref 1.06–1.42)
POC IONIZED CALCIUM: 1.32 MMOL/L (ref 1.06–1.42)
POC IONIZED CALCIUM: 1.36 MMOL/L (ref 1.06–1.42)
POC IONIZED CALCIUM: 1.57 MMOL/L (ref 1.06–1.42)
POC IONIZED CALCIUM: 1.96 MMOL/L (ref 1.06–1.42)
POC SATURATED O2: 100 % (ref 95–100)
POC SATURATED O2: 70 % (ref 95–100)
POC SATURATED O2: 72 % (ref 95–100)
POC SATURATED O2: 91 % (ref 95–100)
POC SATURATED O2: 94 % (ref 95–100)
POC SATURATED O2: 95 % (ref 95–100)
POC SATURATED O2: 95 % (ref 95–100)
POC SATURATED O2: 96 % (ref 95–100)
POC SATURATED O2: 96 % (ref 95–100)
POC SATURATED O2: 97 % (ref 95–100)
POC SATURATED O2: 98 % (ref 95–100)
POC SATURATED O2: 99 % (ref 95–100)
POC TCO2: 23 MMOL/L (ref 23–27)
POC TCO2: 23 MMOL/L (ref 23–27)
POC TCO2: 24 MMOL/L (ref 23–27)
POC TCO2: 24 MMOL/L (ref 23–27)
POC TCO2: 25 MMOL/L (ref 23–27)
POC TCO2: 25 MMOL/L (ref 23–27)
POC TCO2: 26 MMOL/L (ref 23–27)
POC TCO2: 26 MMOL/L (ref 23–27)
POC TCO2: 27 MMOL/L (ref 23–27)
POC TCO2: 28 MMOL/L (ref 23–27)
POC TCO2: 28 MMOL/L (ref 23–27)
POC TCO2: 28 MMOL/L (ref 24–29)
POC TCO2: 36 MMOL/L (ref 23–27)
POCT GLUCOSE: 115 MG/DL (ref 70–110)
POCT GLUCOSE: 126 MG/DL (ref 70–110)
POCT GLUCOSE: 128 MG/DL (ref 70–110)
POCT GLUCOSE: 131 MG/DL (ref 70–110)
POCT GLUCOSE: 132 MG/DL (ref 70–110)
POCT GLUCOSE: 134 MG/DL (ref 70–110)
POCT GLUCOSE: 136 MG/DL (ref 70–110)
POCT GLUCOSE: 143 MG/DL (ref 70–110)
POCT GLUCOSE: 143 MG/DL (ref 70–110)
POTASSIUM BLD-SCNC: 2.8 MMOL/L (ref 3.5–5.1)
POTASSIUM BLD-SCNC: 2.8 MMOL/L (ref 3.5–5.1)
POTASSIUM BLD-SCNC: 3 MMOL/L (ref 3.5–5.1)
POTASSIUM BLD-SCNC: 3 MMOL/L (ref 3.5–5.1)
POTASSIUM BLD-SCNC: 3.1 MMOL/L (ref 3.5–5.1)
POTASSIUM BLD-SCNC: 3.1 MMOL/L (ref 3.5–5.1)
POTASSIUM BLD-SCNC: 3.2 MMOL/L (ref 3.5–5.1)
POTASSIUM BLD-SCNC: 3.3 MMOL/L (ref 3.5–5.1)
POTASSIUM BLD-SCNC: 3.4 MMOL/L (ref 3.5–5.1)
POTASSIUM BLD-SCNC: 3.5 MMOL/L (ref 3.5–5.1)
POTASSIUM BLD-SCNC: 3.9 MMOL/L (ref 3.5–5.1)
POTASSIUM BLD-SCNC: 4.1 MMOL/L (ref 3.5–5.1)
POTASSIUM SERPL-SCNC: 3.1 MMOL/L (ref 3.5–5.1)
POTASSIUM SERPL-SCNC: 4.2 MMOL/L (ref 3.5–5.1)
POTASSIUM SERPL-SCNC: 4.3 MMOL/L (ref 3.5–5.1)
PROTHROMBIN TIME: 12.7 SEC (ref 9–12.5)
PROTHROMBIN TIME: 37.9 SEC (ref 9–12.5)
RBC # BLD AUTO: 2.6 M/UL (ref 4–5.4)
RBC # BLD AUTO: 3.71 M/UL (ref 4–5.4)
RBC # BLD AUTO: 5.33 M/UL (ref 4–5.4)
SAMPLE: ABNORMAL
SITE: ABNORMAL
SODIUM BLD-SCNC: 132 MMOL/L (ref 136–145)
SODIUM BLD-SCNC: 133 MMOL/L (ref 136–145)
SODIUM BLD-SCNC: 133 MMOL/L (ref 136–145)
SODIUM BLD-SCNC: 134 MMOL/L (ref 136–145)
SODIUM BLD-SCNC: 134 MMOL/L (ref 136–145)
SODIUM BLD-SCNC: 135 MMOL/L (ref 136–145)
SODIUM BLD-SCNC: 135 MMOL/L (ref 136–145)
SODIUM BLD-SCNC: 137 MMOL/L (ref 136–145)
SODIUM BLD-SCNC: 137 MMOL/L (ref 136–145)
SODIUM BLD-SCNC: 140 MMOL/L (ref 136–145)
SODIUM BLD-SCNC: 140 MMOL/L (ref 136–145)
SODIUM BLD-SCNC: 141 MMOL/L (ref 136–145)
SODIUM SERPL-SCNC: 132 MMOL/L (ref 136–145)
SODIUM SERPL-SCNC: 141 MMOL/L (ref 136–145)
SP02: 100
SP02: 98
SP02: 98
WBC # BLD AUTO: 14.6 K/UL (ref 3.9–12.7)
WBC # BLD AUTO: 20.58 K/UL (ref 3.9–12.7)
WBC # BLD AUTO: 6.34 K/UL (ref 3.9–12.7)

## 2019-04-26 PROCEDURE — 94640 AIRWAY INHALATION TREATMENT: CPT

## 2019-04-26 PROCEDURE — 25000003 PHARM REV CODE 250: Performed by: PHYSICIAN ASSISTANT

## 2019-04-26 PROCEDURE — 83735 ASSAY OF MAGNESIUM: CPT | Mod: 91

## 2019-04-26 PROCEDURE — 85520 HEPARIN ASSAY: CPT

## 2019-04-26 PROCEDURE — 80048 BASIC METABOLIC PNL TOTAL CA: CPT

## 2019-04-26 PROCEDURE — 63600175 PHARM REV CODE 636 W HCPCS: Performed by: ANESTHESIOLOGY

## 2019-04-26 PROCEDURE — 85610 PROTHROMBIN TIME: CPT | Mod: 91

## 2019-04-26 PROCEDURE — 88305 TISSUE EXAM BY PATHOLOGIST: CPT | Mod: 26,,, | Performed by: PATHOLOGY

## 2019-04-26 PROCEDURE — 93503 SWAN GANZ LINE: ICD-10-PCS | Mod: 59,,, | Performed by: ANESTHESIOLOGY

## 2019-04-26 PROCEDURE — 84132 ASSAY OF SERUM POTASSIUM: CPT

## 2019-04-26 PROCEDURE — 99223 PR INITIAL HOSPITAL CARE,LEVL III: ICD-10-PCS | Mod: ,,, | Performed by: NURSE PRACTITIONER

## 2019-04-26 PROCEDURE — 84100 ASSAY OF PHOSPHORUS: CPT | Mod: 91

## 2019-04-26 PROCEDURE — D9220A PRA ANESTHESIA: ICD-10-PCS | Mod: ,,, | Performed by: ANESTHESIOLOGY

## 2019-04-26 PROCEDURE — 83605 ASSAY OF LACTIC ACID: CPT

## 2019-04-26 PROCEDURE — 85025 COMPLETE CBC W/AUTO DIFF WBC: CPT

## 2019-04-26 PROCEDURE — 85384 FIBRINOGEN ACTIVITY: CPT

## 2019-04-26 PROCEDURE — 63600175 PHARM REV CODE 636 W HCPCS: Performed by: STUDENT IN AN ORGANIZED HEALTH CARE EDUCATION/TRAINING PROGRAM

## 2019-04-26 PROCEDURE — 36592 COLLECT BLOOD FROM PICC: CPT

## 2019-04-26 PROCEDURE — C2618 PROBE/NEEDLE, CRYO: HCPCS | Performed by: THORACIC SURGERY (CARDIOTHORACIC VASCULAR SURGERY)

## 2019-04-26 PROCEDURE — 99223 1ST HOSP IP/OBS HIGH 75: CPT | Mod: ,,, | Performed by: NURSE PRACTITIONER

## 2019-04-26 PROCEDURE — 85610 PROTHROMBIN TIME: CPT

## 2019-04-26 PROCEDURE — 36556 PR INSERT NON-TUNNEL CV CATH 5+ YRS OLD: ICD-10-PCS | Mod: 59,,, | Performed by: ANESTHESIOLOGY

## 2019-04-26 PROCEDURE — 93503 INSERT/PLACE HEART CATHETER: CPT | Mod: 59,,, | Performed by: ANESTHESIOLOGY

## 2019-04-26 PROCEDURE — 27201037 HC PRESSURE MONITORING SET UP

## 2019-04-26 PROCEDURE — 93010 EKG 12-LEAD: ICD-10-PCS | Mod: ,,, | Performed by: INTERNAL MEDICINE

## 2019-04-26 PROCEDURE — 85014 HEMATOCRIT: CPT

## 2019-04-26 PROCEDURE — 37000009 HC ANESTHESIA EA ADD 15 MINS: Performed by: THORACIC SURGERY (CARDIOTHORACIC VASCULAR SURGERY)

## 2019-04-26 PROCEDURE — 20000000 HC ICU ROOM

## 2019-04-26 PROCEDURE — 27000445 HC TEMPORARY PACEMAKER LEADS

## 2019-04-26 PROCEDURE — 33430 REPLACEMENT OF MITRAL VALVE: CPT | Mod: ,,, | Performed by: THORACIC SURGERY (CARDIOTHORACIC VASCULAR SURGERY)

## 2019-04-26 PROCEDURE — 93312 PR ECHO HEART,TRANSESOPHAGEAL: ICD-10-PCS | Mod: 26,59,, | Performed by: ANESTHESIOLOGY

## 2019-04-26 PROCEDURE — 25000003 PHARM REV CODE 250: Performed by: STUDENT IN AN ORGANIZED HEALTH CARE EDUCATION/TRAINING PROGRAM

## 2019-04-26 PROCEDURE — 88305 TISSUE EXAM BY PATHOLOGIST: CPT | Performed by: PATHOLOGY

## 2019-04-26 PROCEDURE — 33259 PR ABLATE/ RECONSTUCT ATRIA, W OTHER PROCED EXTENS W/ BYPASS: ICD-10-PCS | Mod: ,,, | Performed by: THORACIC SURGERY (CARDIOTHORACIC VASCULAR SURGERY)

## 2019-04-26 PROCEDURE — 82803 BLOOD GASES ANY COMBINATION: CPT

## 2019-04-26 PROCEDURE — 85730 THROMBOPLASTIN TIME PARTIAL: CPT

## 2019-04-26 PROCEDURE — 94660 CPAP INITIATION&MGMT: CPT

## 2019-04-26 PROCEDURE — 82330 ASSAY OF CALCIUM: CPT

## 2019-04-26 PROCEDURE — 36000713 HC OR TIME LEV V EA ADD 15 MIN: Performed by: THORACIC SURGERY (CARDIOTHORACIC VASCULAR SURGERY)

## 2019-04-26 PROCEDURE — 80048 BASIC METABOLIC PNL TOTAL CA: CPT | Mod: 91

## 2019-04-26 PROCEDURE — 25000242 PHARM REV CODE 250 ALT 637 W/ HCPCS: Performed by: STUDENT IN AN ORGANIZED HEALTH CARE EDUCATION/TRAINING PROGRAM

## 2019-04-26 PROCEDURE — 25000003 PHARM REV CODE 250: Performed by: ANESTHESIOLOGY

## 2019-04-26 PROCEDURE — 27000175 HC ADULT BYPASS PUMP

## 2019-04-26 PROCEDURE — 93312 ECHO TRANSESOPHAGEAL: CPT | Mod: 26,59,, | Performed by: ANESTHESIOLOGY

## 2019-04-26 PROCEDURE — 27000191 HC C-V MONITORING

## 2019-04-26 PROCEDURE — 93005 ELECTROCARDIOGRAM TRACING: CPT

## 2019-04-26 PROCEDURE — 63600175 PHARM REV CODE 636 W HCPCS: Performed by: PHYSICIAN ASSISTANT

## 2019-04-26 PROCEDURE — 37000008 HC ANESTHESIA 1ST 15 MINUTES: Performed by: THORACIC SURGERY (CARDIOTHORACIC VASCULAR SURGERY)

## 2019-04-26 PROCEDURE — 85384 FIBRINOGEN ACTIVITY: CPT | Mod: 91

## 2019-04-26 PROCEDURE — 27000221 HC OXYGEN, UP TO 24 HOURS

## 2019-04-26 PROCEDURE — 99900035 HC TECH TIME PER 15 MIN (STAT)

## 2019-04-26 PROCEDURE — 36000712 HC OR TIME LEV V 1ST 15 MIN: Performed by: THORACIC SURGERY (CARDIOTHORACIC VASCULAR SURGERY)

## 2019-04-26 PROCEDURE — 37799 UNLISTED PX VASCULAR SURGERY: CPT

## 2019-04-26 PROCEDURE — 36415 COLL VENOUS BLD VENIPUNCTURE: CPT

## 2019-04-26 PROCEDURE — 93010 ELECTROCARDIOGRAM REPORT: CPT | Mod: ,,, | Performed by: INTERNAL MEDICINE

## 2019-04-26 PROCEDURE — D9220A PRA ANESTHESIA: Mod: ,,, | Performed by: ANESTHESIOLOGY

## 2019-04-26 PROCEDURE — 83735 ASSAY OF MAGNESIUM: CPT

## 2019-04-26 PROCEDURE — 88305 TISSUE SPECIMEN TO PATHOLOGY - SURGERY: ICD-10-PCS | Mod: 26,,, | Performed by: PATHOLOGY

## 2019-04-26 PROCEDURE — 33530 CORONARY ARTERY BYPASS/REOP: CPT | Mod: ,,, | Performed by: THORACIC SURGERY (CARDIOTHORACIC VASCULAR SURGERY)

## 2019-04-26 PROCEDURE — 27100171 HC OXYGEN HIGH FLOW UP TO 24 HOURS

## 2019-04-26 PROCEDURE — 36556 INSERT NON-TUNNEL CV CATH: CPT | Mod: 59,,, | Performed by: ANESTHESIOLOGY

## 2019-04-26 PROCEDURE — 63600175 PHARM REV CODE 636 W HCPCS: Performed by: THORACIC SURGERY (CARDIOTHORACIC VASCULAR SURGERY)

## 2019-04-26 PROCEDURE — C9113 INJ PANTOPRAZOLE SODIUM, VIA: HCPCS | Performed by: STUDENT IN AN ORGANIZED HEALTH CARE EDUCATION/TRAINING PROGRAM

## 2019-04-26 PROCEDURE — 84295 ASSAY OF SERUM SODIUM: CPT

## 2019-04-26 PROCEDURE — 63600175 PHARM REV CODE 636 W HCPCS: Mod: JG | Performed by: STUDENT IN AN ORGANIZED HEALTH CARE EDUCATION/TRAINING PROGRAM

## 2019-04-26 PROCEDURE — 27800595 HC HEART VALVES

## 2019-04-26 PROCEDURE — 27201423 OPTIME MED/SURG SUP & DEVICES STERILE SUPPLY: Performed by: THORACIC SURGERY (CARDIOTHORACIC VASCULAR SURGERY)

## 2019-04-26 PROCEDURE — P9045 ALBUMIN (HUMAN), 5%, 250 ML: HCPCS | Mod: JG | Performed by: STUDENT IN AN ORGANIZED HEALTH CARE EDUCATION/TRAINING PROGRAM

## 2019-04-26 PROCEDURE — C1729 CATH, DRAINAGE: HCPCS | Performed by: THORACIC SURGERY (CARDIOTHORACIC VASCULAR SURGERY)

## 2019-04-26 PROCEDURE — 94761 N-INVAS EAR/PLS OXIMETRY MLT: CPT

## 2019-04-26 PROCEDURE — 36620 INSERTION CATHETER ARTERY: CPT | Mod: 59,,, | Performed by: ANESTHESIOLOGY

## 2019-04-26 PROCEDURE — 27200953 HC CARDIOPLEGIA SYSTEM

## 2019-04-26 PROCEDURE — 33430 PR REPLACEMENT OF MITRAL VALVE: ICD-10-PCS | Mod: ,,, | Performed by: THORACIC SURGERY (CARDIOTHORACIC VASCULAR SURGERY)

## 2019-04-26 PROCEDURE — 87070 CULTURE OTHR SPECIMN AEROBIC: CPT

## 2019-04-26 PROCEDURE — 36620 PR INSERT CATH,ART,PERCUT,SHORTTERM: ICD-10-PCS | Mod: 59,,, | Performed by: ANESTHESIOLOGY

## 2019-04-26 PROCEDURE — 25000003 PHARM REV CODE 250: Performed by: THORACIC SURGERY (CARDIOTHORACIC VASCULAR SURGERY)

## 2019-04-26 PROCEDURE — 33259 ABLATE ATRIA W/BYPASS ADD-ON: CPT | Mod: ,,, | Performed by: THORACIC SURGERY (CARDIOTHORACIC VASCULAR SURGERY)

## 2019-04-26 PROCEDURE — 33530 PR CABG, REOPERATE >1 MON ORIG: ICD-10-PCS | Mod: ,,, | Performed by: THORACIC SURGERY (CARDIOTHORACIC VASCULAR SURGERY)

## 2019-04-26 PROCEDURE — 84100 ASSAY OF PHOSPHORUS: CPT

## 2019-04-26 PROCEDURE — 27100088 HC CELL SAVER

## 2019-04-26 PROCEDURE — 27000190 HC CPAP FULL FACE MASK W/VALVE

## 2019-04-26 RX ORDER — ONDANSETRON 2 MG/ML
INJECTION INTRAMUSCULAR; INTRAVENOUS
Status: DISCONTINUED | OUTPATIENT
Start: 2019-04-26 | End: 2019-04-26

## 2019-04-26 RX ORDER — MUPIROCIN 20 MG/G
OINTMENT TOPICAL
Status: DISCONTINUED | OUTPATIENT
Start: 2019-04-26 | End: 2019-04-26 | Stop reason: HOSPADM

## 2019-04-26 RX ORDER — OXYCODONE HYDROCHLORIDE 10 MG/1
10 TABLET ORAL EVERY 4 HOURS PRN
Status: DISCONTINUED | OUTPATIENT
Start: 2019-04-26 | End: 2019-04-27

## 2019-04-26 RX ORDER — IPRATROPIUM BROMIDE AND ALBUTEROL SULFATE 2.5; .5 MG/3ML; MG/3ML
3 SOLUTION RESPIRATORY (INHALATION) EVERY 4 HOURS
Status: COMPLETED | OUTPATIENT
Start: 2019-04-26 | End: 2019-04-27

## 2019-04-26 RX ORDER — CEFAZOLIN SODIUM 1 G/3ML
2 INJECTION, POWDER, FOR SOLUTION INTRAMUSCULAR; INTRAVENOUS
Status: COMPLETED | OUTPATIENT
Start: 2019-04-26 | End: 2019-04-26

## 2019-04-26 RX ORDER — DEXTROSE MONOHYDRATE, SODIUM CHLORIDE, AND POTASSIUM CHLORIDE 50; 1.49; 4.5 G/1000ML; G/1000ML; G/1000ML
INJECTION, SOLUTION INTRAVENOUS CONTINUOUS
Status: DISCONTINUED | OUTPATIENT
Start: 2019-04-26 | End: 2019-04-27

## 2019-04-26 RX ORDER — MAGNESIUM SULFATE HEPTAHYDRATE 40 MG/ML
2 INJECTION, SOLUTION INTRAVENOUS
Status: DISCONTINUED | OUTPATIENT
Start: 2019-04-26 | End: 2019-04-29

## 2019-04-26 RX ORDER — POTASSIUM CHLORIDE 20 MEQ/1
20 TABLET, EXTENDED RELEASE ORAL EVERY 12 HOURS
Status: CANCELLED | OUTPATIENT
Start: 2019-04-26

## 2019-04-26 RX ORDER — ALBUMIN HUMAN 50 G/1000ML
SOLUTION INTRAVENOUS
Status: DISPENSED
Start: 2019-04-26 | End: 2019-04-27

## 2019-04-26 RX ORDER — HEPARIN SODIUM 1000 [USP'U]/ML
INJECTION, SOLUTION INTRAVENOUS; SUBCUTANEOUS
Status: DISCONTINUED | OUTPATIENT
Start: 2019-04-26 | End: 2019-04-26

## 2019-04-26 RX ORDER — ASPIRIN 325 MG
325 TABLET ORAL DAILY
Status: DISCONTINUED | OUTPATIENT
Start: 2019-04-26 | End: 2019-04-28

## 2019-04-26 RX ORDER — NITROGLYCERIN 5 MG/ML
INJECTION, SOLUTION INTRAVENOUS
Status: DISCONTINUED | OUTPATIENT
Start: 2019-04-26 | End: 2019-04-26

## 2019-04-26 RX ORDER — CEFAZOLIN SODIUM 1 G/3ML
2 INJECTION, POWDER, FOR SOLUTION INTRAMUSCULAR; INTRAVENOUS
Status: COMPLETED | OUTPATIENT
Start: 2019-04-26 | End: 2019-04-28

## 2019-04-26 RX ORDER — NOREPINEPHRINE BITARTRATE/D5W 4MG/250ML
0.02 PLASTIC BAG, INJECTION (ML) INTRAVENOUS CONTINUOUS
Status: DISCONTINUED | OUTPATIENT
Start: 2019-04-26 | End: 2019-04-27

## 2019-04-26 RX ORDER — PROPOFOL 10 MG/ML
VIAL (ML) INTRAVENOUS
Status: DISCONTINUED | OUTPATIENT
Start: 2019-04-26 | End: 2019-04-26

## 2019-04-26 RX ORDER — OXYCODONE HYDROCHLORIDE 5 MG/1
5 TABLET ORAL EVERY 4 HOURS PRN
Status: DISCONTINUED | OUTPATIENT
Start: 2019-04-26 | End: 2019-04-27

## 2019-04-26 RX ORDER — METOCLOPRAMIDE HYDROCHLORIDE 5 MG/ML
5 INJECTION INTRAMUSCULAR; INTRAVENOUS EVERY 6 HOURS PRN
Status: DISCONTINUED | OUTPATIENT
Start: 2019-04-26 | End: 2019-05-02

## 2019-04-26 RX ORDER — ATORVASTATIN CALCIUM 20 MG/1
40 TABLET, FILM COATED ORAL NIGHTLY
Status: CANCELLED | OUTPATIENT
Start: 2019-04-26

## 2019-04-26 RX ORDER — IPRATROPIUM BROMIDE AND ALBUTEROL SULFATE 2.5; .5 MG/3ML; MG/3ML
3 SOLUTION RESPIRATORY (INHALATION) EVERY 4 HOURS PRN
Status: ACTIVE | OUTPATIENT
Start: 2019-04-26 | End: 2019-04-27

## 2019-04-26 RX ORDER — TRANEXAMIC ACID 100 MG/ML
INJECTION, SOLUTION INTRAVENOUS
Status: DISCONTINUED | OUTPATIENT
Start: 2019-04-26 | End: 2019-04-26

## 2019-04-26 RX ORDER — ASPIRIN 325 MG
325 TABLET ORAL ONCE
Status: DISCONTINUED | OUTPATIENT
Start: 2019-04-26 | End: 2019-04-28

## 2019-04-26 RX ORDER — HYDROMORPHONE HYDROCHLORIDE 1 MG/ML
1 INJECTION, SOLUTION INTRAMUSCULAR; INTRAVENOUS; SUBCUTANEOUS
Status: DISCONTINUED | OUTPATIENT
Start: 2019-04-26 | End: 2019-04-27

## 2019-04-26 RX ORDER — MUPIROCIN 20 MG/G
1 OINTMENT TOPICAL 2 TIMES DAILY
Status: COMPLETED | OUTPATIENT
Start: 2019-04-26 | End: 2019-05-01

## 2019-04-26 RX ORDER — PANTOPRAZOLE SODIUM 40 MG/10ML
40 INJECTION, POWDER, LYOPHILIZED, FOR SOLUTION INTRAVENOUS DAILY
Status: DISCONTINUED | OUTPATIENT
Start: 2019-04-26 | End: 2019-04-29

## 2019-04-26 RX ORDER — SODIUM CHLORIDE 9 MG/ML
INJECTION, SOLUTION INTRAVENOUS CONTINUOUS PRN
Status: DISCONTINUED | OUTPATIENT
Start: 2019-04-26 | End: 2019-04-26

## 2019-04-26 RX ORDER — ALBUMIN HUMAN 50 G/1000ML
12.5 SOLUTION INTRAVENOUS ONCE
Status: COMPLETED | OUTPATIENT
Start: 2019-04-26 | End: 2019-04-26

## 2019-04-26 RX ORDER — ASPIRIN 325 MG
325 TABLET, DELAYED RELEASE (ENTERIC COATED) ORAL DAILY
Status: CANCELLED | OUTPATIENT
Start: 2019-04-26

## 2019-04-26 RX ORDER — ASPIRIN 300 MG/1
300 SUPPOSITORY RECTAL ONCE AS NEEDED
Status: DISCONTINUED | OUTPATIENT
Start: 2019-04-26 | End: 2019-04-29

## 2019-04-26 RX ORDER — MAGNESIUM SULFATE HEPTAHYDRATE 40 MG/ML
4 INJECTION, SOLUTION INTRAVENOUS
Status: DISCONTINUED | OUTPATIENT
Start: 2019-04-26 | End: 2019-04-29

## 2019-04-26 RX ORDER — DOCUSATE SODIUM 100 MG/1
100 CAPSULE, LIQUID FILLED ORAL 2 TIMES DAILY
Status: DISCONTINUED | OUTPATIENT
Start: 2019-04-26 | End: 2019-05-02 | Stop reason: HOSPADM

## 2019-04-26 RX ORDER — ACETAMINOPHEN 325 MG/1
650 TABLET ORAL EVERY 4 HOURS PRN
Status: DISCONTINUED | OUTPATIENT
Start: 2019-04-26 | End: 2019-04-27

## 2019-04-26 RX ORDER — ONDANSETRON 2 MG/ML
4 INJECTION INTRAMUSCULAR; INTRAVENOUS EVERY 12 HOURS PRN
Status: DISCONTINUED | OUTPATIENT
Start: 2019-04-26 | End: 2019-04-27

## 2019-04-26 RX ORDER — POTASSIUM CHLORIDE 14.9 MG/ML
60 INJECTION INTRAVENOUS
Status: DISCONTINUED | OUTPATIENT
Start: 2019-04-26 | End: 2019-04-29

## 2019-04-26 RX ORDER — TRANEXAMIC ACID 100 MG/ML
1 INJECTION, SOLUTION INTRAVENOUS CONTINUOUS
Status: ACTIVE | OUTPATIENT
Start: 2019-04-26 | End: 2019-04-26

## 2019-04-26 RX ORDER — POTASSIUM CHLORIDE 29.8 MG/ML
40 INJECTION INTRAVENOUS
Status: DISCONTINUED | OUTPATIENT
Start: 2019-04-26 | End: 2019-04-29

## 2019-04-26 RX ORDER — PANTOPRAZOLE SODIUM 40 MG/1
40 TABLET, DELAYED RELEASE ORAL
Status: CANCELLED | OUTPATIENT
Start: 2019-04-27

## 2019-04-26 RX ORDER — ROCURONIUM BROMIDE 10 MG/ML
INJECTION, SOLUTION INTRAVENOUS
Status: DISCONTINUED | OUTPATIENT
Start: 2019-04-26 | End: 2019-04-26

## 2019-04-26 RX ORDER — ALBUMIN HUMAN 50 G/1000ML
25 SOLUTION INTRAVENOUS ONCE
Status: COMPLETED | OUTPATIENT
Start: 2019-04-26 | End: 2019-04-26

## 2019-04-26 RX ORDER — GLYCOPYRROLATE 0.2 MG/ML
INJECTION INTRAMUSCULAR; INTRAVENOUS
Status: DISCONTINUED | OUTPATIENT
Start: 2019-04-26 | End: 2019-04-26

## 2019-04-26 RX ORDER — FENTANYL CITRATE 50 UG/ML
INJECTION, SOLUTION INTRAMUSCULAR; INTRAVENOUS
Status: DISCONTINUED | OUTPATIENT
Start: 2019-04-26 | End: 2019-04-26

## 2019-04-26 RX ORDER — PROTAMINE SULFATE 10 MG/ML
INJECTION, SOLUTION INTRAVENOUS
Status: DISCONTINUED | OUTPATIENT
Start: 2019-04-26 | End: 2019-04-26

## 2019-04-26 RX ORDER — SODIUM,POTASSIUM PHOSPHATES 280-250MG
1 POWDER IN PACKET (EA) ORAL ONCE
Status: DISCONTINUED | OUTPATIENT
Start: 2019-04-26 | End: 2019-04-29

## 2019-04-26 RX ORDER — BISACODYL 10 MG
10 SUPPOSITORY, RECTAL RECTAL EVERY 12 HOURS PRN
Status: DISCONTINUED | OUTPATIENT
Start: 2019-04-26 | End: 2019-05-02

## 2019-04-26 RX ORDER — NEOSTIGMINE METHYLSULFATE 1 MG/ML
INJECTION, SOLUTION INTRAVENOUS
Status: DISCONTINUED | OUTPATIENT
Start: 2019-04-26 | End: 2019-04-26

## 2019-04-26 RX ORDER — KETAMINE HCL IN 0.9 % NACL 50 MG/5 ML
SYRINGE (ML) INTRAVENOUS
Status: DISCONTINUED | OUTPATIENT
Start: 2019-04-26 | End: 2019-04-26

## 2019-04-26 RX ORDER — TRANEXAMIC ACID 100 MG/ML
INJECTION, SOLUTION INTRAVENOUS CONTINUOUS PRN
Status: DISCONTINUED | OUTPATIENT
Start: 2019-04-26 | End: 2019-04-26

## 2019-04-26 RX ORDER — ACETAMINOPHEN 10 MG/ML
INJECTION, SOLUTION INTRAVENOUS
Status: DISCONTINUED | OUTPATIENT
Start: 2019-04-26 | End: 2019-04-26

## 2019-04-26 RX ORDER — SODIUM CHLORIDE 0.9 % (FLUSH) 0.9 %
10 SYRINGE (ML) INJECTION
Status: DISCONTINUED | OUTPATIENT
Start: 2019-04-26 | End: 2019-05-02

## 2019-04-26 RX ORDER — MIDAZOLAM HYDROCHLORIDE 1 MG/ML
INJECTION INTRAMUSCULAR; INTRAVENOUS
Status: DISCONTINUED | OUTPATIENT
Start: 2019-04-26 | End: 2019-04-26

## 2019-04-26 RX ORDER — PROTAMINE SULFATE 10 MG/ML
50 INJECTION, SOLUTION INTRAVENOUS ONCE
Status: COMPLETED | OUTPATIENT
Start: 2019-04-26 | End: 2019-04-26

## 2019-04-26 RX ORDER — POTASSIUM CHLORIDE 14.9 MG/ML
20 INJECTION INTRAVENOUS
Status: DISCONTINUED | OUTPATIENT
Start: 2019-04-26 | End: 2019-04-29

## 2019-04-26 RX ORDER — ALBUMIN HUMAN 50 G/1000ML
500 SOLUTION INTRAVENOUS ONCE AS NEEDED
Status: DISCONTINUED | OUTPATIENT
Start: 2019-04-26 | End: 2019-05-02

## 2019-04-26 RX ORDER — POTASSIUM CHLORIDE 14.9 MG/ML
INJECTION INTRAVENOUS CONTINUOUS PRN
Status: DISCONTINUED | OUTPATIENT
Start: 2019-04-26 | End: 2019-04-26

## 2019-04-26 RX ORDER — POLYETHYLENE GLYCOL 3350 17 G/17G
17 POWDER, FOR SOLUTION ORAL DAILY
Status: DISCONTINUED | OUTPATIENT
Start: 2019-04-26 | End: 2019-05-02 | Stop reason: HOSPADM

## 2019-04-26 RX ORDER — LIDOCAINE HCL/PF 100 MG/5ML
SYRINGE (ML) INTRAVENOUS
Status: DISCONTINUED | OUTPATIENT
Start: 2019-04-26 | End: 2019-04-26

## 2019-04-26 RX ORDER — DEXMEDETOMIDINE HYDROCHLORIDE 100 UG/ML
INJECTION, SOLUTION INTRAVENOUS
Status: DISCONTINUED | OUTPATIENT
Start: 2019-04-26 | End: 2019-04-26

## 2019-04-26 RX ORDER — VASOPRESSIN 20 [USP'U]/ML
INJECTION, SOLUTION INTRAMUSCULAR; SUBCUTANEOUS
Status: DISCONTINUED | OUTPATIENT
Start: 2019-04-26 | End: 2019-04-26

## 2019-04-26 RX ADMIN — GLYCOPYRROLATE 600 MCG: 0.2 INJECTION, SOLUTION INTRAMUSCULAR; INTRAVENOUS at 01:04

## 2019-04-26 RX ADMIN — CEFAZOLIN 2 G: 330 INJECTION, POWDER, FOR SOLUTION INTRAMUSCULAR; INTRAVENOUS at 12:04

## 2019-04-26 RX ADMIN — Medication 10 MG: at 01:04

## 2019-04-26 RX ADMIN — FENTANYL CITRATE 250 MCG: 50 INJECTION, SOLUTION INTRAMUSCULAR; INTRAVENOUS at 07:04

## 2019-04-26 RX ADMIN — HYDROMORPHONE HYDROCHLORIDE 1 MG: 1 INJECTION, SOLUTION INTRAMUSCULAR; INTRAVENOUS; SUBCUTANEOUS at 11:04

## 2019-04-26 RX ADMIN — TRANEXAMIC ACID 750 MG: 100 INJECTION, SOLUTION INTRAVENOUS at 08:04

## 2019-04-26 RX ADMIN — Medication 30 MG: at 08:04

## 2019-04-26 RX ADMIN — ALBUMIN HUMAN 25 G: 0.05 INJECTION, SOLUTION INTRAVENOUS at 02:04

## 2019-04-26 RX ADMIN — EPINEPHRINE 0.06 MCG/KG/MIN: 1 INJECTION INTRAMUSCULAR; INTRAVENOUS; SUBCUTANEOUS at 12:04

## 2019-04-26 RX ADMIN — HYDROMORPHONE HYDROCHLORIDE 1 MG: 1 INJECTION, SOLUTION INTRAMUSCULAR; INTRAVENOUS; SUBCUTANEOUS at 07:04

## 2019-04-26 RX ADMIN — ACETAMINOPHEN 1000 MG: 10 INJECTION, SOLUTION INTRAVENOUS at 08:04

## 2019-04-26 RX ADMIN — FENTANYL CITRATE 25 MCG: 50 INJECTION, SOLUTION INTRAMUSCULAR; INTRAVENOUS at 02:04

## 2019-04-26 RX ADMIN — MUPIROCIN 1 G: 20 OINTMENT TOPICAL at 09:04

## 2019-04-26 RX ADMIN — TRANEXAMIC ACID 1 MG/KG/HR: 100 INJECTION, SOLUTION INTRAVENOUS at 08:04

## 2019-04-26 RX ADMIN — ACETAMINOPHEN 650 MG: 325 TABLET ORAL at 04:04

## 2019-04-26 RX ADMIN — HYDROMORPHONE HYDROCHLORIDE 1 MG: 1 INJECTION, SOLUTION INTRAMUSCULAR; INTRAVENOUS; SUBCUTANEOUS at 09:04

## 2019-04-26 RX ADMIN — VASOPRESSIN 2 UNITS: 20 INJECTION INTRAVENOUS at 12:04

## 2019-04-26 RX ADMIN — SODIUM CHLORIDE, SODIUM GLUCONATE, SODIUM ACETATE, POTASSIUM CHLORIDE, MAGNESIUM CHLORIDE, SODIUM PHOSPHATE, DIBASIC, AND POTASSIUM PHOSPHATE: .53; .5; .37; .037; .03; .012; .00082 INJECTION, SOLUTION INTRAVENOUS at 12:04

## 2019-04-26 RX ADMIN — DOCUSATE SODIUM 100 MG: 100 CAPSULE, LIQUID FILLED ORAL at 08:04

## 2019-04-26 RX ADMIN — EPINEPHRINE 0.04 MCG/KG/MIN: 1 INJECTION INTRAMUSCULAR; INTRAVENOUS; SUBCUTANEOUS at 03:04

## 2019-04-26 RX ADMIN — ROCURONIUM BROMIDE 100 MG: 10 INJECTION, SOLUTION INTRAVENOUS at 07:04

## 2019-04-26 RX ADMIN — PROTAMINE SULFATE 175 MG: 10 INJECTION, SOLUTION INTRAVENOUS at 12:04

## 2019-04-26 RX ADMIN — ROCURONIUM BROMIDE 50 MG: 10 INJECTION, SOLUTION INTRAVENOUS at 08:04

## 2019-04-26 RX ADMIN — HYDROMORPHONE HYDROCHLORIDE 1 MG: 1 INJECTION, SOLUTION INTRAMUSCULAR; INTRAVENOUS; SUBCUTANEOUS at 03:04

## 2019-04-26 RX ADMIN — NEOSTIGMINE METHYLSULFATE 4 MG: 1 INJECTION INTRAVENOUS at 01:04

## 2019-04-26 RX ADMIN — CALCIUM CHLORIDE 0.5 G: 100 INJECTION, SOLUTION INTRAVENOUS at 07:04

## 2019-04-26 RX ADMIN — OXYCODONE HYDROCHLORIDE 10 MG: 10 TABLET ORAL at 08:04

## 2019-04-26 RX ADMIN — NOREPINEPHRINE BITARTRATE 0.02 MCG/KG/MIN: 1 INJECTION, SOLUTION, CONCENTRATE INTRAVENOUS at 08:04

## 2019-04-26 RX ADMIN — SODIUM CHLORIDE, SODIUM GLUCONATE, SODIUM ACETATE, POTASSIUM CHLORIDE, MAGNESIUM CHLORIDE, SODIUM PHOSPHATE, DIBASIC, AND POTASSIUM PHOSPHATE: .53; .5; .37; .037; .03; .012; .00082 INJECTION, SOLUTION INTRAVENOUS at 07:04

## 2019-04-26 RX ADMIN — CALCIUM CHLORIDE 0.5 G: 100 INJECTION, SOLUTION INTRAVENOUS at 08:04

## 2019-04-26 RX ADMIN — MIDAZOLAM HYDROCHLORIDE 2 MG: 1 INJECTION, SOLUTION INTRAMUSCULAR; INTRAVENOUS at 06:04

## 2019-04-26 RX ADMIN — FENTANYL CITRATE 100 MCG: 50 INJECTION, SOLUTION INTRAMUSCULAR; INTRAVENOUS at 01:04

## 2019-04-26 RX ADMIN — OXYCODONE HYDROCHLORIDE 10 MG: 10 TABLET ORAL at 04:04

## 2019-04-26 RX ADMIN — INSULIN HUMAN 2 UNITS: 100 INJECTION, SOLUTION PARENTERAL at 12:04

## 2019-04-26 RX ADMIN — ONDANSETRON 4 MG: 2 INJECTION INTRAMUSCULAR; INTRAVENOUS at 01:04

## 2019-04-26 RX ADMIN — SODIUM CHLORIDE, SODIUM GLUCONATE, SODIUM ACETATE, POTASSIUM CHLORIDE, MAGNESIUM CHLORIDE, SODIUM PHOSPHATE, DIBASIC, AND POTASSIUM PHOSPHATE: .53; .5; .37; .037; .03; .012; .00082 INJECTION, SOLUTION INTRAVENOUS at 08:04

## 2019-04-26 RX ADMIN — ASPIRIN 325 MG ORAL TABLET 325 MG: 325 PILL ORAL at 04:04

## 2019-04-26 RX ADMIN — PROPOFOL 50 MG: 10 INJECTION, EMULSION INTRAVENOUS at 07:04

## 2019-04-26 RX ADMIN — NITROGLYCERIN 0.04 MCG: 5 INJECTION, SOLUTION INTRAVENOUS at 01:04

## 2019-04-26 RX ADMIN — IPRATROPIUM BROMIDE AND ALBUTEROL SULFATE 3 ML: .5; 3 SOLUTION RESPIRATORY (INHALATION) at 07:04

## 2019-04-26 RX ADMIN — CEFAZOLIN 2 G: 330 INJECTION, POWDER, FOR SOLUTION INTRAMUSCULAR; INTRAVENOUS at 08:04

## 2019-04-26 RX ADMIN — CALCIUM CHLORIDE 1 G: 100 INJECTION, SOLUTION INTRAVENOUS at 12:04

## 2019-04-26 RX ADMIN — MIDAZOLAM HYDROCHLORIDE 2 MG: 1 INJECTION, SOLUTION INTRAMUSCULAR; INTRAVENOUS at 07:04

## 2019-04-26 RX ADMIN — POTASSIUM CHLORIDE: 14.9 INJECTION, SOLUTION INTRAVENOUS at 01:04

## 2019-04-26 RX ADMIN — POTASSIUM CHLORIDE 40 MEQ: 400 INJECTION, SOLUTION INTRAVENOUS at 05:04

## 2019-04-26 RX ADMIN — HEPARIN SODIUM 25000 UNITS: 1000 INJECTION, SOLUTION INTRAVENOUS; SUBCUTANEOUS at 09:04

## 2019-04-26 RX ADMIN — MUPIROCIN: 20 OINTMENT TOPICAL at 06:04

## 2019-04-26 RX ADMIN — LIDOCAINE HYDROCHLORIDE 100 MG: 20 INJECTION, SOLUTION INTRAVENOUS at 07:04

## 2019-04-26 RX ADMIN — SODIUM CHLORIDE 4 UNITS/HR: 9 INJECTION, SOLUTION INTRAVENOUS at 10:04

## 2019-04-26 RX ADMIN — INSULIN HUMAN 6 UNITS: 100 INJECTION, SOLUTION PARENTERAL at 11:04

## 2019-04-26 RX ADMIN — PROTAMINE SULFATE 50 MG: 10 INJECTION, SOLUTION INTRAVENOUS at 08:04

## 2019-04-26 RX ADMIN — DEXTROSE, SODIUM CHLORIDE, AND POTASSIUM CHLORIDE: 5; .45; .15 INJECTION INTRAVENOUS at 02:04

## 2019-04-26 RX ADMIN — DEXMEDETOMIDINE HYDROCHLORIDE 20 MCG: 100 INJECTION, SOLUTION INTRAVENOUS at 01:04

## 2019-04-26 RX ADMIN — ALBUMIN HUMAN 12.5 G: 0.05 INJECTION, SOLUTION INTRAVENOUS at 05:04

## 2019-04-26 RX ADMIN — LIDOCAINE HYDROCHLORIDE 100 MG: 20 INJECTION, SOLUTION INTRAVENOUS at 08:04

## 2019-04-26 RX ADMIN — SODIUM CHLORIDE: 0.9 INJECTION, SOLUTION INTRAVENOUS at 06:04

## 2019-04-26 RX ADMIN — ROCURONIUM BROMIDE 50 MG: 10 INJECTION, SOLUTION INTRAVENOUS at 09:04

## 2019-04-26 RX ADMIN — DEXMEDETOMIDINE HYDROCHLORIDE 20 MCG: 100 INJECTION, SOLUTION INTRAVENOUS at 02:04

## 2019-04-26 RX ADMIN — CEFAZOLIN 2 G: 1 INJECTION, POWDER, FOR SOLUTION INTRAMUSCULAR; INTRAVENOUS at 08:04

## 2019-04-26 RX ADMIN — ONDANSETRON 4 MG: 2 INJECTION INTRAMUSCULAR; INTRAVENOUS at 02:04

## 2019-04-26 RX ADMIN — FENTANYL CITRATE 250 MCG: 50 INJECTION, SOLUTION INTRAMUSCULAR; INTRAVENOUS at 08:04

## 2019-04-26 RX ADMIN — PANTOPRAZOLE SODIUM 40 MG: 40 INJECTION, POWDER, LYOPHILIZED, FOR SOLUTION INTRAVENOUS at 03:04

## 2019-04-26 RX ADMIN — POTASSIUM CHLORIDE 40 MEQ: 400 INJECTION, SOLUTION INTRAVENOUS at 02:04

## 2019-04-26 RX ADMIN — SODIUM CHLORIDE, SODIUM GLUCONATE, SODIUM ACETATE, POTASSIUM CHLORIDE, MAGNESIUM CHLORIDE, SODIUM PHOSPHATE, DIBASIC, AND POTASSIUM PHOSPHATE: .53; .5; .37; .037; .03; .012; .00082 INJECTION, SOLUTION INTRAVENOUS at 09:04

## 2019-04-26 NOTE — CONSULTS
[facility]  Endocrinology  Diabetes Consult Note    Consult Requested by: Yonatan Mcintosh MD   Reason for admit: Mitral stenosis    HISTORY OF PRESENT ILLNESS:  Reason for Consult: Management of Hyperglycemia     Surgical Procedure and Date: Mitral valve replacement 4/26/19     Lab Results   Component Value Date    HGBA1C 5.3 04/11/2019     HPI:   Patient is a 64 y.o. female with a diagnosis of mitral valve prolapse s/p mitral valve repair (May 2018) and now with severe mitral stenosis, as well as paroxysmal atrial fibrillation.  She is s/p mitral valve replacement on 4/26/19. Mother with DM per chart review. Endocrinology consulted for management of hyperglycemia.                Interval HPI:   Overnight events: Remains in SICU s/p mitral valve replacement today. BG well controlled on IV intensive insulin infusion.   Eating:   NPO  Nausea: No  Hypoglycemia and intervention: No  Fever: No  TPN and/or TF: No    PMH, PSH, FH, SH  reviewed     ROS:  Constitutional: Negative for weight changes.  Eyes: Negative for visual disturbance.  Respiratory: Negative for cough.   Cardiovascular: Negative for chest pain.  Gastrointestinal: Negative for nausea.  Endocrine: Negative for polyuria, polydipsia.  Musculoskeletal: Negative for back pain.  Skin: Negative for rash.  Neurological: Negative for syncope.  Psychiatric/Behavioral: Negative for depression.    Current Medications and/or Treatments Impacting Glycemic Control  Immunotherapy:    Immunosuppressants     None        Steroids:   Hormones (From admission, onward)    None        Pressors:    Autonomic Drugs (From admission, onward)    Start     Stop Route Frequency Ordered    04/26/19 1515  EPINEPHrine (ADRENALIN) 5 mg in sodium chloride 0.9% 250 mL infusion     Question Answer Comment   Titrate by: (in mcg/kg/min) 0.02    Titrate interval: (in minutes) 5    Titrate to maintain: (SBP or MAP or Cardiac Index) MAP    Greater than: (in mmHg) 65     Cardiac index greater than: (in L/min) 2.2    Maximum dose: (in mcg/kg/min) 2        -- IV Continuous 04/26/19 1417    04/26/19 1515  norepinephrine 4 mg in dextrose 5% 250 mL infusion (premix) (titrating)     Question Answer Comment   Titrate by: (in mcg/kg/min) 0.02    Titrate interval: (in minutes) 5    Titrate to maintain: (MAP or SBP) MAP    Greater than: (in mmHg) 60    Maximum dose: (in mcg/kg/min) 3        -- IV Continuous 04/26/19 1417        Hyperglycemia/Diabetes Medications:   Antihyperglycemics (From admission, onward)    Start     Stop Route Frequency Ordered    04/26/19 1515  insulin regular (Humulin R) 100 Units in sodium chloride 0.9% 100 mL infusion     Question:  Insulin rate changes (DO NOT MODIFY ANSWER)  Answer:  [email or web address]\CTS INSULIN [id number]    -- IV Continuous 04/26/19 1417             PHYSICAL EXAMINATION:  Vitals:    04/26/19 1749   BP:    Pulse: 75   Resp: 16   Temp:      Body mass index is 26.16 kg/m².    Physical Exam   Constitutional: Well developed, well nourished, NAD.  ENT: External ears no masses with nose patent; normal hearing.  Neck: Supple; trachea midline.  Cardiovascular: Normal heart sounds, no LE edema. DP +2 bilaterally. Midsternal chest incision with dressing.  Lungs: Normal effort; lungs anterior bilaterally clear to auscultation. Chest tube in place x2.  Abdomen: Soft, no masses, no hernias.  MS: No clubbing or cyanosis of nails noted; unable to assess gait.  Skin: No rashes, lesions, or ulcers; no nodules.   Psychiatric: Good judgement and insight; normal mood and affect.  Neurological: Cranial nerves are grossly intact.   Foot: Nails in good condition, no amputations noted.          Labs Reviewed and Include   Recent Labs   Lab 04/26/19  1417   *   CALCIUM 9.5      K 3.1*   CO2 21*      BUN 25*   CREATININE 0.9     Lab Results   Component Value Date    WBC 20.58 (H) 04/26/2019    HGB 11.4 (L) 04/26/2019     HCT 31 (L) 04/26/2019    MCV 95 04/26/2019    PLT 91 (L) 04/26/2019     No results for input(s): TSH, FREET4 in the last 168 hours.  Lab Results   Component Value Date    HGBA1C 5.3 04/11/2019       Nutritional status:   Body mass index is 26.16 kg/m².  Lab Results   Component Value Date    ALBUMIN 4.1 04/11/2019    ALBUMIN 3.7 04/08/2019     No results found for: PREALBUMIN    Estimated Creatinine Clearance: 68.9 mL/min (based on SCr of 0.9 mg/dL).    Accu-Checks  Recent Labs     04/26/19  1409 04/26/19  1525 04/26/19  1625   POCTGLUCOSE 126* 136* 143*        ASSESSMENT and PLAN    * Mitral stenosis  Managed per primary team  Avoid hypoglycemia        Hyperglycemia  BG goal 110-140    Continue IV insulin infusion protocol  Requires intensive BG monitoring while on protocol     Discharge planning: TBD        S/P mitral valve repair  Post-op day 1  Managed per primary team              Plan discussed with patient, family, and RN at bedside.     Nany Doan NP  Endocrinology  [facility]

## 2019-04-26 NOTE — ASSESSMENT & PLAN NOTE
BG goal 110-140    Continue IV insulin infusion protocol  Requires intensive BG monitoring while on protocol     Discharge planning: JOSÉ MANUEL

## 2019-04-26 NOTE — ASSESSMENT & PLAN NOTE
Ericka Lewis is a 64 y.o. female s/p redo Mitral Valve Repair on 4/26 with Dr. Horowitz.  She was brought up to SICU extubated and on epi, but quickly needed BIPAP.    Neuro:   - PO pain meds PRN    Pulmonary:   - BIPAP, recheck a gas, wean to NC then RA    Cardiac:   - Currently maintaining pressures on 0.04 of epi  - Received 500 ml albumin bolus after coming up    Renal:    - BUN/Cr normal preop    ID:   - Periop ancef    Hem/Onc:   -Stable H/H  -Finish TXA    Endocrine:    - Insulin drip    Fluids/Electrolytes/Nutrition/GI:   - Replace electrolytes PRN    PPx:  - DVT: Not started yet  - Bowel: PPI    DISPO:  - Continue SICU care.  Work on pulmonary and wean off pressors

## 2019-04-26 NOTE — OP NOTE
[facility]  Cardiothoracic Surgery Operative Report    Patient Name:  Ericak Lewis; [id number]    Preoperative Diagnosis: mitral stenosis s/p mitral valve repair, paroxysmal atrial fibrillation    Postoperative Diagnosis:  Same    Date of Operation:  04/26/2019    Operation:  Mitral valve replacement (anterior leaflet chordal sparing and posterior leaflet sparing technique) with 29 mm Medtronic Mosaic porcine bioprosthesis  - Reoperative cardiac surgery  - Left atrial Duran Maze IV procedure with Atricure cryoablation  - Left atrial appendage closure    Surgeon:  Yonatan Mcintosh MD    Assistant Surgeon:  none    Fellow:  none    Anesthesiologist:  Meño Faulkner MD    ---------------------------------------------------------------------------------------------------------------------      Indications for surgery: Ms. Lewis is a 64 year-old woman with a history of mitral valve prolapse s/p mitral valve repair (May 2018) and now with severe mitral stenosis, as well as paroxysmal atrial fibrillation.  Recently, she has been symptomatic with dyspnea on exertion, lower extremity edema, and orthopnea symptoms interfering with her quality of life.  Her most recent echocardiogram (4/8/2019) showed 55% ejection fraction, severe mitral stenosis with mean transvalvular gradient of 10mmHg, no mitral regurgitation, trace tricuspid regurgitation, and pulmonary hypertension with estimated systolic PA pressure of 42mmHg.  Angiogram showed non-obstructive coronary artery disease with a right dominant system.  CT chest noncontrast showed safe re-entry for reoperation.  We had an extensive discussion with her regarding the ACC/AHA guidelines and we agreed that she has class I indications for surgery involving reoperative sternotomy, mitral valve replacement (bioprosthesis), possible mitral valve repair, possible tricuspid valve repair, and maze procedure with left atrial appendage removal.  The risks and benefits were  explained and informed consent was obtained.     Gross findings: Severe pericardial adhesions. The mitral valve had minimal residual leaflet remaining and a small annuloplasty ring likely causing the stenosis.  Tricuspid valve with minimal regurgitation and non-dilated annulus so no repair performed.  Left atrial appendage closed from the left atrial side using running 4-0 prolene suture.      Procedure:  The patient was brought to the holding area and the indications for surgery were reviewed.  She was placed supine on the operating room table and prepped and draped in the usual sterile fashion. A surgical time out was performed.    A midline incision was made on the chest where the previous scar was noted and deepened with electrocautery to expose the sternum.  The prior sternal wires and bands (likely zip-fix) were removed.  The sternum was divided with an oscillating saw and hemostasis was obtained. A chest retractor was placed and we began to carefully expose the inferior surface of the heart then the inferior vena cava, right atrium, superior vena cava, and aorta, using electrocautery and sharp dissection.   ACT guided heparinization was administered and the ascending aorta, superior vena cava, and inferior vena cava were cannulated.  Cardiopulmonary bypass was commenced and the remaining heart was exposed.  The ascending aorta was cannulated for administration of cardioplegia and a retrograde cardioplegia catheter was placed in the coronary sinus.   The cross-clamp was applied and 1500cc of antegrade cold blood cardioplegia was administered.  Subsequent doses of 500cc of retrograde cardioplegia were administered every 20 minutes.      Attention was turned to the left atrium.  Due to the prior mitral surgery, Sondergaard's groove was heavily scarred down.  Thus, the left atrium was opened through a biatrial-transeptal approach via a right atriotomy.  The cruz-maze IV was now performed using the cryoablation  device on the left atrium.  The AtriCure cryo probe was placed to encircle the right sided pulmonary veins and was activated.  The cryoablation reached the target temperature and was performed for two minutes.  Next, the AtriCure probe encircled the left sided pulmonary veins and activated.  After reaching target temperature, the cryoablation was performed for two minutes. This was repeated for the connecting lesion between the pulmonary veins, then the mitral annulus/coronary sinus lesion, then the lesion to the left atrial appendage scar.  Afterwards, the left atrial appendage was located and found to be small and relatively flat.  Using two running 4-0 SH prolene sutures, the left atrial appendage was closed from the endocardial surface.  Dense adhesions on the epicardial surface of the heart prevented complete removal of the appendage.     Attention was turned to the mitral valve.  The mitral valve was noted to have minimal residual leaflet tissue remaining, making a successful re-repair very unlikely.  The annuloplasty ring was noted to be small in size.  The sutures on the annuloplasty ring were carefully removed with an eleven blade.  The ring was next removed with blunt and sharp dissection.  The anterior leaflet of the mitral valve was resected, sparing the chordal attachments which were then sutured to the 10 o'clock and 2 o'clock positions on the mitral annulus using pledgetted 2-0 braided non-absorbable sutures.  The annulus was sized to 29 mm and a 29 mm Medtronic Mosaic porcine bioprosthesis was selected and prepared.  Next, the posterior leaflet was spared and sutured to the mitral annulus using pledgetted 2-0 braided non-absorbable sutures.  Now, the remaining annular sutures were placed circumferentially around the mitral annulus and then through the sewing ring of the mitral bioprosthesis and tied.  Saline testing revealed perfect coaptation of the valve without evidence of paravalvular leak.. The  "left atrial roof and interatrial septum was closed with continuous 4-0 prolene suture, followed by closure of the right atriotomy.    A dose of warm "hot shot" cardioplegia was given retrograde.  Air was evacuated and the cross-clamp was removed. All anastomoses were checked for hemostasis and the patient was weaned from bypass on minimal inotropic support.  The total cardiopulmonary bypass time was 186 minutes and cross clamp time was 158 minutes.  The cannulae were removed and heparin was reversed.  Temporary ventricular pacing wires were placed on the heart.  Drainage tubes were placed behind the sternum and in the pleural spaces. The chest was closed with steel wires and the soft tissue was closed with absorbable suture.  A sterile dressing was applied and the patient was brought to the ICU in stable condition.      I was present in the operating room for the entire operation and immediately available thereafter.  "

## 2019-04-26 NOTE — PROGRESS NOTES
[facility]  Endocrinology  Progress Note    Admit Date: 4/21/2019     Reason for Consult: Management of Hyperglycemia     Surgical Procedure and Date: Mitral valve replacement 4/26/19     Lab Results   Component Value Date    HGBA1C 5.3 04/11/2019     HPI:   Patient is a 64 y.o. female with a diagnosis of mitral valve prolapse s/p mitral valve repair (May 2018) and now with severe mitral stenosis, as well as paroxysmal atrial fibrillation.  She is s/p mitral valve replacement on 4/26/19. Mother with DM per chart review. Endocrinology consulted for management of hyperglycemia.                Interval HPI:   Overnight events: Remains in SICU s/p mitral valve replacement today. BG well controlled on IV intensive insulin infusion.   Eating:   NPO  Nausea: No  Hypoglycemia and intervention: No  Fever: No  TPN and/or TF: No    PMH, PSH, FH, SH reviewed     ROS:  Constitutional: Negative for weight changes.  Eyes: Negative for visual disturbance.  Respiratory: Negative for cough.   Cardiovascular: Negative for chest pain.  Gastrointestinal: Negative for nausea.  Endocrine: Negative for polyuria, polydipsia.  Musculoskeletal: Negative for back pain.  Skin: Negative for rash.  Neurological: Negative for syncope.  Psychiatric/Behavioral: Negative for depression.    Current Medications and/or Treatments Impacting Glycemic Control  Immunotherapy:    Immunosuppressants     None        Steroids:   Hormones (From admission, onward)    None        Pressors:    Autonomic Drugs (From admission, onward)    Start     Stop Route Frequency Ordered    04/26/19 1515  EPINEPHrine (ADRENALIN) 5 mg in sodium chloride 0.9% 250 mL infusion     Question Answer Comment   Titrate by: (in mcg/kg/min) 0.02    Titrate interval: (in minutes) 5    Titrate to maintain: (SBP or MAP or Cardiac Index) MAP    Greater than: (in mmHg) 65    Cardiac index greater than: (in L/min) 2.2    Maximum dose: (in mcg/kg/min) 2        -- IV  Continuous 04/26/19 1417    04/26/19 1515  norepinephrine 4 mg in dextrose 5% 250 mL infusion (premix) (titrating)     Question Answer Comment   Titrate by: (in mcg/kg/min) 0.02    Titrate interval: (in minutes) 5    Titrate to maintain: (MAP or SBP) MAP    Greater than: (in mmHg) 60    Maximum dose: (in mcg/kg/min) 3        -- IV Continuous 04/26/19 1417        Hyperglycemia/Diabetes Medications:   Antihyperglycemics (From admission, onward)    Start     Stop Route Frequency Ordered    04/26/19 1515  insulin regular (Humulin R) 100 Units in sodium chloride 0.9% 100 mL infusion     Question:  Insulin rate changes (DO NOT MODIFY ANSWER)  Answer:  [email or web address]\[email or web address]\InsulinInfusions\CTS INSULIN [id number].pdf    -- IV Continuous 04/26/19 1417             PHYSICAL EXAMINATION:  Vitals:    04/26/19 1615   BP:    Pulse: 80   Resp: (!) 32   Temp:      Body mass index is 26.16 kg/m².    Physical Exam   Constitutional: Well developed, well nourished, NAD.  ENT: External ears no masses with nose patent; normal hearing.  Neck: Supple; trachea midline.  Cardiovascular: Normal heart sounds, no LE edema. DP +2 bilaterally. Midsternal chest incision with dressing.   Lungs: Normal effort; lungs anterior bilaterally clear to auscultation. Chest tube in place x2.   Abdomen: Soft, no masses, no hernias.  MS: No clubbing or cyanosis of nails noted; unable to assess gait.  Skin: No rashes, lesions, or ulcers; no nodules.   Psychiatric: Good judgement and insight; normal mood and affect.  Neurological: Cranial nerves are grossly intact.   Foot: Nails in good condition, no amputations noted.           ASSESSMENT and PLAN    * Mitral stenosis  Managed per primary team  Avoid hypoglycemia        Hyperglycemia  BG goal 110-140    Continue IV insulin infusion protocol  Requires intensive BG monitoring while on protocol     Discharge planning: TBD        S/P mitral valve repair  Post-op day 1  Managed per primary  team              Nany Doan, KRYSTAL  Endocrinology  [facility]

## 2019-04-26 NOTE — PLAN OF CARE
Problem: Adult Inpatient Plan of Care  Goal: Plan of Care Review  Outcome: Ongoing (interventions implemented as appropriate)  Patient remains free from falls and injuries through out shift. Patient AAO and VSS. Patient denies chest pain and SOB. Patient's family at bedside. Patient clip and prepped for surgery and Hibiclens bath completed. Patient NPO at midnight. Plan of care reviewed with patient. Patient verbalizes understanding of plan.  Will continue to monitor.

## 2019-04-26 NOTE — ANESTHESIA PROCEDURE NOTES
Wilton Kaelyn Line    Diagnosis: Mitral stenosis  Patient location during procedure: done in OR  Procedure start time: 4/26/2019 7:25 AM  Timeout: 4/26/2019 7:24 AM  Procedure end time: 4/26/2019 7:42 AM  Staffing  Anesthesiologist: Meño Faulkner MD  Resident/CRNA: Krystal Clark MD  Performed: resident/CRNA   Anesthesiologist was present at the time of the procedure.  Preanesthetic Checklist  Completed: patient identified, site marked, surgical consent, pre-op evaluation, timeout performed, IV checked, risks and benefits discussed, monitors and equipment checked and anesthesia consent given  [facility] Kaelyn Line  Skin Prep: chlorhexidine gluconate  Local Infiltration: none  Location: right,  internal jugular vein  Vessel Caliber: medium, patent, compressibility normal  Introducer: 9.5 Fr, manometry used.  Device: CCO/Oximetric Catheter  Catheter Size: 8 Fr  Catheter placement by yes. Heme positive aspiration all ports. PAC floated with balloon up not wedgedSterile sheath used  Locked at: 45 cm.Insertion Attempts: 1  Indication: intravenous therapy, hemodynamic monitoring  Ultrasound Guidance  Needle advanced into vessel with real time Ultrasound guidance.  Sterile sheath used.  Assessment  Central Line Bundle Protocol followed. Hand hygiene before procedure, surgical cap worn, surgical mask worn, sterile surgical gloves worn, large sterile drape used.  Dressing: sutured in place and taped and tegaderm  Patient: Tolerated Well

## 2019-04-26 NOTE — HPI
Ms. Lewis is a 64 year-old woman with a history of mitral valve prolapse s/p mitral valve repair (May 2018) and now with severe mitral stenosis, as well as paroxysmal atrial fibrillation.  Recently, she has been symptomatic with dyspnea on exertion, lower extremity edema, and orthopnea symptoms interfering with her quality of life.  Her most recent echocardiogram (4/8/2019) showed 55% ejection fraction, severe mitral stenosis with mean transvalvular gradient of 10mmHg, no mitral regurgitation, trace tricuspid regurgitation, and pulmonary hypertension with estimated systolic PA pressure of 42mmHg.  Angiogram showed non-obstructive coronary artery disease with a right dominant system.  CT chest noncontrast showed safe re-entry for reoperation.    Pt brought up to SICU extubated after mitral valve redo repair.

## 2019-04-26 NOTE — ANESTHESIA PROCEDURE NOTES
TAMAR    Diagnosis: mitral stenosis  Patient location during procedure: OR  Procedure start time: 4/26/2019 7:46 AM  Timeout: 4/26/2019 7:45 AM  Procedure end time: 4/26/2019 8:20 AM  Exam type: Baseline  Staffing  Anesthesiologist: Meño Faulkner MD  Performed: anesthesiologist   Preanesthetic Checklist  Completed: patient identified, surgical consent, pre-op evaluation, timeout performed, risks and benefits discussed, monitors and equipment checked, anesthesia consent given, oxygen available, suction available, hand hygiene performed and patient being monitored  Setup & Induction  Patient preparation: bite block inserted  Probe Insertion: easy  Exam: complete      Findings  Impression  Other Findings  Normal biventricular systolic function  S/p mitral valve repair with annuloplasty band  Mean gradient through MV 3mmHg  MVA 0.86cm2 using 3D planimetry  Probe Removal      Exam     Left Heart  Left Atruim: dilated    Left Ventricle: cm, normal (men 4.2-5.9; women 3.8-5.2)    LVAD  Estimated Ejection Fraction: > 55% normal  Regional Wall Abnormalities: no RWMA            Right Heart  Right Ventricle: dilated  Right Ventricle Function: normal    Intra Atrial Septum  PFO: no shunt by color flow doppler          Right Ventricle  Size: dilated    Aortic Valve:  Stenosis: none  Morphology: trileaflet  Regurgitation: no aortic valve regurgitation     Mitral Valve:  Morphology:annuloplaty ring present  Prolapse: none  Flail: no flail  Jet Description: none  Mitral Stenosis Mean Gradient: 3 mmhg    Tricuspid Valve:  Morphology: normal  Regurgitation: mild    Pulmonic Valve:  Morphology:normal  Regurgitation(color flow): none    Great Vessels  Ascending Aorta Atherosclerosis: 1=nl-min dz  Aortic Arch Atherosclerosis: 1=nl-min dz  Descending Aorta Atherosclerosis: 1=nl-min dz      Effusions  Effusions: none    Summary  Findings discussed with surgeon.    Other Findings   Normal biventricular systolic function  S/p mitral valve  repair with annuloplasty band  Mean gradient through MV 3mmHg  MVA 0.86cm2 using 3D planimetry

## 2019-04-26 NOTE — SUBJECTIVE & OBJECTIVE
Interval HPI:   Overnight events: Remains in SICU s/p mitral valve replacement today. BG well controlled on IV intensive insulin infusion.   Eating:   NPO  Nausea: No  Hypoglycemia and intervention: No  Fever: No  TPN and/or TF: No    PMH, PSH, FH, SH reviewed     ROS:  Constitutional: Negative for weight changes.  Eyes: Negative for visual disturbance.  Respiratory: Negative for cough.   Cardiovascular: Negative for chest pain.  Gastrointestinal: Negative for nausea.  Endocrine: Negative for polyuria, polydipsia.  Musculoskeletal: Negative for back pain.  Skin: Negative for rash.  Neurological: Negative for syncope.  Psychiatric/Behavioral: Negative for depression.    Current Medications and/or Treatments Impacting Glycemic Control  Immunotherapy:    Immunosuppressants     None        Steroids:   Hormones (From admission, onward)    None        Pressors:    Autonomic Drugs (From admission, onward)    Start     Stop Route Frequency Ordered    04/26/19 1515  EPINEPHrine (ADRENALIN) 5 mg in sodium chloride 0.9% 250 mL infusion     Question Answer Comment   Titrate by: (in mcg/kg/min) 0.02    Titrate interval: (in minutes) 5    Titrate to maintain: (SBP or MAP or Cardiac Index) MAP    Greater than: (in mmHg) 65    Cardiac index greater than: (in L/min) 2.2    Maximum dose: (in mcg/kg/min) 2        -- IV Continuous 04/26/19 1417    04/26/19 1515  norepinephrine 4 mg in dextrose 5% 250 mL infusion (premix) (titrating)     Question Answer Comment   Titrate by: (in mcg/kg/min) 0.02    Titrate interval: (in minutes) 5    Titrate to maintain: (MAP or SBP) MAP    Greater than: (in mmHg) 60    Maximum dose: (in mcg/kg/min) 3        -- IV Continuous 04/26/19 1417        Hyperglycemia/Diabetes Medications:   Antihyperglycemics (From admission, onward)    Start     Stop Route Frequency Ordered    04/26/19 1515  insulin regular (Humulin R) 100 Units in sodium chloride 0.9% 100 mL infusion     Question:  Insulin rate changes (DO  NOT MODIFY ANSWER)  Answer:  [email or web address]\CTS INSULIN [id number]    -- IV Continuous 04/26/19 1417             PHYSICAL EXAMINATION:  Vitals:    04/26/19 1615   BP:    Pulse: 80   Resp: (!) 32   Temp:      Body mass index is 26.16 kg/m².    Physical Exam   Constitutional: Well developed, well nourished, NAD.  ENT: External ears no masses with nose patent; normal hearing.  Neck: Supple; trachea midline.  Cardiovascular: Normal heart sounds, no LE edema. DP +2 bilaterally. Midsternal chest incision with dressing.   Lungs: Normal effort; lungs anterior bilaterally clear to auscultation. Chest tube in place x2.   Abdomen: Soft, no masses, no hernias.  MS: No clubbing or cyanosis of nails noted; unable to assess gait.  Skin: No rashes, lesions, or ulcers; no nodules.   Psychiatric: Good judgement and insight; normal mood and affect.  Neurological: Cranial nerves are grossly intact.   Foot: Nails in good condition, no amputations noted.

## 2019-04-26 NOTE — NURSING
Pt arrived from OR at 1400. Drowsy, shallow hypoventilation. Oral airway placed. ABG obtained. Results given to Dr. Mcintosh. Orders received for BIPAP. Pt did not tolerate due to nausea and heaving.  BIPAP removed, MDs notified.  Approximately 1530, pt more awake with improved respiratory status.  Placed on NC. Trending ABGs. PH, CO2 and lactics improving. Upon arrival, hypotensive. Levo and Epi infusing.  So far this shift, 750 of albumin administered.  Levo now off. Weaning Epi.  NSR, SVO2 80s, CI >2.2. CVP elevated, Dr. Cruz aware.  UO adequate. Pt tolerating water. Nausea resolved. Chest tube output WDL. Medicated PRN with Oxy and Dilaudid, as respiratory status tolerates. Family at bedside updated on plan of care. Call light in reach. Bed locked in low position.

## 2019-04-26 NOTE — NURSING TRANSFER
Nursing Transfer Note      4/26/2019     Transfer To: [facility]    Transfer via wheelchair    Transfer with cardiac monitoring    Transported by Anne Marie Ny, ABRAM and transport       Medicines sent: none    Chart send with patient: Yes    Notified: spouse    Patients belongings sent with son and daughter in law.

## 2019-04-26 NOTE — HPI
Reason for Consult: Management of Hyperglycemia     Surgical Procedure and Date: Mitral valve replacement 4/26/19     Lab Results   Component Value Date    HGBA1C 5.3 04/11/2019     HPI:   Patient is a 64 y.o. female with a diagnosis of mitral valve prolapse s/p mitral valve repair (May 2018) and now with severe mitral stenosis, as well as paroxysmal atrial fibrillation.  She is s/p mitral valve replacement on 4/26/19. Mother with DM per chart review. Endocrinology consulted for management of hyperglycemia.

## 2019-04-26 NOTE — SUBJECTIVE & OBJECTIVE
Interval HPI:   Overnight events: Remains in SICU s/p mitral valve replacement today. BG well controlled on IV intensive insulin infusion.   Eating:   NPO  Nausea: No  Hypoglycemia and intervention: No  Fever: No  TPN and/or TF: No    PMH, PSH, FH, SH  reviewed     ROS:  Constitutional: Negative for weight changes.  Eyes: Negative for visual disturbance.  Respiratory: Negative for cough.   Cardiovascular: Negative for chest pain.  Gastrointestinal: Negative for nausea.  Endocrine: Negative for polyuria, polydipsia.  Musculoskeletal: Negative for back pain.  Skin: Negative for rash.  Neurological: Negative for syncope.  Psychiatric/Behavioral: Negative for depression.    Current Medications and/or Treatments Impacting Glycemic Control  Immunotherapy:    Immunosuppressants     None        Steroids:   Hormones (From admission, onward)    None        Pressors:    Autonomic Drugs (From admission, onward)    Start     Stop Route Frequency Ordered    04/26/19 1515  EPINEPHrine (ADRENALIN) 5 mg in sodium chloride 0.9% 250 mL infusion     Question Answer Comment   Titrate by: (in mcg/kg/min) 0.02    Titrate interval: (in minutes) 5    Titrate to maintain: (SBP or MAP or Cardiac Index) MAP    Greater than: (in mmHg) 65    Cardiac index greater than: (in L/min) 2.2    Maximum dose: (in mcg/kg/min) 2        -- IV Continuous 04/26/19 1417    04/26/19 1515  norepinephrine 4 mg in dextrose 5% 250 mL infusion (premix) (titrating)     Question Answer Comment   Titrate by: (in mcg/kg/min) 0.02    Titrate interval: (in minutes) 5    Titrate to maintain: (MAP or SBP) MAP    Greater than: (in mmHg) 60    Maximum dose: (in mcg/kg/min) 3        -- IV Continuous 04/26/19 1417        Hyperglycemia/Diabetes Medications:   Antihyperglycemics (From admission, onward)    Start     Stop Route Frequency Ordered    04/26/19 1515  insulin regular (Humulin R) 100 Units in sodium chloride 0.9% 100 mL infusion     Question:  Insulin rate changes (DO  NOT MODIFY ANSWER)  Answer:  [email or web address]\Images\Pharmacy\InsulinInfusions\CTS INSULIN [id number].pdf    -- IV Continuous 04/26/19 1417             PHYSICAL EXAMINATION:  Vitals:    04/26/19 1749   BP:    Pulse: 75   Resp: 16   Temp:      Body mass index is 26.16 kg/m².    Physical Exam   Constitutional: Well developed, well nourished, NAD.  ENT: External ears no masses with nose patent; normal hearing.  Neck: Supple; trachea midline.  Cardiovascular: Normal heart sounds, no LE edema. DP +2 bilaterally. Midsternal chest incision with dressing.  Lungs: Normal effort; lungs anterior bilaterally clear to auscultation. Chest tube in place x2.  Abdomen: Soft, no masses, no hernias.  MS: No clubbing or cyanosis of nails noted; unable to assess gait.  Skin: No rashes, lesions, or ulcers; no nodules.   Psychiatric: Good judgement and insight; normal mood and affect.  Neurological: Cranial nerves are grossly intact.   Foot: Nails in good condition, no amputations noted.

## 2019-04-26 NOTE — H&P
[facility]  Critical Care - Surgery  History & Physical    Patient Name: Ericka Lewis  MRN: [id number]  Admission Date: 4/21/2019  Code Status: Prior  Attending Physician: Yonatan Mcintosh MD   Primary Care Provider: Primary Doctor No   Principal Problem: Mitral stenosis    Subjective:     HPI:  Ms. Lewis is a 64 year-old woman with a history of mitral valve prolapse s/p mitral valve repair (May 2018) and now with severe mitral stenosis, as well as paroxysmal atrial fibrillation.  Recently, she has been symptomatic with dyspnea on exertion, lower extremity edema, and orthopnea symptoms interfering with her quality of life.  Her most recent echocardiogram (4/8/2019) showed 55% ejection fraction, severe mitral stenosis with mean transvalvular gradient of 10mmHg, no mitral regurgitation, trace tricuspid regurgitation, and pulmonary hypertension with estimated systolic PA pressure of 42mmHg.  Angiogram showed non-obstructive coronary artery disease with a right dominant system.  CT chest noncontrast showed safe re-entry for reoperation.    Pt brought up to SICU extubated after mitral valve redo repair.        Hospital/ICU Course:  No notes on file    Follow-up For: Procedure(s) (LRB):  REPLACEMENT, MITRAL VALVE (N/A)  COMPLETE MAZE PROCEDURE USING CRYOBLATION (N/A)  \REPAIR , LEFT ATRIAL APPENDAGE,  (Left)    Post-Operative Day: Day of Surgery     Past Medical History:   Diagnosis Date    Heart murmur     Hyperlipidemia     Hypertension        Past Surgical History:   Procedure Laterality Date    BACK SURGERY      CARDIAC CATHETERIZATION      INSERTION, CATHETER, RIGHT HEART Right 4/25/2019    Performed by Chandrakant Castanon MD at [facility] CATH LAB       Review of patient's allergies indicates:   Allergen Reactions    Kiwi Anaphylaxis    Metronidazole Anaphylaxis, Hives and Other (See Comments)     BREATHING ISSUES AND HIVES         Family History     Problem Relation (Age of Onset)     Arrhythmia Father    Diabetes Mother    Heart disease Mother, Sister    Hypertension Mother, Father    Liver disease Sister    No Known Problems Brother, Maternal Aunt, Maternal Uncle, Paternal Aunt, Paternal Uncle, Maternal Grandmother, Maternal Grandfather, Paternal Grandmother, Paternal Grandfather    Stroke Mother        Tobacco Use    Smoking status: Former Smoker    Smokeless tobacco: Never Used   Substance and Sexual Activity    Alcohol use: Not on file     Comment: social    Drug use: Never    Sexual activity: Not on file      Review of Systems  Objective:     Vital Signs (Most Recent):  Temp: 98.7 °F (37.1 °C) (04/26/19 1600)  Pulse: 74 (04/26/19 1633)  Resp: 19 (04/26/19 1633)  BP: (!) 115/59 (04/26/19 1600)  SpO2: 100 % (04/26/19 1633) Vital Signs (24h Range):  Temp:  [97.5 °F (36.4 °C)-98.7 °F (37.1 °C)] 98.7 °F (37.1 °C)  Pulse:  [66-91] 74  Resp:  [13-32] 19  SpO2:  [95 %-100 %] 100 %  BP: ()/(52-75) 115/59  Arterial Line BP: ()/(51-68) 125/68     Weight: 75.8 kg (167 lb)  Body mass index is 26.16 kg/m².      Intake/Output Summary (Last 24 hours) at 4/26/2019 1700  Last data filed at 4/26/2019 1600  Gross per 24 hour   Intake 5050 ml   Output 1170 ml   Net 3880 ml       Physical Exam   Constitutional: She is oriented to person, place, and time. She appears well-developed and well-nourished.   HENT:   Head: Normocephalic and atraumatic.   Intubated   Eyes: Right eye exhibits no discharge. Left eye exhibits no discharge.   Neck: Normal range of motion. Neck supple.   Cardiovascular: Normal rate and regular rhythm.   Sternotomy, c/d/i.  4 CTs with SS output.  Cardiac Lead wires in place   Pulmonary/Chest: Effort normal. No respiratory distress.   Abdominal: Soft. She exhibits no distension.   Musculoskeletal: Normal range of motion.   Neurological: She is alert and oriented to person, place, and time.   Skin: Skin is warm and dry.   Psychiatric: She has a normal mood and affect. Her  behavior is normal.   Vitals reviewed.      Vents:       Lines/Drains/Airways     Central Venous Catheter Line                 Introducer 04/26/19 0725 less than 1 day         Percutaneous Central Line Insertion/Assessment - double lumen  04/26/19 0800 right internal jugular less than 1 day         Pulmonary Artery Catheter Assessment  04/26/19 0725 less than 1 day          Drain                 Urethral Catheter 04/26/19 0753 Temperature probe 16 Fr. less than 1 day         Y Chest Tube 1 and 2 04/26/19 1354 1 Anterior Mediastinal 19 Fr. Posterior Mediastinal 19 Fr. less than 1 day         Y Chest Tube 3 and 4 04/26/19 1355 Right Pleural 19 Fr. 4 Left Pleural 19 Fr. less than 1 day          Arterial Line                 Arterial Line 04/26/19 0712 Left Radial less than 1 day          Line                 Pacer Wires 04/26/19 1340 less than 1 day                Significant Labs:    CBC/Anemia Profile:  Recent Labs   Lab 04/26/19  0639  04/26/19  1238  04/26/19  1417 04/26/19  1527 04/26/19  1628   WBC 6.34  --  14.60*  --  20.58*  --   --    HGB 16.5*  --  8.1*  --  11.4*  --   --    HCT 49.4*   < > 24.3*   < > 35.2* 32* 32*     --  121*  --  91*  --   --    MCV 93  --  94  --  95  --   --    RDW 12.3  --  12.3  --  12.2  --   --     < > = values in this interval not displayed.        Chemistries:  Recent Labs   Lab 04/25/19  0312 04/26/19  0335 04/26/19  1417    132* 141   K 3.5 4.3 3.1*   CL 91* 93* 104   CO2 32* 22* 21*   BUN 30* 36* 25*   CREATININE 1.2 1.1 0.9   CALCIUM 9.9 9.6 9.5   MG 2.4 2.5 3.5*  3.5*   PHOS 4.9* 4.1 4.7*  4.7*       Significant Imaging: I have reviewed all pertinent imaging results/findings within the past 24 hours.    Assessment/Plan:     S/P mitral valve repair  Ericka Lewis is a 64 y.o. female s/p redo Mitral Valve Repair on 4/26 with Dr. Horowitz.  She was brought up to SICU extubated and on epi, but quickly needed BIPAP.    Neuro:   - PO pain meds PRN    Pulmonary:    - BIPAP, recheck a gas, wean to NC then RA    Cardiac:   - Currently maintaining pressures on 0.04 of epi  - Received 500 ml albumin bolus after coming up    Renal:    - BUN/Cr normal preop    ID:   - Periop ancef    Hem/Onc:   -Stable H/H  -Finish TXA    Endocrine:    - Insulin drip    Fluids/Electrolytes/Nutrition/GI:   - Replace electrolytes PRN    PPx:  - DVT: Not started yet  - Bowel: PPI    DISPO:  - Continue SICU care.  Work on pulmonary and wean off pressors         Critical care was time spent personally by me on the following activities: development of treatment plan with patient or surrogate and bedside caregivers, discussions with consultants, evaluation of patient's response to treatment, examination of patient, ordering and performing treatments and interventions, ordering and review of laboratory studies, ordering and review of radiographic studies, pulse oximetry, re-evaluation of patient's condition.  This critical care time did not overlap with that of any other provider or involve time for any procedures.     Kamran Shelby MD  Critical Care - Surgery  [facility]

## 2019-04-26 NOTE — ANESTHESIA PROCEDURE NOTES
Central Line    Diagnosis: Mitral stenosis  Patient location during procedure: done in OR  Procedure start time: 4/26/2019 7:25 AM  Timeout: 4/26/2019 7:24 AM  Procedure end time: 4/26/2019 7:42 AM  Staffing  Anesthesiologist: Meño Faulkner MD  Resident/CRNA: Krystal Clark MD  Performed: resident/CRNA   Anesthesiologist was present at the time of the procedure.  Preanesthetic Checklist  Completed: patient identified, site marked, surgical consent, pre-op evaluation, timeout performed, IV checked, risks and benefits discussed, monitors and equipment checked and anesthesia consent given  Indication   Indication: hemodynamic monitoring, vascular access, med administration     Anesthesia   general anesthesia    Central Line   Skin Prep: skin prepped with ChloraPrep, skin prep agent completely dried prior to procedure  maximum sterile barriers used during central venous catheter insertion  hand hygiene performed prior to central venous catheter insertion  Location: right, internal jugular.   Catheter type: introducer (Double lumen introducer)  Catheter Size: 9.5 Fr  Inserted Catheter Length: 16 cm  Ultrasound: vascular probe with ultrasound  Vessel Caliber: medium, patent  Needle advanced into vessel with real time Ultrasound guidance.   Manometry: Venous cannualation confirmed by visual estimation of blood vessel pressure using manometry.  Insertion Attempts: 1   Securement:line sutured, chlorhexidine patch, sterile dressing applied and blood return through all ports    Post-Procedure   Adverse Events:none    Guidewire Guidewire removed intact.

## 2019-04-26 NOTE — ANESTHESIA PROCEDURE NOTES
Arterial    Diagnosis: Mitral stenosis    Patient location during procedure: done in OR  Procedure start time: 4/26/2019 7:12 AM  Timeout: 4/26/2019 7:12 AM  Procedure end time: 4/26/2019 7:15 AM  Staffing  Anesthesiologist: Meño Faulkner MD  Resident/CRNA: Krystal Clark MD  Performed: resident/CRNA   Anesthesiologist was present at the time of the procedure.  Preanesthetic Checklist  Completed: patient identified, site marked, surgical consent, pre-op evaluation, timeout performed, IV checked, risks and benefits discussed, monitors and equipment checked and anesthesia consent givenArterial  Skin Prep: chlorhexidine gluconate  Local Infiltration: lidocaine  Orientation: left  Location: radial  Catheter Size: 20 G  Catheter placement by Ultrasound guidance. Heme positive aspiration all ports.  Vessel Caliber: patent  Needle advanced into vessel with real time Ultrasound guidance.Insertion Attempts: 2  Assessment  Dressing: secured with tape and tegaderm  Patient: Tolerated well

## 2019-04-26 NOTE — PLAN OF CARE
04/26/19 0924   Discharge Assessment   Assessment Type Discharge Planning Reassessment   Discharge Plan A Home with family   Discharge Plan B Home with family;Home Health   DME Needed Upon Discharge  other (see comments)  (TBD)   Patient/Family in Agreement with Plan yes     Pt to OR for redo-sterotomy with mitral valve replacement (bioprosthesis), possible mitral valve repair, possible tricuspid valve repair, and maze procedure with left atrial appendage removal on 4/26. Will transfer to the ICU. PT/OT to be consulted for help with dispo. Will continue to monitor and help with discharge planning throughout admission.

## 2019-04-26 NOTE — SUBJECTIVE & OBJECTIVE
Follow-up For: Procedure(s) (LRB):  REPLACEMENT, MITRAL VALVE (N/A)  COMPLETE MAZE PROCEDURE USING CRYOBLATION (N/A)  \REPAIR , LEFT ATRIAL APPENDAGE,  (Left)    Post-Operative Day: Day of Surgery     Past Medical History:   Diagnosis Date    Heart murmur     Hyperlipidemia     Hypertension        Past Surgical History:   Procedure Laterality Date    BACK SURGERY      CARDIAC CATHETERIZATION      INSERTION, CATHETER, RIGHT HEART Right 4/25/2019    Performed by Chandrakant Castanon MD at [facility] CATH LAB       Review of patient's allergies indicates:   Allergen Reactions    Kiwi Anaphylaxis    Metronidazole Anaphylaxis, Hives and Other (See Comments)     BREATHING ISSUES AND HIVES         Family History     Problem Relation (Age of Onset)    Arrhythmia Father    Diabetes Mother    Heart disease Mother, Sister    Hypertension Mother, Father    Liver disease Sister    No Known Problems Brother, Maternal Aunt, Maternal Uncle, Paternal Aunt, Paternal Uncle, Maternal Grandmother, Maternal Grandfather, Paternal Grandmother, Paternal Grandfather    Stroke Mother        Tobacco Use    Smoking status: Former Smoker    Smokeless tobacco: Never Used   Substance and Sexual Activity    Alcohol use: Not on file     Comment: social    Drug use: Never    Sexual activity: Not on file      Review of Systems  Objective:     Vital Signs (Most Recent):  Temp: 98.7 °F (37.1 °C) (04/26/19 1600)  Pulse: 74 (04/26/19 1633)  Resp: 19 (04/26/19 1633)  BP: (!) 115/59 (04/26/19 1600)  SpO2: 100 % (04/26/19 1633) Vital Signs (24h Range):  Temp:  [97.5 °F (36.4 °C)-98.7 °F (37.1 °C)] 98.7 °F (37.1 °C)  Pulse:  [66-91] 74  Resp:  [13-32] 19  SpO2:  [95 %-100 %] 100 %  BP: ()/(52-75) 115/59  Arterial Line BP: ()/(51-68) 125/68     Weight: 75.8 kg (167 lb)  Body mass index is 26.16 kg/m².      Intake/Output Summary (Last 24 hours) at 4/26/2019 1700  Last data filed at 4/26/2019 1600  Gross per 24 hour   Intake 5050 ml   Output  1170 ml   Net 3880 ml       Physical Exam   Constitutional: She is oriented to person, place, and time. She appears well-developed and well-nourished.   HENT:   Head: Normocephalic and atraumatic.   Intubated   Eyes: Right eye exhibits no discharge. Left eye exhibits no discharge.   Neck: Normal range of motion. Neck supple.   Cardiovascular: Normal rate and regular rhythm.   Sternotomy, c/d/i.  4 CTs with SS output.  Cardiac Lead wires in place   Pulmonary/Chest: Effort normal. No respiratory distress.   Abdominal: Soft. She exhibits no distension.   Musculoskeletal: Normal range of motion.   Neurological: She is alert and oriented to person, place, and time.   Skin: Skin is warm and dry.   Psychiatric: She has a normal mood and affect. Her behavior is normal.   Vitals reviewed.      Vents:       Lines/Drains/Airways     Central Venous Catheter Line                 Introducer 04/26/19 0725 less than 1 day         Percutaneous Central Line Insertion/Assessment - double lumen  04/26/19 0800 right internal jugular less than 1 day         Pulmonary Artery Catheter Assessment  04/26/19 0725 less than 1 day          Drain                 Urethral Catheter 04/26/19 0753 Temperature probe 16 Fr. less than 1 day         Y Chest Tube 1 and 2 04/26/19 1354 1 Anterior Mediastinal 19 Fr. Posterior Mediastinal 19 Fr. less than 1 day         Y Chest Tube 3 and 4 04/26/19 1355 Right Pleural 19 Fr. 4 Left Pleural 19 Fr. less than 1 day          Arterial Line                 Arterial Line 04/26/19 0712 Left Radial less than 1 day          Line                 Pacer Wires 04/26/19 1340 less than 1 day                Significant Labs:    CBC/Anemia Profile:  Recent Labs   Lab 04/26/19  0639  04/26/19  1238  04/26/19  1417 04/26/19  1527 04/26/19  1628   WBC 6.34  --  14.60*  --  20.58*  --   --    HGB 16.5*  --  8.1*  --  11.4*  --   --    HCT 49.4*   < > 24.3*   < > 35.2* 32* 32*     --  121*  --  91*  --   --    MCV 93  --   94  --  95  --   --    RDW 12.3  --  12.3  --  12.2  --   --     < > = values in this interval not displayed.        Chemistries:  Recent Labs   Lab 04/25/19  0312 04/26/19  0335 04/26/19  1417    132* 141   K 3.5 4.3 3.1*   CL 91* 93* 104   CO2 32* 22* 21*   BUN 30* 36* 25*   CREATININE 1.2 1.1 0.9   CALCIUM 9.9 9.6 9.5   MG 2.4 2.5 3.5*  3.5*   PHOS 4.9* 4.1 4.7*  4.7*       Significant Imaging: I have reviewed all pertinent imaging results/findings within the past 24 hours.

## 2019-04-27 LAB
ALLENS TEST: ABNORMAL
ALLENS TEST: NORMAL
ANION GAP SERPL CALC-SCNC: 10 MMOL/L (ref 8–16)
APTT BLDCRRT: 26.5 SEC (ref 21–32)
BASOPHILS # BLD AUTO: 0.01 K/UL (ref 0–0.2)
BASOPHILS NFR BLD: 0.1 % (ref 0–1.9)
BUN SERPL-MCNC: 22 MG/DL (ref 8–23)
CALCIUM SERPL-MCNC: 8.9 MG/DL (ref 8.7–10.5)
CHLORIDE SERPL-SCNC: 103 MMOL/L (ref 95–110)
CO2 SERPL-SCNC: 24 MMOL/L (ref 23–29)
CREAT SERPL-MCNC: 0.8 MG/DL (ref 0.5–1.4)
DELSYS: ABNORMAL
DELSYS: NORMAL
DIFFERENTIAL METHOD: ABNORMAL
EOSINOPHIL # BLD AUTO: 0 K/UL (ref 0–0.5)
EOSINOPHIL NFR BLD: 0.3 % (ref 0–8)
ERYTHROCYTE [DISTWIDTH] IN BLOOD BY AUTOMATED COUNT: 12.5 % (ref 11.5–14.5)
ERYTHROCYTE [SEDIMENTATION RATE] IN BLOOD BY WESTERGREN METHOD: 15 MM/H
ERYTHROCYTE [SEDIMENTATION RATE] IN BLOOD BY WESTERGREN METHOD: 15 MM/H
ERYTHROCYTE [SEDIMENTATION RATE] IN BLOOD BY WESTERGREN METHOD: 18 MM/H
EST. GFR  (AFRICAN AMERICAN): >60 ML/MIN/1.73 M^2
EST. GFR  (NON AFRICAN AMERICAN): >60 ML/MIN/1.73 M^2
FIO2: 28
FLOW: 2
FLOW: 4
FLOW: 4
GLUCOSE SERPL-MCNC: 109 MG/DL (ref 70–110)
HCO3 UR-SCNC: 26.7 MMOL/L (ref 24–28)
HCO3 UR-SCNC: 27.4 MMOL/L (ref 24–28)
HCO3 UR-SCNC: 27.5 MMOL/L (ref 24–28)
HCO3 UR-SCNC: 28.7 MMOL/L (ref 24–28)
HCO3 UR-SCNC: 29.4 MMOL/L (ref 24–28)
HCO3 UR-SCNC: 30.9 MMOL/L (ref 24–28)
HCT VFR BLD AUTO: 30.2 % (ref 37–48.5)
HCT VFR BLD CALC: 26 %PCV (ref 36–54)
HCT VFR BLD CALC: 28 %PCV (ref 36–54)
HCT VFR BLD CALC: 29 %PCV (ref 36–54)
HGB BLD-MCNC: 9.8 G/DL (ref 12–16)
IMM GRANULOCYTES # BLD AUTO: 0.03 K/UL (ref 0–0.04)
IMM GRANULOCYTES NFR BLD AUTO: 0.3 % (ref 0–0.5)
INR PPP: 1 (ref 0.8–1.2)
LDH SERPL L TO P-CCNC: 0.99 MMOL/L (ref 0.36–1.25)
LDH SERPL L TO P-CCNC: 1.08 MMOL/L (ref 0.36–1.25)
LDH SERPL L TO P-CCNC: 2.19 MMOL/L (ref 0.36–1.25)
LYMPHOCYTES # BLD AUTO: 0.6 K/UL (ref 1–4.8)
LYMPHOCYTES NFR BLD: 6.4 % (ref 18–48)
MAGNESIUM SERPL-MCNC: 2.3 MG/DL (ref 1.6–2.6)
MCH RBC QN AUTO: 30.7 PG (ref 27–31)
MCHC RBC AUTO-ENTMCNC: 32.5 G/DL (ref 32–36)
MCV RBC AUTO: 95 FL (ref 82–98)
MODE: ABNORMAL
MODE: NORMAL
MONOCYTES # BLD AUTO: 0.9 K/UL (ref 0.3–1)
MONOCYTES NFR BLD: 8.6 % (ref 4–15)
NEUTROPHILS # BLD AUTO: 8.5 K/UL (ref 1.8–7.7)
NEUTROPHILS NFR BLD: 84.3 % (ref 38–73)
NRBC BLD-RTO: 0 /100 WBC
PCO2 BLDA: 44.2 MMHG (ref 35–45)
PCO2 BLDA: 46 MMHG (ref 35–45)
PCO2 BLDA: 46.2 MMHG (ref 35–45)
PCO2 BLDA: 49.5 MMHG (ref 35–45)
PCO2 BLDA: 49.9 MMHG (ref 35–45)
PCO2 BLDA: 54.8 MMHG (ref 35–45)
PH SMN: 7.34 [PH] (ref 7.35–7.45)
PH SMN: 7.35 [PH] (ref 7.35–7.45)
PH SMN: 7.37 [PH] (ref 7.35–7.45)
PH SMN: 7.38 [PH] (ref 7.35–7.45)
PH SMN: 7.39 [PH] (ref 7.35–7.45)
PH SMN: 7.43 [PH] (ref 7.35–7.45)
PHOSPHATE SERPL-MCNC: 3.6 MG/DL (ref 2.7–4.5)
PLATELET # BLD AUTO: 85 K/UL (ref 150–350)
PMV BLD AUTO: 11.1 FL (ref 9.2–12.9)
PO2 BLDA: 33 MMHG (ref 40–60)
PO2 BLDA: 77 MMHG (ref 80–100)
PO2 BLDA: 80 MMHG (ref 80–100)
PO2 BLDA: 81 MMHG (ref 80–100)
PO2 BLDA: 89 MMHG (ref 80–100)
PO2 BLDA: 93 MMHG (ref 80–100)
POC BE: 2 MMOL/L
POC BE: 3 MMOL/L
POC BE: 4 MMOL/L
POC BE: 7 MMOL/L
POC IONIZED CALCIUM: 1.18 MMOL/L (ref 1.06–1.42)
POC IONIZED CALCIUM: 1.2 MMOL/L (ref 1.06–1.42)
POC IONIZED CALCIUM: 1.22 MMOL/L (ref 1.06–1.42)
POC SATURATED O2: 59 % (ref 95–100)
POC SATURATED O2: 95 % (ref 95–100)
POC SATURATED O2: 97 % (ref 95–100)
POC SATURATED O2: 97 % (ref 95–100)
POC TCO2: 28 MMOL/L (ref 23–27)
POC TCO2: 29 MMOL/L (ref 23–27)
POC TCO2: 29 MMOL/L (ref 23–27)
POC TCO2: 30 MMOL/L (ref 23–27)
POC TCO2: 31 MMOL/L (ref 24–29)
POC TCO2: 32 MMOL/L (ref 23–27)
POCT GLUCOSE: 100 MG/DL (ref 70–110)
POCT GLUCOSE: 101 MG/DL (ref 70–110)
POCT GLUCOSE: 101 MG/DL (ref 70–110)
POCT GLUCOSE: 105 MG/DL (ref 70–110)
POCT GLUCOSE: 110 MG/DL (ref 70–110)
POCT GLUCOSE: 116 MG/DL (ref 70–110)
POCT GLUCOSE: 118 MG/DL (ref 70–110)
POCT GLUCOSE: 120 MG/DL (ref 70–110)
POCT GLUCOSE: 128 MG/DL (ref 70–110)
POCT GLUCOSE: 134 MG/DL (ref 70–110)
POCT GLUCOSE: 136 MG/DL (ref 70–110)
POCT GLUCOSE: 137 MG/DL (ref 70–110)
POCT GLUCOSE: 139 MG/DL (ref 70–110)
POCT GLUCOSE: 93 MG/DL (ref 70–110)
POCT GLUCOSE: 96 MG/DL (ref 70–110)
POCT GLUCOSE: 99 MG/DL (ref 70–110)
POTASSIUM BLD-SCNC: 3.4 MMOL/L (ref 3.5–5.1)
POTASSIUM BLD-SCNC: 3.5 MMOL/L (ref 3.5–5.1)
POTASSIUM BLD-SCNC: 4.2 MMOL/L (ref 3.5–5.1)
POTASSIUM SERPL-SCNC: 3.3 MMOL/L (ref 3.5–5.1)
POTASSIUM SERPL-SCNC: 3.7 MMOL/L (ref 3.5–5.1)
POTASSIUM SERPL-SCNC: 4.2 MMOL/L (ref 3.5–5.1)
PROTHROMBIN TIME: 10.4 SEC (ref 9–12.5)
RBC # BLD AUTO: 3.19 M/UL (ref 4–5.4)
SAMPLE: ABNORMAL
SAMPLE: NORMAL
SITE: ABNORMAL
SITE: NORMAL
SODIUM BLD-SCNC: 139 MMOL/L (ref 136–145)
SODIUM SERPL-SCNC: 137 MMOL/L (ref 136–145)
SP02: 91
SP02: 95
SP02: 95
SP02: 97
WBC # BLD AUTO: 10.07 K/UL (ref 3.9–12.7)

## 2019-04-27 PROCEDURE — 84132 ASSAY OF SERUM POTASSIUM: CPT

## 2019-04-27 PROCEDURE — 85014 HEMATOCRIT: CPT

## 2019-04-27 PROCEDURE — 63600175 PHARM REV CODE 636 W HCPCS: Performed by: STUDENT IN AN ORGANIZED HEALTH CARE EDUCATION/TRAINING PROGRAM

## 2019-04-27 PROCEDURE — C9113 INJ PANTOPRAZOLE SODIUM, VIA: HCPCS | Performed by: STUDENT IN AN ORGANIZED HEALTH CARE EDUCATION/TRAINING PROGRAM

## 2019-04-27 PROCEDURE — 84100 ASSAY OF PHOSPHORUS: CPT

## 2019-04-27 PROCEDURE — 85025 COMPLETE CBC W/AUTO DIFF WBC: CPT

## 2019-04-27 PROCEDURE — 94761 N-INVAS EAR/PLS OXIMETRY MLT: CPT

## 2019-04-27 PROCEDURE — 25000003 PHARM REV CODE 250: Performed by: THORACIC SURGERY (CARDIOTHORACIC VASCULAR SURGERY)

## 2019-04-27 PROCEDURE — 25000003 PHARM REV CODE 250: Performed by: NURSE PRACTITIONER

## 2019-04-27 PROCEDURE — 85730 THROMBOPLASTIN TIME PARTIAL: CPT

## 2019-04-27 PROCEDURE — 83735 ASSAY OF MAGNESIUM: CPT

## 2019-04-27 PROCEDURE — 25000003 PHARM REV CODE 250: Performed by: STUDENT IN AN ORGANIZED HEALTH CARE EDUCATION/TRAINING PROGRAM

## 2019-04-27 PROCEDURE — 63600175 PHARM REV CODE 636 W HCPCS: Performed by: NURSE PRACTITIONER

## 2019-04-27 PROCEDURE — 84295 ASSAY OF SERUM SODIUM: CPT

## 2019-04-27 PROCEDURE — 99233 PR SUBSEQUENT HOSPITAL CARE,LEVL III: ICD-10-PCS | Mod: ,,, | Performed by: NURSE PRACTITIONER

## 2019-04-27 PROCEDURE — 99231 PR SUBSEQUENT HOSPITAL CARE,LEVL I: ICD-10-PCS | Mod: GC,,, | Performed by: SURGERY

## 2019-04-27 PROCEDURE — 82330 ASSAY OF CALCIUM: CPT

## 2019-04-27 PROCEDURE — 99233 SBSQ HOSP IP/OBS HIGH 50: CPT | Mod: ,,, | Performed by: NURSE PRACTITIONER

## 2019-04-27 PROCEDURE — 94640 AIRWAY INHALATION TREATMENT: CPT

## 2019-04-27 PROCEDURE — 63600175 PHARM REV CODE 636 W HCPCS: Performed by: THORACIC SURGERY (CARDIOTHORACIC VASCULAR SURGERY)

## 2019-04-27 PROCEDURE — 83605 ASSAY OF LACTIC ACID: CPT

## 2019-04-27 PROCEDURE — 99900035 HC TECH TIME PER 15 MIN (STAT)

## 2019-04-27 PROCEDURE — 84132 ASSAY OF SERUM POTASSIUM: CPT | Mod: 91

## 2019-04-27 PROCEDURE — 37799 UNLISTED PX VASCULAR SURGERY: CPT

## 2019-04-27 PROCEDURE — 80048 BASIC METABOLIC PNL TOTAL CA: CPT

## 2019-04-27 PROCEDURE — 99231 SBSQ HOSP IP/OBS SF/LOW 25: CPT | Mod: GC,,, | Performed by: SURGERY

## 2019-04-27 PROCEDURE — 82803 BLOOD GASES ANY COMBINATION: CPT

## 2019-04-27 PROCEDURE — 85610 PROTHROMBIN TIME: CPT

## 2019-04-27 PROCEDURE — 27000221 HC OXYGEN, UP TO 24 HOURS

## 2019-04-27 PROCEDURE — 20000000 HC ICU ROOM

## 2019-04-27 PROCEDURE — 25000242 PHARM REV CODE 250 ALT 637 W/ HCPCS: Performed by: STUDENT IN AN ORGANIZED HEALTH CARE EDUCATION/TRAINING PROGRAM

## 2019-04-27 RX ORDER — ONDANSETRON 2 MG/ML
4 INJECTION INTRAMUSCULAR; INTRAVENOUS ONCE
Status: COMPLETED | OUTPATIENT
Start: 2019-04-27 | End: 2019-04-27

## 2019-04-27 RX ORDER — INSULIN ASPART 100 [IU]/ML
0-5 INJECTION, SOLUTION INTRAVENOUS; SUBCUTANEOUS
Status: DISCONTINUED | OUTPATIENT
Start: 2019-04-27 | End: 2019-04-30

## 2019-04-27 RX ORDER — IBUPROFEN 200 MG
16 TABLET ORAL
Status: DISCONTINUED | OUTPATIENT
Start: 2019-04-27 | End: 2019-04-30

## 2019-04-27 RX ORDER — OXYCODONE HYDROCHLORIDE 5 MG/1
10 TABLET ORAL
Status: DISCONTINUED | OUTPATIENT
Start: 2019-04-27 | End: 2019-05-02

## 2019-04-27 RX ORDER — ACETAMINOPHEN 325 MG/1
650 TABLET ORAL EVERY 6 HOURS
Status: DISCONTINUED | OUTPATIENT
Start: 2019-04-27 | End: 2019-05-02 | Stop reason: HOSPADM

## 2019-04-27 RX ORDER — WARFARIN 1 MG/1
2 TABLET ORAL DAILY
Status: DISCONTINUED | OUTPATIENT
Start: 2019-04-27 | End: 2019-05-01

## 2019-04-27 RX ORDER — AMIODARONE HYDROCHLORIDE 200 MG/1
400 TABLET ORAL 2 TIMES DAILY
Status: DISCONTINUED | OUTPATIENT
Start: 2019-04-27 | End: 2019-04-28

## 2019-04-27 RX ORDER — HYDROMORPHONE HYDROCHLORIDE 1 MG/ML
1 INJECTION, SOLUTION INTRAMUSCULAR; INTRAVENOUS; SUBCUTANEOUS
Status: DISCONTINUED | OUTPATIENT
Start: 2019-04-27 | End: 2019-05-01

## 2019-04-27 RX ORDER — GLUCAGON 1 MG
1 KIT INJECTION
Status: DISCONTINUED | OUTPATIENT
Start: 2019-04-27 | End: 2019-04-30

## 2019-04-27 RX ORDER — ONDANSETRON 2 MG/ML
4 INJECTION INTRAMUSCULAR; INTRAVENOUS EVERY 6 HOURS PRN
Status: DISCONTINUED | OUTPATIENT
Start: 2019-04-27 | End: 2019-05-02

## 2019-04-27 RX ORDER — OXYCODONE HYDROCHLORIDE 5 MG/1
5 TABLET ORAL
Status: DISCONTINUED | OUTPATIENT
Start: 2019-04-27 | End: 2019-05-02 | Stop reason: HOSPADM

## 2019-04-27 RX ORDER — ACETAMINOPHEN 325 MG/1
650 TABLET ORAL EVERY 6 HOURS
Status: DISCONTINUED | OUTPATIENT
Start: 2019-04-27 | End: 2019-04-27

## 2019-04-27 RX ORDER — FUROSEMIDE 10 MG/ML
20 INJECTION INTRAMUSCULAR; INTRAVENOUS 2 TIMES DAILY
Status: DISCONTINUED | OUTPATIENT
Start: 2019-04-27 | End: 2019-04-28

## 2019-04-27 RX ORDER — IBUPROFEN 200 MG
24 TABLET ORAL
Status: DISCONTINUED | OUTPATIENT
Start: 2019-04-27 | End: 2019-04-30

## 2019-04-27 RX ORDER — LIDOCAINE 50 MG/G
1 PATCH TOPICAL
Status: DISCONTINUED | OUTPATIENT
Start: 2019-04-27 | End: 2019-05-02 | Stop reason: HOSPADM

## 2019-04-27 RX ORDER — METOPROLOL TARTRATE 25 MG/1
12.5 TABLET ORAL 2 TIMES DAILY
Status: DISCONTINUED | OUTPATIENT
Start: 2019-04-27 | End: 2019-04-27

## 2019-04-27 RX ADMIN — ACETAMINOPHEN 650 MG: 325 TABLET ORAL at 05:04

## 2019-04-27 RX ADMIN — HYDROMORPHONE HYDROCHLORIDE 1 MG: 1 INJECTION, SOLUTION INTRAMUSCULAR; INTRAVENOUS; SUBCUTANEOUS at 05:04

## 2019-04-27 RX ADMIN — ONDANSETRON 4 MG: 2 INJECTION INTRAMUSCULAR; INTRAVENOUS at 08:04

## 2019-04-27 RX ADMIN — PANTOPRAZOLE SODIUM 40 MG: 40 INJECTION, POWDER, LYOPHILIZED, FOR SOLUTION INTRAVENOUS at 09:04

## 2019-04-27 RX ADMIN — OXYCODONE HYDROCHLORIDE 10 MG: 10 TABLET ORAL at 07:04

## 2019-04-27 RX ADMIN — ASPIRIN 325 MG ORAL TABLET 325 MG: 325 PILL ORAL at 09:04

## 2019-04-27 RX ADMIN — ACETAMINOPHEN 650 MG: 325 TABLET ORAL at 11:04

## 2019-04-27 RX ADMIN — LIDOCAINE 1 PATCH: 50 PATCH TOPICAL at 09:04

## 2019-04-27 RX ADMIN — ONDANSETRON 4 MG: 2 INJECTION INTRAMUSCULAR; INTRAVENOUS at 03:04

## 2019-04-27 RX ADMIN — MUPIROCIN 1 G: 20 OINTMENT TOPICAL at 08:04

## 2019-04-27 RX ADMIN — HYDROMORPHONE HYDROCHLORIDE 1 MG: 1 INJECTION, SOLUTION INTRAMUSCULAR; INTRAVENOUS; SUBCUTANEOUS at 10:04

## 2019-04-27 RX ADMIN — POLYETHYLENE GLYCOL 3350 17 G: 17 POWDER, FOR SOLUTION ORAL at 09:04

## 2019-04-27 RX ADMIN — IPRATROPIUM BROMIDE AND ALBUTEROL SULFATE 3 ML: .5; 3 SOLUTION RESPIRATORY (INHALATION) at 03:04

## 2019-04-27 RX ADMIN — AMIODARONE HYDROCHLORIDE 400 MG: 200 TABLET ORAL at 08:04

## 2019-04-27 RX ADMIN — FUROSEMIDE 20 MG: 10 INJECTION, SOLUTION INTRAMUSCULAR; INTRAVENOUS at 01:04

## 2019-04-27 RX ADMIN — ONDANSETRON 4 MG: 2 INJECTION INTRAMUSCULAR; INTRAVENOUS at 07:04

## 2019-04-27 RX ADMIN — SODIUM CHLORIDE 1 UNITS/HR: 9 INJECTION, SOLUTION INTRAVENOUS at 08:04

## 2019-04-27 RX ADMIN — AMIODARONE HYDROCHLORIDE 400 MG: 200 TABLET ORAL at 09:04

## 2019-04-27 RX ADMIN — METOPROLOL TARTRATE 12.5 MG: 25 TABLET, FILM COATED ORAL at 09:04

## 2019-04-27 RX ADMIN — IPRATROPIUM BROMIDE AND ALBUTEROL SULFATE 3 ML: .5; 3 SOLUTION RESPIRATORY (INHALATION) at 12:04

## 2019-04-27 RX ADMIN — IPRATROPIUM BROMIDE AND ALBUTEROL SULFATE 3 ML: .5; 3 SOLUTION RESPIRATORY (INHALATION) at 08:04

## 2019-04-27 RX ADMIN — DOCUSATE SODIUM 100 MG: 100 CAPSULE, LIQUID FILLED ORAL at 08:04

## 2019-04-27 RX ADMIN — OXYCODONE HYDROCHLORIDE 10 MG: 10 TABLET ORAL at 01:04

## 2019-04-27 RX ADMIN — OXYCODONE HYDROCHLORIDE 5 MG: 5 TABLET ORAL at 06:04

## 2019-04-27 RX ADMIN — CEFAZOLIN 2 G: 1 INJECTION, POWDER, FOR SOLUTION INTRAMUSCULAR; INTRAVENOUS at 04:04

## 2019-04-27 RX ADMIN — OXYCODONE HYDROCHLORIDE 10 MG: 10 TABLET ORAL at 09:04

## 2019-04-27 RX ADMIN — HYDROMORPHONE HYDROCHLORIDE 1 MG: 1 INJECTION, SOLUTION INTRAMUSCULAR; INTRAVENOUS; SUBCUTANEOUS at 01:04

## 2019-04-27 RX ADMIN — CEFAZOLIN 2 G: 1 INJECTION, POWDER, FOR SOLUTION INTRAMUSCULAR; INTRAVENOUS at 07:04

## 2019-04-27 RX ADMIN — CEFAZOLIN 2 G: 1 INJECTION, POWDER, FOR SOLUTION INTRAMUSCULAR; INTRAVENOUS at 12:04

## 2019-04-27 RX ADMIN — OXYCODONE HYDROCHLORIDE 10 MG: 10 TABLET ORAL at 03:04

## 2019-04-27 RX ADMIN — MUPIROCIN 1 G: 20 OINTMENT TOPICAL at 09:04

## 2019-04-27 RX ADMIN — ACETAMINOPHEN 650 MG: 325 TABLET ORAL at 09:04

## 2019-04-27 RX ADMIN — WARFARIN SODIUM 2 MG: 2 TABLET ORAL at 05:04

## 2019-04-27 RX ADMIN — POTASSIUM CHLORIDE 40 MEQ: 400 INJECTION, SOLUTION INTRAVENOUS at 07:04

## 2019-04-27 RX ADMIN — DOCUSATE SODIUM 100 MG: 100 CAPSULE, LIQUID FILLED ORAL at 09:04

## 2019-04-27 RX ADMIN — HYDROMORPHONE HYDROCHLORIDE 1 MG: 1 INJECTION, SOLUTION INTRAMUSCULAR; INTRAVENOUS; SUBCUTANEOUS at 07:04

## 2019-04-27 RX ADMIN — HYDROMORPHONE HYDROCHLORIDE 1 MG: 1 INJECTION, SOLUTION INTRAMUSCULAR; INTRAVENOUS; SUBCUTANEOUS at 03:04

## 2019-04-27 NOTE — ASSESSMENT & PLAN NOTE
BG goal 110-140    Plan:  Discontinue IV intensive insulin infusion  Start transition IV insulin infusion at 1.2 units/hr with stepdown parameters  Low dose correction scale  BG monitoring AC/HS/0200    Discharge planning: TBD

## 2019-04-27 NOTE — SUBJECTIVE & OBJECTIVE
Interval History:   1 Day Post-Op  NAEON.   +/- 0.01 Epi after IV pain Rx  Chest tube output downtrending     ROS  Medications:  Continuous Infusions:   epinephrine infusion 0.01 mcg/kg/min (04/27/19 1500)    insulin (HUMAN R) infusion (adults) 1.2 Units/hr (04/27/19 1500)     Scheduled Meds:   acetaminophen  650 mg Oral Q6H    amiodarone  400 mg Oral BID    aspirin  325 mg Per NG tube Once    aspirin  325 mg Oral Daily    ceFAZolin (ANCEF) IVPB  2 g Intravenous Q8H    docusate sodium  100 mg Oral BID    furosemide  20 mg Intravenous BID    lidocaine  1 patch Transdermal Q24H    mupirocin  1 g Nasal BID    pantoprazole  40 mg Intravenous Daily    polyethylene glycol  17 g Oral Daily    potassium, sodium phosphates  1 packet Oral Once    warfarin  2 mg Oral Daily     PRN Meds:albumin human 5%, aspirin, bisacodyl, dextrose 50%, dextrose 50%, glucagon (human recombinant), glucose, glucose, HYDROmorphone, insulin aspart U-100, k phos di & mono-sod phos mono, magnesium sulfate IVPB, magnesium sulfate IVPB, metoclopramide HCl, ondansetron, oxyCODONE, oxyCODONE, potassium chloride in water **AND** potassium chloride in water **AND** potassium chloride in water, sodium chloride 0.9%, sodium phosphate IVPB, sodium phosphate IVPB, sodium phosphate IVPB     Objective:     Vital Signs (Most Recent):  Temp: 97.9 °F (36.6 °C) (04/27/19 1500)  Pulse: 61 (04/27/19 1537)  Resp: (!) 30 (04/27/19 1537)  BP: (!) 96/52 (04/27/19 1500)  SpO2: 95 % (04/27/19 1537) Vital Signs (24h Range):  Temp:  [97.9 °F (36.6 °C)-99.7 °F (37.6 °C)] 97.9 °F (36.6 °C)  Pulse:  [51-80] 61  Resp:  [9-34] 30  SpO2:  [90 %-100 %] 95 %  BP: ()/(51-66) 96/52  Arterial Line BP: ()/(40-68) 101/53     Weight: 81.3 kg (179 lb 3.7 oz)  Body mass index is 28.07 kg/m².    SpO2: 95 %  O2 Device (Oxygen Therapy): nasal cannula    Intake/Output - Last 3 Shifts       04/25 0700 - 04/26 0659 04/26 0700 - 04/27 0659 04/27 0700 - 04/28 0659    P.O.   800 240    I.V. (mL/kg)  4533 (55.8)     Blood  850     IV Piggyback  100     Total Intake(mL/kg)  6283 (77.3) 240 (3)    Urine (mL/kg/hr)  1704 (0.9) 640 (0.9)    Chest Tube  510 200    Total Output  2214 840    Net  +4069 -600                 Lines/Drains/Airways     Central Venous Catheter Line                 Introducer 04/26/19 0725 1 day         Percutaneous Central Line Insertion/Assessment - double lumen  04/26/19 0800 right internal jugular 1 day         Pulmonary Artery Catheter Assessment  04/26/19 0725 1 day          Drain                 Urethral Catheter 04/26/19 0753 Temperature probe 16 Fr. 1 day         Y Chest Tube 1 and 2 04/26/19 1354 1 Anterior Mediastinal 19 Fr. Posterior Mediastinal 19 Fr. 1 day         Y Chest Tube 3 and 4 04/26/19 1355 Right Pleural 19 Fr. 4 Left Pleural 19 Fr. 1 day          Arterial Line                 Arterial Line 04/26/19 0712 Left Radial 1 day          Line                 Pacer Wires 04/26/19 1340 1 day          Peripheral Intravenous Line                 Peripheral IV - Single Lumen 04/27/19 1348 22 G Left Hand less than 1 day                Physical Exam  A&O, NAD  RRR  No Resp distress 2L NC  Chest with dressing c/d/i  Chest tubes with SS output  Ext warm    Significant Labs:  CBC:   Recent Labs   Lab 04/27/19 0304  04/27/19  0822   WBC 10.07  --   --    RBC 3.19*  --   --    HGB 9.8*  --   --    HCT 30.2*   < > 29*   PLT 85*  --   --    MCV 95  --   --    MCH 30.7  --   --    MCHC 32.5  --   --     < > = values in this interval not displayed.     CMP:   Recent Labs   Lab 04/27/19  0304 04/27/19  0800     --    CALCIUM 8.9  --      --    K 4.2 3.7   CO2 24  --      --    BUN 22  --    CREATININE 0.8  --      Coagulation:   Recent Labs   Lab 04/27/19 0304   INR 1.0   APTT 26.5     All pertinent labs from the last 24 hours have been reviewed.    Significant Diagnostics:  CXR: I have reviewed all pertinent results/findings within the past 24  hours

## 2019-04-27 NOTE — PROGRESS NOTES
"[facility]  Endocrinology  Progress Note    Admit Date: 4/21/2019     Reason for Consult: Management of Hyperglycemia     Surgical Procedure and Date: Mitral valve replacement 4/26/19     Lab Results   Component Value Date    HGBA1C 5.3 04/11/2019     HPI:   Patient is a 64 y.o. female with a diagnosis of mitral valve prolapse s/p mitral valve repair (May 2018) and now with severe mitral stenosis, as well as paroxysmal atrial fibrillation.  She is s/p mitral valve replacement on 4/26/19. Mother with DM per chart review. Endocrinology consulted for management of hyperglycemia.                Interval HPI:   Overnight events: Remains in SICU. BG at or slightly below goal on IV intensive insulin infusion. Rate decreased to 1.4 units/hr this morning.   Eating:   <25% (clear liquids)  Nausea: Yes  Hypoglycemia and intervention: No  Fever: No  TPN and/or TF: No    /62 (BP Location: Right arm, Patient Position: Lying)   Pulse 66   Temp 98.8 °F (37.1 °C) (Core ([place]))   Resp 19   Ht 5' 7" (1.702 m)   Wt 81.3 kg (179 lb 3.7 oz)   SpO2 97%   Breastfeeding? No   BMI 28.07 kg/m²      Labs Reviewed and Include    Recent Labs   Lab 04/27/19  0304      CALCIUM 8.9      K 4.2   CO2 24      BUN 22   CREATININE 0.8     Lab Results   Component Value Date    WBC 10.07 04/27/2019    HGB 9.8 (L) 04/27/2019    HCT 26 (L) 04/27/2019    MCV 95 04/27/2019    PLT 85 (L) 04/27/2019     No results for input(s): TSH, FREET4 in the last 168 hours.  Lab Results   Component Value Date    HGBA1C 5.3 04/11/2019       Nutritional status:   Body mass index is 28.07 kg/m².  Lab Results   Component Value Date    ALBUMIN 4.1 04/11/2019    ALBUMIN 3.7 04/08/2019     No results found for: PREALBUMIN    Estimated Creatinine Clearance: 80 mL/min (based on SCr of 0.8 mg/dL).    Accu-Checks  Recent Labs     04/26/19  2102 04/26/19  2159 04/26/19  2301 04/27/19  0009 04/27/19  0105 04/27/19  0202 " 04/27/19  0303 04/27/19  0414 04/27/19  0501 04/27/19  0601   POCTGLUCOSE 143* 134* 128* 120* 101 116* 110 105 101 96       Current Medications and/or Treatments Impacting Glycemic Control  Immunotherapy:    Immunosuppressants     None        Steroids:   Hormones (From admission, onward)    None        Pressors:    Autonomic Drugs (From admission, onward)    Start     Stop Route Frequency Ordered    04/26/19 1515  EPINEPHrine (ADRENALIN) 5 mg in sodium chloride 0.9% 250 mL infusion     Question Answer Comment   Titrate by: (in mcg/kg/min) 0.02    Titrate interval: (in minutes) 5    Titrate to maintain: (SBP or MAP or Cardiac Index) MAP    Greater than: (in mmHg) 65    Cardiac index greater than: (in L/min) 2.2    Maximum dose: (in mcg/kg/min) 2        -- IV Continuous 04/26/19 1417    04/26/19 1515  norepinephrine 4 mg in dextrose 5% 250 mL infusion (premix) (titrating)     Question Answer Comment   Titrate by: (in mcg/kg/min) 0.02    Titrate interval: (in minutes) 5    Titrate to maintain: (MAP or SBP) MAP    Greater than: (in mmHg) 60    Maximum dose: (in mcg/kg/min) 3        -- IV Continuous 04/26/19 1417        Hyperglycemia/Diabetes Medications:   Antihyperglycemics (From admission, onward)    Start     Stop Route Frequency Ordered    04/26/19 1515  insulin regular (Humulin R) 100 Units in sodium chloride 0.9% 100 mL infusion     Question:  Insulin rate changes (DO NOT MODIFY ANSWER)  Answer:  [email or web address] [id number]    -- IV Continuous 04/26/19 1417          ASSESSMENT and PLAN    * Mitral stenosis  Managed per primary team  Avoid hypoglycemia        Hyperglycemia  BG goal 110-140    Plan:  Discontinue IV intensive insulin infusion  Start transition IV insulin infusion at 1.2 units/hr with stepdown parameters  Low dose correction scale  BG monitoring AC/HS/0200    Discharge planning: TBD        S/P mitral valve repair  Post-op day 1  Managed per primary  team              Nany Doan, KRYSTAL  Endocrinology  [facility]

## 2019-04-27 NOTE — ANESTHESIA POSTPROCEDURE EVALUATION
Anesthesia Post Evaluation    Patient: Ericka Lewis    Procedure(s) Performed: Procedure(s) (LRB):  REPLACEMENT, MITRAL VALVE (N/A)  COMPLETE MAZE PROCEDURE USING CRYOBLATION (N/A)  \REPAIR , LEFT ATRIAL APPENDAGE,  (Left)    Final Anesthesia Type: general  Patient location during evaluation: ICU  Patient participation: Yes- Able to Participate  Level of consciousness: awake and alert  Post-procedure vital signs: reviewed and stable  Pain management: pain needs to be addressed  Airway patency: patent  PONV status at discharge: No PONV  Anesthetic complications: no      Cardiovascular status: hemodynamically stable  Respiratory status: nasal cannula  Follow-up not needed.          Vitals Value Taken Time   BP 96/52 4/27/2019  3:01 PM   Temp 36.7 °C (98.1 °F) 4/27/2019 11:00 AM   Pulse 60 4/27/2019  3:03 PM   Resp 41 4/27/2019  3:03 PM   SpO2 93 % 4/27/2019  3:03 PM   Vitals shown include unvalidated device data.      No case tracking events are documented in the log.      Pain/Yusra Score: Pain Rating Prior to Med Admin: 9 (4/27/2019  1:17 PM)  Pain Rating Post Med Admin: 3 (4/27/2019  1:47 PM)  Yusra Score: 6 (4/26/2019  2:00 PM)

## 2019-04-27 NOTE — ASSESSMENT & PLAN NOTE
Ericka Lewis is a 64 y.o. female s/p redo Mitral Valve Repair on 4/26 with Dr. Horowitz.  She was brought up to SICU extubated and on epi, but quickly needed BIPAP.    Neuro:   - PO pain meds PRN    Pulmonary:   - 3 L NC, wean as tolerated, pulm toilet    Cardiac:   - Currently maintaining pressures on 0.01 of epi, continuing to wean  - Received 500 ml albumin bolus after coming up    Renal:    - BUN/Cr normal preop    ID:   - Periop ancef    Hem/Onc:   -Stable H/H  -Protamine yesterday    Endocrine:    - Insulin drip    Fluids/Electrolytes/Nutrition/GI:   - Replace electrolytes PRN    PPx:  - DVT: Not started yet  - Bowel: PPI    DISPO:  - Continue SICU care.  Work on pulmonary and wean off pressors

## 2019-04-27 NOTE — SUBJECTIVE & OBJECTIVE
"Interval HPI:   Overnight events: Remains in SICU. BG at or slightly below goal on IV intensive insulin infusion. Rate decreased to 1.4 units/hr this morning.   Eating:   <25% (clear liquids)  Nausea: Yes  Hypoglycemia and intervention: No  Fever: No  TPN and/or TF: No    /62 (BP Location: Right arm, Patient Position: Lying)   Pulse 66   Temp 98.8 °F (37.1 °C) (Core ([place]))   Resp 19   Ht 5' 7" (1.702 m)   Wt 81.3 kg (179 lb 3.7 oz)   SpO2 97%   Breastfeeding? No   BMI 28.07 kg/m²     Labs Reviewed and Include    Recent Labs   Lab 04/27/19  0304      CALCIUM 8.9      K 4.2   CO2 24      BUN 22   CREATININE 0.8     Lab Results   Component Value Date    WBC 10.07 04/27/2019    HGB 9.8 (L) 04/27/2019    HCT 26 (L) 04/27/2019    MCV 95 04/27/2019    PLT 85 (L) 04/27/2019     No results for input(s): TSH, FREET4 in the last 168 hours.  Lab Results   Component Value Date    HGBA1C 5.3 04/11/2019       Nutritional status:   Body mass index is 28.07 kg/m².  Lab Results   Component Value Date    ALBUMIN 4.1 04/11/2019    ALBUMIN 3.7 04/08/2019     No results found for: PREALBUMIN    Estimated Creatinine Clearance: 80 mL/min (based on SCr of 0.8 mg/dL).    Accu-Checks  Recent Labs     04/26/19  2102 04/26/19  2159 04/26/19  2301 04/27/19  0009 04/27/19  0105 04/27/19  0202 04/27/19  0303 04/27/19  0414 04/27/19  0501 04/27/19  0601   POCTGLUCOSE 143* 134* 128* 120* 101 116* 110 105 101 96       Current Medications and/or Treatments Impacting Glycemic Control  Immunotherapy:    Immunosuppressants     None        Steroids:   Hormones (From admission, onward)    None        Pressors:    Autonomic Drugs (From admission, onward)    Start     Stop Route Frequency Ordered    04/26/19 1515  EPINEPHrine (ADRENALIN) 5 mg in sodium chloride 0.9% 250 mL infusion     Question Answer Comment   Titrate by: (in mcg/kg/min) 0.02    Titrate interval: (in minutes) 5    Titrate to maintain: (SBP or MAP or " Cardiac Index) MAP    Greater than: (in mmHg) 65    Cardiac index greater than: (in L/min) 2.2    Maximum dose: (in mcg/kg/min) 2        -- IV Continuous 04/26/19 1417    04/26/19 1515  norepinephrine 4 mg in dextrose 5% 250 mL infusion (premix) (titrating)     Question Answer Comment   Titrate by: (in mcg/kg/min) 0.02    Titrate interval: (in minutes) 5    Titrate to maintain: (MAP or SBP) MAP    Greater than: (in mmHg) 60    Maximum dose: (in mcg/kg/min) 3        -- IV Continuous 04/26/19 1417        Hyperglycemia/Diabetes Medications:   Antihyperglycemics (From admission, onward)    Start     Stop Route Frequency Ordered    04/26/19 1515  insulin regular (Humulin R) 100 Units in sodium chloride 0.9% 100 mL infusion     Question:  Insulin rate changes (DO NOT MODIFY ANSWER)  Answer:  [email or web address]\[email or web address]\CTS [email or web address] [id number]    -- IV Continuous 04/26/19 1417

## 2019-04-27 NOTE — PLAN OF CARE
Problem: Adult Inpatient Plan of Care  Goal: Plan of Care Review  Outcome: Ongoing (interventions implemented as appropriate)  Plan of care reviewed with patient and patient's spouse.  Remains on 2L nasal cannula, 02 sats 94%.  OOB to chair x 5hrs today, tolerated well.  Insulin gtt continued, epi gtt weaned to off. UOP adequate, IV lasix given this afternoon with noted increase in UOP following administration.  Pain controlled with scheduled Acetaminophen and PRN oxycodone and dilaudid.  CT's with moderate serosanguinous output.  PA catheter d/c'd.  VS currently stable, see flowsheet for full assessment.

## 2019-04-27 NOTE — ASSESSMENT & PLAN NOTE
Ericka Lewis is a 64 y.o. female s/p redo Mitral Valve Repair on 4/26 with Dr. Horowitz.  She was brought up to SICU extubated and on epi, but quickly needed BIPAP.     Neuro:   - PO pain meds PRN  - adjusted dosing this morning      Pulmonary:   - 3 L NC, wean as tolerated, pulm toilet  - IS  - Keep Chest tubes     Cardiac:   - Wean epi off as tolerated  - d/c swan  - Coumadin 2  - Amio 400 BID  - Holding PM dose of Metop 2/2 bradycardia     Renal:    - BUN/Cr normal preop  - Start Lasix 20 BID     ID:   - Periop ancef     Hem/Onc:   -Stable H/H  -Protamine given 4/26 for rebound heparin action     Endocrine:    - Insulin drip     Fluids/Electrolytes/Nutrition/GI:   - Replace electrolytes PRN  - ADAT to cardiac      PPx:  - DVT: SCDs  - Bowel: PPI     DISPO:  - Continue SICU care.  Work on pulmonary status and wean off pressors

## 2019-04-27 NOTE — PLAN OF CARE
Problem: Adult Inpatient Plan of Care  Goal: Patient-Specific Goal (Individualization)  PMH: MVR 2018, Afib, EF 55%, HTN, HLD, home Milrinone, Mitral Valve Stenosis     4/26: MVR Porcine Bioprosthesis, MAZE, left atrial appendage closure. 750 Albumin given  4/27: levo off    Nursing:  Accu Checks Q1hr  MAPS >60  SBP <130     Outcome: Ongoing (interventions implemented as appropriate)  Pt remains AAO x 4. CEBALLOS. Afebrile.

## 2019-04-27 NOTE — PLAN OF CARE
[facility]    HOME HEALTH ORDERS  FACE TO FACE ENCOUNTER    Patient Name: Ericka Lewis  YOB: 1955    PCP: Primary Doctor No   PCP Address: None  PCP Phone Number: None  PCP Fax: None    Encounter Date: 04/27/2019    Admit to Home Health    Diagnoses:  Active Hospital Problems    Diagnosis  POA    *Mitral stenosis [I05.0]  Yes    Hyperglycemia [R73.9]  Unknown    S/P mitral valve repair [Z98.890]  Not Applicable      Resolved Hospital Problems   No resolved problems to display.       No future appointments.        I have seen and examined this patient face to face today. My clinical findings that support the need for the home health skilled services and home bound status are the following:  Weakness/numbness causing balance and gait disturbance due to Surgery making it taxing to leave home.  Requiring assistive device to leave home due to unsteady gait caused by  Surgery.    Allergies:  Review of patient's allergies indicates:   Allergen Reactions    Kiwi Anaphylaxis    Metronidazole Anaphylaxis, Hives and Other (See Comments)     BREATHING ISSUES AND HIVES         Diet: regular diet    Activities: activity as tolerated    Nursing:   SN to complete comprehensive assessment including routine vital signs. Instruct on disease process and s/s of complications to report to MD. Review/verify medication list sent home with the patient at time of discharge  and instruct patient/caregiver as needed. Frequency may be adjusted depending on start of care date.    Notify MD if SBP > 160 or < 90; DBP > 90 or < 50; HR > 120 or < 50; Temp > 101      CONSULTS:    Physical Therapy to evaluate and treat. Evaluate for home safety and equipment needs; Establish/upgrade home exercise program. Perform / instruct on therapeutic exercises, gait training, transfer training, and Range of Motion.  Occupational Therapy to evaluate and treat. Evaluate home environment for safety and equipment needs.  Perform/Instruct on transfers, ADL training, ROM, and therapeutic exercises.  Aide to provide assistance with personal care, ADLs, and vital signs.    MISCELLANEOUS CARE:  N/A    WOUND CARE ORDERS  n/a      Medications: Review discharge medications with patient and family and provide education.      Equipment: Will need rolling walker with bilateral platform attachments.    Current Discharge Medication List      CONTINUE these medications which have NOT CHANGED    Details   metoprolol succinate (TOPROL-XL) 25 MG 24 hr tablet Take 25 mg by mouth once daily.  Refills: 1      aspirin (ECOTRIN) 81 MG EC tablet Take 81 mg by mouth once daily.       cholecalciferol, vitamin D3, (VITAMIN D3) 2,000 unit Cap Take 2,000 Units by mouth once daily.       diclofenac sodium (VOLTAREN) 1 % Gel Apply 2 g topically once daily.       estradiol (ESTRACE) 1 MG tablet Take 1 mg by mouth once daily.   Refills: 3      fluticasone (FLONASE) 50 mcg/actuation nasal spray SHAKE LQ AND U 1 SPR IEN D  Refills: 0      furosemide (LASIX) 40 MG tablet Take 40 mg by mouth 2 (two) times daily.  Refills: 8      lidocaine (XYLOCAINE) 5 % Oint ointment REGINALDO EXT AA D  Refills: 0      meloxicam (MOBIC) 15 MG tablet TK 1 T PO D  Refills: 2      multivitamin (THERAGRAN) per tablet Take 1 tablet by mouth once daily.       omeprazole (PRILOSEC) 40 MG capsule Take 40 mg by mouth every morning.   Refills: 1      oxyCODONE-acetaminophen (PERCOCET)  mg per tablet Take 1 tablet by mouth as needed.   Refills: 0      potassium chloride SA (K-DUR,KLOR-CON) 20 MEQ tablet Take 20 mEq by mouth once daily.   Refills: 6      promethazine (PHENERGAN) 25 MG tablet Take 25 mg by mouth every 6 (six) hours as needed.      tiZANidine (ZANAFLEX) 4 MG tablet TK 1 T PO QID PRF SPASM  Refills: 6             I certify that this patient is confined to her home and needs intermittent skilled nursing care, physical therapy and occupational therapy.

## 2019-04-27 NOTE — PROGRESS NOTES
[facility]  Cardiothoracic Surgery  Progress Note    Patient Name: Ericka Lewis  MRN: [id number]  Admission Date: 4/21/2019  Hospital Length of Stay: 6 days  Code Status: Prior   Attending Physician: Yonatan Mcintosh MD   Referring Provider: Yontaan Mcintosh MD  Principal Problem:Mitral stenosis            Subjective:     Post-Op Info:  Procedure(s) (LRB):  REPLACEMENT, MITRAL VALVE (N/A)  COMPLETE MAZE PROCEDURE USING CRYOBLATION (N/A)  \REPAIR , LEFT ATRIAL APPENDAGE,  (Left)   1 Day Post-Op     Interval History:   1 Day Post-Op  NAEON.   +/- 0.01 Epi after IV pain Rx  Chest tube output downtrending     ROS  Medications:  Continuous Infusions:   epinephrine infusion 0.01 mcg/kg/min (04/27/19 1500)    insulin (HUMAN R) infusion (adults) 1.2 Units/hr (04/27/19 1500)     Scheduled Meds:   acetaminophen  650 mg Oral Q6H    amiodarone  400 mg Oral BID    aspirin  325 mg Per NG tube Once    aspirin  325 mg Oral Daily    ceFAZolin (ANCEF) IVPB  2 g Intravenous Q8H    docusate sodium  100 mg Oral BID    furosemide  20 mg Intravenous BID    lidocaine  1 patch Transdermal Q24H    mupirocin  1 g Nasal BID    pantoprazole  40 mg Intravenous Daily    polyethylene glycol  17 g Oral Daily    potassium, sodium phosphates  1 packet Oral Once    warfarin  2 mg Oral Daily     PRN Meds:albumin human 5%, aspirin, bisacodyl, dextrose 50%, dextrose 50%, glucagon (human recombinant), glucose, glucose, HYDROmorphone, insulin aspart U-100, k phos di & mono-sod phos mono, magnesium sulfate IVPB, magnesium sulfate IVPB, metoclopramide HCl, ondansetron, oxyCODONE, oxyCODONE, potassium chloride in water **AND** potassium chloride in water **AND** potassium chloride in water, sodium chloride 0.9%, sodium phosphate IVPB, sodium phosphate IVPB, sodium phosphate IVPB     Objective:     Vital Signs (Most Recent):  Temp: 97.9 °F (36.6 °C) (04/27/19 1500)  Pulse: 61 (04/27/19 1537)  Resp: (!) 30 (04/27/19  1537)  BP: (!) 96/52 (04/27/19 1500)  SpO2: 95 % (04/27/19 1537) Vital Signs (24h Range):  Temp:  [97.9 °F (36.6 °C)-99.7 °F (37.6 °C)] 97.9 °F (36.6 °C)  Pulse:  [51-80] 61  Resp:  [9-34] 30  SpO2:  [90 %-100 %] 95 %  BP: ()/(51-66) 96/52  Arterial Line BP: ()/(40-68) 101/53     Weight: 81.3 kg (179 lb 3.7 oz)  Body mass index is 28.07 kg/m².    SpO2: 95 %  O2 Device (Oxygen Therapy): nasal cannula    Intake/Output - Last 3 Shifts       04/25 0700 - 04/26 0659 04/26 0700 - 04/27 0659 04/27 0700 - 04/28 0659    P.O.  800 240    I.V. (mL/kg)  4533 (55.8)     Blood  850     IV Piggyback  100     Total Intake(mL/kg)  6283 (77.3) 240 (3)    Urine (mL/kg/hr)  1704 (0.9) 640 (0.9)    Chest Tube  510 200    Total Output  2214 840    Net  +4069 -600                 Lines/Drains/Airways     Central Venous Catheter Line                 Introducer 04/26/19 0725 1 day         Percutaneous Central Line Insertion/Assessment - double lumen  04/26/19 0800 right internal jugular 1 day         Pulmonary Artery Catheter Assessment  04/26/19 0725 1 day          Drain                 Urethral Catheter 04/26/19 0753 Temperature probe 16 Fr. 1 day         Y Chest Tube 1 and 2 04/26/19 1354 1 Anterior Mediastinal 19 Fr. Posterior Mediastinal 19 Fr. 1 day         Y Chest Tube 3 and 4 04/26/19 1355 Right Pleural 19 Fr. 4 Left Pleural 19 Fr. 1 day          Arterial Line                 Arterial Line 04/26/19 0712 Left Radial 1 day          Line                 Pacer Wires 04/26/19 1340 1 day          Peripheral Intravenous Line                 Peripheral IV - Single Lumen 04/27/19 1348 22 G Left Hand less than 1 day                Physical Exam  A&O, NAD  RRR  No Resp distress 2L NC  Chest with dressing c/d/i  Chest tubes with SS output  Ext warm    Significant Labs:  CBC:   Recent Labs   Lab 04/27/19  0304  04/27/19  0822   WBC 10.07  --   --    RBC 3.19*  --   --    HGB 9.8*  --   --    HCT 30.2*   < > 29*   PLT 85*  --   --     MCV 95  --   --    MCH 30.7  --   --    MCHC 32.5  --   --     < > = values in this interval not displayed.     CMP:   Recent Labs   Lab 04/27/19  0304 04/27/19  0800     --    CALCIUM 8.9  --      --    K 4.2 3.7   CO2 24  --      --    BUN 22  --    CREATININE 0.8  --      Coagulation:   Recent Labs   Lab 04/27/19  0304   INR 1.0   APTT 26.5     All pertinent labs from the last 24 hours have been reviewed.    Significant Diagnostics:  CXR: I have reviewed all pertinent results/findings within the past 24 hours    Assessment/Plan:     * Mitral stenosis  Ericka Lewis is a 64 y.o. female s/p redo Mitral Valve Repair on 4/26 with Dr. Horowitz.  She was brought up to SICU extubated and on epi, but quickly needed BIPAP.     Neuro:   - PO pain meds PRN  - adjusted dosing this morning      Pulmonary:   - 3 L NC, wean as tolerated, pulm toilet  - IS  - Keep Chest tubes     Cardiac:   - Wean epi off as tolerated  - d/c swan  - Coumadin 2  - Amio 400 BID  - Holding PM dose of Metop 2/2 bradycardia     Renal:    - BUN/Cr normal preop  - Start Lasix 20 BID     ID:   - Periop ancef     Hem/Onc:   -Stable H/H  -Protamine given 4/26 for rebound heparin action     Endocrine:    - Insulin drip     Fluids/Electrolytes/Nutrition/GI:   - Replace electrolytes PRN  - ADAT to cardiac      PPx:  - DVT: SCDs  - Bowel: PPI     DISPO:  - Continue SICU care.  Work on pulmonary status and wean off pressors              Reji Cruz MD  Cardiothoracic Surgery  [facility]

## 2019-04-27 NOTE — NURSING
Dr. Cruz notified of arterial line MAP 57-61, NIBP MAP 61, since last dose of IV dilaudid. No new orders at this time, will continue to monitor.

## 2019-04-27 NOTE — NURSING
Dr. Cruz notified of UOP 15 cc last hour and 25 cc this hour.  CVP 12 flat.  MAP 60-65, HR 60s.  MD to place orders.  Will continue to monitor.

## 2019-04-27 NOTE — NURSING
Clarified orders of Protamine with Spindel via telephone.  VSS. H&H 11, 35. INR 1.3. PTT 45. CT output never >90 in one hour. TXA still infusing.  Orders received to administer anyway for elevated PTT.

## 2019-04-27 NOTE — PROGRESS NOTES
[facility]  Critical Care - Surgery  Progress Note    Patient Name: Ericka Lewis  MRN: [id number]  Admission Date: 4/21/2019  Hospital Length of Stay: 6 days  Code Status: Prior  Attending Provider: Yonatan Mcintosh MD  Primary Care Provider: Primary Doctor No   Principal Problem: Mitral stenosis    Subjective:     Hospital/ICU Course:  No notes on file    Interval History/Significant Events: Pain and nausea this AM, otherwise doing well.    Follow-up For: Procedure(s) (LRB):  REPLACEMENT, MITRAL VALVE (N/A)  COMPLETE MAZE PROCEDURE USING CRYOBLATION (N/A)  \REPAIR , LEFT ATRIAL APPENDAGE,  (Left)    Post-Operative Day: 1 Day Post-Op    Objective:     Vital Signs (Most Recent):  Temp: 98.8 °F (37.1 °C) (04/27/19 0300)  Pulse: 66 (04/27/19 0645)  Resp: 19 (04/27/19 0645)  BP: 114/62 (04/27/19 0600)  SpO2: 97 % (04/27/19 0645) Vital Signs (24h Range):  Temp:  [97.5 °F (36.4 °C)-99.7 °F (37.6 °C)] 98.8 °F (37.1 °C)  Pulse:  [60-80] 66  Resp:  [9-34] 19  SpO2:  [92 %-100 %] 97 %  BP: ()/(52-67) 114/62  Arterial Line BP: ()/(46-68) 99/47     Weight: 81.3 kg (179 lb 3.7 oz)  Body mass index is 28.07 kg/m².      Intake/Output Summary (Last 24 hours) at 4/27/2019 0727  Last data filed at 4/27/2019 0600  Gross per 24 hour   Intake 6283 ml   Output 2214 ml   Net 4069 ml       Physical Exam   Constitutional: She is oriented to person, place, and time. She appears well-developed and well-nourished.   HENT:   Head: Normocephalic and atraumatic.   Intubated   Eyes: Right eye exhibits no discharge. Left eye exhibits no discharge.   Neck: Normal range of motion. Neck supple.   Cardiovascular: Normal rate and regular rhythm.   Sternotomy, c/d/i.  4 CTs with SS output.  Cardiac Lead wires in place   Pulmonary/Chest: Effort normal. No respiratory distress.   Abdominal: Soft. She exhibits no distension.   Musculoskeletal: Normal range of motion.   Neurological: She is alert and oriented to person,  place, and time.   Skin: Skin is warm and dry.   Psychiatric: She has a normal mood and affect. Her behavior is normal.   Vitals reviewed.      Vents:  Oxygen Concentration (%): 28 (04/27/19 0348)    Lines/Drains/Airways     Central Venous Catheter Line                 Introducer 04/26/19 0725 1 day         Pulmonary Artery Catheter Assessment  04/26/19 0725 1 day         Percutaneous Central Line Insertion/Assessment - double lumen  04/26/19 0800 right internal jugular less than 1 day          Drain                 Urethral Catheter 04/26/19 0753 Temperature probe 16 Fr. less than 1 day         Y Chest Tube 1 and 2 04/26/19 1354 1 Anterior Mediastinal 19 Fr. Posterior Mediastinal 19 Fr. less than 1 day         Y Chest Tube 3 and 4 04/26/19 1355 Right Pleural 19 Fr. 4 Left Pleural 19 Fr. less than 1 day          Arterial Line                 Arterial Line 04/26/19 0712 Left Radial 1 day          Line                 Pacer Wires 04/26/19 1340 less than 1 day                Significant Labs:    CBC/Anemia Profile:  Recent Labs   Lab 04/26/19  1238  04/26/19  1417  04/27/19  0016 04/27/19  0304 04/27/19  0500   WBC 14.60*  --  20.58*  --   --  10.07  --    HGB 8.1*  --  11.4*  --   --  9.8*  --    HCT 24.3*   < > 35.2*   < > 28* 30.2* 26*   *  --  91*  --   --  85*  --    MCV 94  --  95  --   --  95  --    RDW 12.3  --  12.2  --   --  12.5  --     < > = values in this interval not displayed.        Chemistries:  Recent Labs   Lab 04/26/19  0335 04/26/19  1417 04/26/19 2001 04/27/19  0304   * 141  --  137   K 4.3 3.1* 4.2 4.2   CL 93* 104  --  103   CO2 22* 21*  --  24   BUN 36* 25*  --  22   CREATININE 1.1 0.9  --  0.8   CALCIUM 9.6 9.5  --  8.9   MG 2.5 3.5*  3.5*  --  2.3   PHOS 4.1 4.7*  4.7*  --  3.6       Significant Imaging:  I have reviewed all pertinent imaging results/findings within the past 24 hours.    Assessment/Plan:     S/P mitral valve repair  Ericka Lewis is a 64 y.o. female s/p redo  Mitral Valve Repair on 4/26 with Dr. Horowitz.  She was brought up to SICU extubated and on epi, but quickly needed BIPAP.    Neuro:   - PO pain meds PRN    Pulmonary:   - 3 L NC, wean as tolerated, pulm toilet    Cardiac:   - Currently maintaining pressures on 0.01 of epi, continuing to wean  - Received 500 ml albumin bolus after coming up    Renal:    - BUN/Cr normal preop    ID:   - Periop ancef    Hem/Onc:   -Stable H/H  -Protamine yesterday    Endocrine:    - Insulin drip    Fluids/Electrolytes/Nutrition/GI:   - Replace electrolytes PRN    PPx:  - DVT: Not started yet  - Bowel: PPI    DISPO:  - Continue SICU care.  Work on pulmonary and wean off pressors           Critical care was time spent personally by me on the following activities: development of treatment plan with patient or surrogate and bedside caregivers, discussions with consultants, evaluation of patient's response to treatment, examination of patient, ordering and performing treatments and interventions, ordering and review of laboratory studies, ordering and review of radiographic studies, pulse oximetry, re-evaluation of patient's condition.  This critical care time did not overlap with that of any other provider or involve time for any procedures.     Kamran Shelby MD  Critical Care - Surgery  [facility]

## 2019-04-27 NOTE — SUBJECTIVE & OBJECTIVE
Interval History/Significant Events: Pain and nausea this AM, otherwise doing well.    Follow-up For: Procedure(s) (LRB):  REPLACEMENT, MITRAL VALVE (N/A)  COMPLETE MAZE PROCEDURE USING CRYOBLATION (N/A)  \REPAIR , LEFT ATRIAL APPENDAGE,  (Left)    Post-Operative Day: 1 Day Post-Op    Objective:     Vital Signs (Most Recent):  Temp: 98.8 °F (37.1 °C) (04/27/19 0300)  Pulse: 66 (04/27/19 0645)  Resp: 19 (04/27/19 0645)  BP: 114/62 (04/27/19 0600)  SpO2: 97 % (04/27/19 0645) Vital Signs (24h Range):  Temp:  [97.5 °F (36.4 °C)-99.7 °F (37.6 °C)] 98.8 °F (37.1 °C)  Pulse:  [60-80] 66  Resp:  [9-34] 19  SpO2:  [92 %-100 %] 97 %  BP: ()/(52-67) 114/62  Arterial Line BP: ()/(46-68) 99/47     Weight: 81.3 kg (179 lb 3.7 oz)  Body mass index is 28.07 kg/m².      Intake/Output Summary (Last 24 hours) at 4/27/2019 0727  Last data filed at 4/27/2019 0600  Gross per 24 hour   Intake 6283 ml   Output 2214 ml   Net 4069 ml       Physical Exam   Constitutional: She is oriented to person, place, and time. She appears well-developed and well-nourished.   HENT:   Head: Normocephalic and atraumatic.   Intubated   Eyes: Right eye exhibits no discharge. Left eye exhibits no discharge.   Neck: Normal range of motion. Neck supple.   Cardiovascular: Normal rate and regular rhythm.   Sternotomy, c/d/i.  4 CTs with SS output.  Cardiac Lead wires in place   Pulmonary/Chest: Effort normal. No respiratory distress.   Abdominal: Soft. She exhibits no distension.   Musculoskeletal: Normal range of motion.   Neurological: She is alert and oriented to person, place, and time.   Skin: Skin is warm and dry.   Psychiatric: She has a normal mood and affect. Her behavior is normal.   Vitals reviewed.      Vents:  Oxygen Concentration (%): 28 (04/27/19 0348)    Lines/Drains/Airways     Central Venous Catheter Line                 Introducer 04/26/19 0770 1 day         Pulmonary Artery Catheter Assessment  04/26/19 0725 1 day          Percutaneous Central Line Insertion/Assessment - double lumen  04/26/19 0800 right internal jugular less than 1 day          Drain                 Urethral Catheter 04/26/19 0753 Temperature probe 16 Fr. less than 1 day         Y Chest Tube 1 and 2 04/26/19 1354 1 Anterior Mediastinal 19 Fr. Posterior Mediastinal 19 Fr. less than 1 day         Y Chest Tube 3 and 4 04/26/19 1355 Right Pleural 19 Fr. 4 Left Pleural 19 Fr. less than 1 day          Arterial Line                 Arterial Line 04/26/19 0712 Left Radial 1 day          Line                 Pacer Wires 04/26/19 1340 less than 1 day                Significant Labs:    CBC/Anemia Profile:  Recent Labs   Lab 04/26/19  1238  04/26/19  1417  04/27/19  0016 04/27/19  0304 04/27/19  0500   WBC 14.60*  --  20.58*  --   --  10.07  --    HGB 8.1*  --  11.4*  --   --  9.8*  --    HCT 24.3*   < > 35.2*   < > 28* 30.2* 26*   *  --  91*  --   --  85*  --    MCV 94  --  95  --   --  95  --    RDW 12.3  --  12.2  --   --  12.5  --     < > = values in this interval not displayed.        Chemistries:  Recent Labs   Lab 04/26/19  0335 04/26/19  1417 04/26/19 2001 04/27/19  0304   * 141  --  137   K 4.3 3.1* 4.2 4.2   CL 93* 104  --  103   CO2 22* 21*  --  24   BUN 36* 25*  --  22   CREATININE 1.1 0.9  --  0.8   CALCIUM 9.6 9.5  --  8.9   MG 2.5 3.5*  3.5*  --  2.3   PHOS 4.1 4.7*  4.7*  --  3.6       Significant Imaging:  I have reviewed all pertinent imaging results/findings within the past 24 hours.

## 2019-04-27 NOTE — NURSING
Dr. Cruz notified of HR 49-52, MAP 55-59.  Pt sitting in chair, external pacemaker attached with backup rate set at 45.  Epi gtt restarted at 0.01 mcg/kg/min.  Pt back in bed.  HR currently 57-60, MAP 57-61.  Will continue to monitor closely.

## 2019-04-28 LAB
ANION GAP SERPL CALC-SCNC: 8 MMOL/L (ref 8–16)
APTT BLDCRRT: 29.4 SEC (ref 21–32)
BASOPHILS # BLD AUTO: 0.01 K/UL (ref 0–0.2)
BASOPHILS NFR BLD: 0.2 % (ref 0–1.9)
BUN SERPL-MCNC: 17 MG/DL (ref 8–23)
CALCIUM SERPL-MCNC: 8.7 MG/DL (ref 8.7–10.5)
CHLORIDE SERPL-SCNC: 98 MMOL/L (ref 95–110)
CO2 SERPL-SCNC: 28 MMOL/L (ref 23–29)
CREAT SERPL-MCNC: 0.7 MG/DL (ref 0.5–1.4)
DIFFERENTIAL METHOD: ABNORMAL
EOSINOPHIL # BLD AUTO: 0.3 K/UL (ref 0–0.5)
EOSINOPHIL NFR BLD: 5.1 % (ref 0–8)
ERYTHROCYTE [DISTWIDTH] IN BLOOD BY AUTOMATED COUNT: 12.7 % (ref 11.5–14.5)
EST. GFR  (AFRICAN AMERICAN): >60 ML/MIN/1.73 M^2
EST. GFR  (NON AFRICAN AMERICAN): >60 ML/MIN/1.73 M^2
GLUCOSE SERPL-MCNC: 116 MG/DL (ref 70–110)
HCT VFR BLD AUTO: 28.7 % (ref 37–48.5)
HGB BLD-MCNC: 9.2 G/DL (ref 12–16)
IMM GRANULOCYTES # BLD AUTO: 0.03 K/UL (ref 0–0.04)
IMM GRANULOCYTES NFR BLD AUTO: 0.5 % (ref 0–0.5)
INR PPP: 1.1 (ref 0.8–1.2)
LYMPHOCYTES # BLD AUTO: 1 K/UL (ref 1–4.8)
LYMPHOCYTES NFR BLD: 16.5 % (ref 18–48)
MAGNESIUM SERPL-MCNC: 1.6 MG/DL (ref 1.6–2.6)
MAGNESIUM SERPL-MCNC: 2.1 MG/DL (ref 1.6–2.6)
MCH RBC QN AUTO: 30.9 PG (ref 27–31)
MCHC RBC AUTO-ENTMCNC: 32.1 G/DL (ref 32–36)
MCV RBC AUTO: 96 FL (ref 82–98)
MONOCYTES # BLD AUTO: 0.9 K/UL (ref 0.3–1)
MONOCYTES NFR BLD: 13.6 % (ref 4–15)
NEUTROPHILS # BLD AUTO: 4 K/UL (ref 1.8–7.7)
NEUTROPHILS NFR BLD: 64.1 % (ref 38–73)
NRBC BLD-RTO: 0 /100 WBC
PHOSPHATE SERPL-MCNC: 1.7 MG/DL (ref 2.7–4.5)
PLATELET # BLD AUTO: 60 K/UL (ref 150–350)
PMV BLD AUTO: 11.6 FL (ref 9.2–12.9)
POCT GLUCOSE: 114 MG/DL (ref 70–110)
POCT GLUCOSE: 115 MG/DL (ref 70–110)
POCT GLUCOSE: 120 MG/DL (ref 70–110)
POCT GLUCOSE: 123 MG/DL (ref 70–110)
POCT GLUCOSE: 133 MG/DL (ref 70–110)
POTASSIUM SERPL-SCNC: 2.9 MMOL/L (ref 3.5–5.1)
POTASSIUM SERPL-SCNC: 3.4 MMOL/L (ref 3.5–5.1)
POTASSIUM SERPL-SCNC: 4 MMOL/L (ref 3.5–5.1)
POTASSIUM SERPL-SCNC: 4.1 MMOL/L (ref 3.5–5.1)
PROTHROMBIN TIME: 11.4 SEC (ref 9–12.5)
RBC # BLD AUTO: 2.98 M/UL (ref 4–5.4)
SODIUM SERPL-SCNC: 134 MMOL/L (ref 136–145)
WBC # BLD AUTO: 6.24 K/UL (ref 3.9–12.7)

## 2019-04-28 PROCEDURE — 25000003 PHARM REV CODE 250: Performed by: STUDENT IN AN ORGANIZED HEALTH CARE EDUCATION/TRAINING PROGRAM

## 2019-04-28 PROCEDURE — 99232 SBSQ HOSP IP/OBS MODERATE 35: CPT | Mod: ,,, | Performed by: NURSE PRACTITIONER

## 2019-04-28 PROCEDURE — 63600175 PHARM REV CODE 636 W HCPCS: Performed by: THORACIC SURGERY (CARDIOTHORACIC VASCULAR SURGERY)

## 2019-04-28 PROCEDURE — C9113 INJ PANTOPRAZOLE SODIUM, VIA: HCPCS | Performed by: STUDENT IN AN ORGANIZED HEALTH CARE EDUCATION/TRAINING PROGRAM

## 2019-04-28 PROCEDURE — 83735 ASSAY OF MAGNESIUM: CPT

## 2019-04-28 PROCEDURE — 84100 ASSAY OF PHOSPHORUS: CPT

## 2019-04-28 PROCEDURE — 97165 OT EVAL LOW COMPLEX 30 MIN: CPT

## 2019-04-28 PROCEDURE — 99232 PR SUBSEQUENT HOSPITAL CARE,LEVL II: ICD-10-PCS | Mod: ,,, | Performed by: NURSE PRACTITIONER

## 2019-04-28 PROCEDURE — 63600175 PHARM REV CODE 636 W HCPCS: Performed by: STUDENT IN AN ORGANIZED HEALTH CARE EDUCATION/TRAINING PROGRAM

## 2019-04-28 PROCEDURE — 84132 ASSAY OF SERUM POTASSIUM: CPT | Mod: 91

## 2019-04-28 PROCEDURE — 80048 BASIC METABOLIC PNL TOTAL CA: CPT

## 2019-04-28 PROCEDURE — 85610 PROTHROMBIN TIME: CPT

## 2019-04-28 PROCEDURE — 97116 GAIT TRAINING THERAPY: CPT

## 2019-04-28 PROCEDURE — 85730 THROMBOPLASTIN TIME PARTIAL: CPT

## 2019-04-28 PROCEDURE — 97530 THERAPEUTIC ACTIVITIES: CPT

## 2019-04-28 PROCEDURE — 27000221 HC OXYGEN, UP TO 24 HOURS

## 2019-04-28 PROCEDURE — 83735 ASSAY OF MAGNESIUM: CPT | Mod: 91

## 2019-04-28 PROCEDURE — 94761 N-INVAS EAR/PLS OXIMETRY MLT: CPT

## 2019-04-28 PROCEDURE — 97161 PT EVAL LOW COMPLEX 20 MIN: CPT

## 2019-04-28 PROCEDURE — 99900035 HC TECH TIME PER 15 MIN (STAT)

## 2019-04-28 PROCEDURE — 99232 PR SUBSEQUENT HOSPITAL CARE,LEVL II: ICD-10-PCS | Mod: GC,,, | Performed by: SURGERY

## 2019-04-28 PROCEDURE — 85025 COMPLETE CBC W/AUTO DIFF WBC: CPT

## 2019-04-28 PROCEDURE — 99232 SBSQ HOSP IP/OBS MODERATE 35: CPT | Mod: GC,,, | Performed by: SURGERY

## 2019-04-28 PROCEDURE — 20000000 HC ICU ROOM

## 2019-04-28 PROCEDURE — 25000003 PHARM REV CODE 250: Performed by: THORACIC SURGERY (CARDIOTHORACIC VASCULAR SURGERY)

## 2019-04-28 RX ORDER — POTASSIUM CHLORIDE 20 MEQ/1
40 TABLET, EXTENDED RELEASE ORAL ONCE
Status: COMPLETED | OUTPATIENT
Start: 2019-04-28 | End: 2019-04-28

## 2019-04-28 RX ORDER — POTASSIUM CHLORIDE 7.45 MG/ML
10 INJECTION INTRAVENOUS ONCE
Status: COMPLETED | OUTPATIENT
Start: 2019-04-28 | End: 2019-04-28

## 2019-04-28 RX ORDER — POTASSIUM CHLORIDE 20 MEQ/1
20 TABLET, EXTENDED RELEASE ORAL 2 TIMES DAILY
Status: DISCONTINUED | OUTPATIENT
Start: 2019-04-28 | End: 2019-04-29

## 2019-04-28 RX ORDER — AMIODARONE HYDROCHLORIDE 200 MG/1
200 TABLET ORAL 2 TIMES DAILY
Status: DISCONTINUED | OUTPATIENT
Start: 2019-04-28 | End: 2019-05-02 | Stop reason: HOSPADM

## 2019-04-28 RX ORDER — FUROSEMIDE 10 MG/ML
40 INJECTION INTRAMUSCULAR; INTRAVENOUS 2 TIMES DAILY
Status: DISCONTINUED | OUTPATIENT
Start: 2019-04-28 | End: 2019-04-30

## 2019-04-28 RX ORDER — POTASSIUM CHLORIDE 20 MEQ/1
60 TABLET, EXTENDED RELEASE ORAL ONCE
Status: COMPLETED | OUTPATIENT
Start: 2019-04-29 | End: 2019-04-28

## 2019-04-28 RX ORDER — POTASSIUM CHLORIDE 20 MEQ/15ML
20 SOLUTION ORAL ONCE
Status: COMPLETED | OUTPATIENT
Start: 2019-04-28 | End: 2019-04-28

## 2019-04-28 RX ADMIN — POTASSIUM CHLORIDE 20 MEQ: 1500 TABLET, EXTENDED RELEASE ORAL at 08:04

## 2019-04-28 RX ADMIN — OXYCODONE HYDROCHLORIDE 10 MG: 10 TABLET ORAL at 09:04

## 2019-04-28 RX ADMIN — MUPIROCIN 1 G: 20 OINTMENT TOPICAL at 08:04

## 2019-04-28 RX ADMIN — CEFAZOLIN 2 G: 1 INJECTION, POWDER, FOR SOLUTION INTRAMUSCULAR; INTRAVENOUS at 05:04

## 2019-04-28 RX ADMIN — POTASSIUM CHLORIDE 20 MEQ: 200 INJECTION, SOLUTION INTRAVENOUS at 03:04

## 2019-04-28 RX ADMIN — DOCUSATE SODIUM 100 MG: 100 CAPSULE, LIQUID FILLED ORAL at 08:04

## 2019-04-28 RX ADMIN — MAGNESIUM SULFATE IN WATER 2 G: 40 INJECTION, SOLUTION INTRAVENOUS at 11:04

## 2019-04-28 RX ADMIN — OXYCODONE HYDROCHLORIDE 10 MG: 10 TABLET ORAL at 03:04

## 2019-04-28 RX ADMIN — OXYCODONE HYDROCHLORIDE 10 MG: 10 TABLET ORAL at 12:04

## 2019-04-28 RX ADMIN — LIDOCAINE 1 PATCH: 50 PATCH TOPICAL at 08:04

## 2019-04-28 RX ADMIN — ACETAMINOPHEN 650 MG: 325 TABLET ORAL at 05:04

## 2019-04-28 RX ADMIN — WARFARIN SODIUM 2 MG: 2 TABLET ORAL at 05:04

## 2019-04-28 RX ADMIN — POTASSIUM CHLORIDE 20 MEQ: 20 SOLUTION ORAL at 07:04

## 2019-04-28 RX ADMIN — FUROSEMIDE 40 MG: 10 INJECTION, SOLUTION INTRAMUSCULAR; INTRAVENOUS at 05:04

## 2019-04-28 RX ADMIN — AMIODARONE HYDROCHLORIDE 200 MG: 200 TABLET ORAL at 08:04

## 2019-04-28 RX ADMIN — POTASSIUM CHLORIDE 10 MEQ: 10 INJECTION, SOLUTION INTRAVENOUS at 08:04

## 2019-04-28 RX ADMIN — HYDROMORPHONE HYDROCHLORIDE 1 MG: 1 INJECTION, SOLUTION INTRAMUSCULAR; INTRAVENOUS; SUBCUTANEOUS at 07:04

## 2019-04-28 RX ADMIN — OXYCODONE HYDROCHLORIDE 10 MG: 10 TABLET ORAL at 05:04

## 2019-04-28 RX ADMIN — OXYCODONE HYDROCHLORIDE 10 MG: 10 TABLET ORAL at 07:04

## 2019-04-28 RX ADMIN — ACETAMINOPHEN 650 MG: 325 TABLET ORAL at 11:04

## 2019-04-28 RX ADMIN — POLYETHYLENE GLYCOL 3350 17 G: 17 POWDER, FOR SOLUTION ORAL at 08:04

## 2019-04-28 RX ADMIN — SODIUM PHOSPHATE, MONOBASIC, MONOHYDRATE 20.01 MMOL: 276; 142 INJECTION, SOLUTION INTRAVENOUS at 04:04

## 2019-04-28 RX ADMIN — FUROSEMIDE 40 MG: 10 INJECTION, SOLUTION INTRAMUSCULAR; INTRAVENOUS at 08:04

## 2019-04-28 RX ADMIN — POTASSIUM CHLORIDE 60 MEQ: 1500 TABLET, EXTENDED RELEASE ORAL at 11:04

## 2019-04-28 RX ADMIN — POTASSIUM CHLORIDE 40 MEQ: 1500 TABLET, EXTENDED RELEASE ORAL at 08:04

## 2019-04-28 RX ADMIN — PANTOPRAZOLE SODIUM 40 MG: 40 INJECTION, POWDER, LYOPHILIZED, FOR SOLUTION INTRAVENOUS at 08:04

## 2019-04-28 NOTE — PROGRESS NOTES
"[facility]  Endocrinology  Progress Note    Admit Date: 4/21/2019     Reason for Consult: Management of Hyperglycemia     Surgical Procedure and Date: Mitral valve replacement 4/26/19     Lab Results   Component Value Date    HGBA1C 5.3 04/11/2019     HPI:   Patient is a 64 y.o. female with a diagnosis of mitral valve prolapse s/p mitral valve repair (May 2018) and now with severe mitral stenosis, as well as paroxysmal atrial fibrillation.  She is s/p mitral valve replacement on 4/26/19. Mother with DM per chart review. Endocrinology consulted for management of hyperglycemia.                Interval HPI:   Overnight events: Remains in SICU. BG well controlled on transition IV insulin infusion at 0.8 units/hr.  Rate lowered to 0.4 units/hr per protocol.  Eating:   <25%  Nausea: No  Hypoglycemia and intervention: No  Fever: No  TPN and/or TF: No      BP (!) 96/52 (BP Location: Right arm, Patient Position: Sitting)   Pulse (!) 50   Temp 98.5 °F (36.9 °C) (Oral)   Resp (!) 28   Ht 5' 7" (1.702 m)   Wt 81.3 kg (179 lb 3.7 oz)   SpO2 100%   Breastfeeding? No   BMI 28.07 kg/m²      Labs Reviewed and Include    Recent Labs   Lab 04/28/19  0233 04/28/19  0734   *  --    CALCIUM 8.7  --    *  --    K 4.0 4.1   CO2 28  --    CL 98  --    BUN 17  --    CREATININE 0.7  --      Lab Results   Component Value Date    WBC 6.24 04/28/2019    HGB 9.2 (L) 04/28/2019    HCT 28.7 (L) 04/28/2019    MCV 96 04/28/2019    PLT 60 (L) 04/28/2019     No results for input(s): TSH, FREET4 in the last 168 hours.  Lab Results   Component Value Date    HGBA1C 5.3 04/11/2019       Nutritional status:   Body mass index is 28.07 kg/m².  Lab Results   Component Value Date    ALBUMIN 4.1 04/11/2019    ALBUMIN 3.7 04/08/2019     No results found for: PREALBUMIN    Estimated Creatinine Clearance: 91.4 mL/min (based on SCr of 0.7 mg/dL).    Accu-Checks  Recent Labs     04/27/19  0800 04/27/19  0910 04/27/19  1006 " 04/27/19  1102 04/27/19  1210 04/27/19  1334 04/27/19  1723 04/27/19  2142 04/28/19  0145 04/28/19  0727   POCTGLUCOSE 99 93 128* 134* 118* 139* 137* 136* 115* 133*       Current Medications and/or Treatments Impacting Glycemic Control  Immunotherapy:    Immunosuppressants     None        Steroids:   Hormones (From admission, onward)    None        Pressors:    Autonomic Drugs (From admission, onward)    Start     Stop Route Frequency Ordered    04/27/19 1150  EPINEPHrine (ADRENALIN) 5 mg in sodium chloride 0.9% 250 mL infusion     Question Answer Comment   Titrate by: (in mcg/kg/min) 0.01    Titrate interval: (in minutes) 5    Titrate to maintain: (SBP or MAP or Cardiac Index) MAP    Greater than: (in mmHg) 60    Maximum dose: (in mcg/kg/min) 3        -- IV Continuous 04/27/19 1151        Hyperglycemia/Diabetes Medications:   Antihyperglycemics (From admission, onward)    Start     Stop Route Frequency Ordered    04/27/19 1430  insulin regular (Humulin R) 100 Units in sodium chloride 0.9% 100 mL infusion      -- IV Continuous 04/27/19 1330    04/27/19 1429  insulin aspart U-100 pen 0-5 Units      -- SubQ As needed (PRN) 04/27/19 1330          ASSESSMENT and PLAN    * Mitral stenosis  Managed per primary team  Avoid hypoglycemia        Hyperglycemia  BG goal 110-140    Plan:  Transition IV insulin infusion at 0.4 units/hr with stepdown parameters  Low dose correction scale  BG monitoring AC/HS/0200    Discharge planning: TBD        S/P mitral valve repair  Post-op day 2  Managed per primary team              Nany Doan NP  Endocrinology  [facility]

## 2019-04-28 NOTE — SUBJECTIVE & OBJECTIVE
"Interval HPI:   Overnight events: Remains in SICU. BG well controlled on transition IV insulin infusion at 0.8 units/hr.  Rate lowered to 0.4 units/hr per protocol.  Eating:   <25%  Nausea: No  Hypoglycemia and intervention: No  Fever: No  TPN and/or TF: No      BP (!) 96/52 (BP Location: Right arm, Patient Position: Sitting)   Pulse (!) 50   Temp 98.5 °F (36.9 °C) (Oral)   Resp (!) 28   Ht 5' 7" (1.702 m)   Wt 81.3 kg (179 lb 3.7 oz)   SpO2 100%   Breastfeeding? No   BMI 28.07 kg/m²     Labs Reviewed and Include    Recent Labs   Lab 04/28/19  0233 04/28/19  0734   *  --    CALCIUM 8.7  --    *  --    K 4.0 4.1   CO2 28  --    CL 98  --    BUN 17  --    CREATININE 0.7  --      Lab Results   Component Value Date    WBC 6.24 04/28/2019    HGB 9.2 (L) 04/28/2019    HCT 28.7 (L) 04/28/2019    MCV 96 04/28/2019    PLT 60 (L) 04/28/2019     No results for input(s): TSH, FREET4 in the last 168 hours.  Lab Results   Component Value Date    HGBA1C 5.3 04/11/2019       Nutritional status:   Body mass index is 28.07 kg/m².  Lab Results   Component Value Date    ALBUMIN 4.1 04/11/2019    ALBUMIN 3.7 04/08/2019     No results found for: PREALBUMIN    Estimated Creatinine Clearance: 91.4 mL/min (based on SCr of 0.7 mg/dL).    Accu-Checks  Recent Labs     04/27/19  0800 04/27/19  0910 04/27/19  1006 04/27/19  1102 04/27/19  1210 04/27/19  1334 04/27/19  1723 04/27/19  2142 04/28/19  0145 04/28/19  0727   POCTGLUCOSE 99 93 128* 134* 118* 139* 137* 136* 115* 133*       Current Medications and/or Treatments Impacting Glycemic Control  Immunotherapy:    Immunosuppressants     None        Steroids:   Hormones (From admission, onward)    None        Pressors:    Autonomic Drugs (From admission, onward)    Start     Stop Route Frequency Ordered    04/27/19 1150  EPINEPHrine (ADRENALIN) 5 mg in sodium chloride 0.9% 250 mL infusion     Question Answer Comment   Titrate by: (in mcg/kg/min) 0.01    Titrate interval: (in " minutes) 5    Titrate to maintain: (SBP or MAP or Cardiac Index) MAP    Greater than: (in mmHg) 60    Maximum dose: (in mcg/kg/min) 3        -- IV Continuous 04/27/19 1151        Hyperglycemia/Diabetes Medications:   Antihyperglycemics (From admission, onward)    Start     Stop Route Frequency Ordered    04/27/19 1430  insulin regular (Humulin R) 100 Units in sodium chloride 0.9% 100 mL infusion      -- IV Continuous 04/27/19 1330    04/27/19 1429  insulin aspart U-100 pen 0-5 Units      -- SubQ As needed (PRN) 04/27/19 1330

## 2019-04-28 NOTE — ASSESSMENT & PLAN NOTE
BG goal 110-140    Plan:  Transition IV insulin infusion at 0.4 units/hr with stepdown parameters  Low dose correction scale  BG monitoring AC/HS/0200    Discharge planning: TBD

## 2019-04-28 NOTE — PT/OT/SLP EVAL
Physical Therapy Evaluation    Patient Name:  Ericka Lewis   MRN:  [id number]    Recommendations:     Discharge Recommendations:  (home no needs)   Discharge Equipment Recommendations: none   Barriers to discharge: None    Assessment:     Ericka Lewis is a 64 y.o. female admitted with a medical diagnosis of Mitral stenosis.  She presents with the following impairments/functional limitations:  impaired functional mobilty, gait instability, impaired endurance, pain pt raheel treatment fair but pain limited functional mobility. Pt will benefit from cont skilled PT 5x/wk to return pt to highest functional level possible. Pt will be able to discharge home with no needs when medically stable. Pt is s/p MVR, MAZE, L atrial appendage closure 4/26/19.    Rehab Prognosis: Good; patient would benefit from acute skilled PT services to address these deficits and reach maximum level of function.    Recent Surgery: Procedure(s) (LRB):  REPLACEMENT, MITRAL VALVE (N/A)  COMPLETE MAZE PROCEDURE USING CRYOBLATION (N/A)  \REPAIR , LEFT ATRIAL APPENDAGE,  (Left) 2 Days Post-Op    Plan:     During this hospitalization, patient to be seen 5 x/week to address the identified rehab impairments via gait training, therapeutic activities and progress toward the following goals:    · Plan of Care Expires:  05/25/19    Subjective     Chief Complaint: pt c/o pain during treatment.   Patient/Family Comments/goals:  To get better and go home.   Pain/Comfort:  · Pain Rating 1: 6/10  · Location - Side 1: Right  · Location 1: (flank)  · Pain Rating Post-Intervention 1: 6/10    Patients cultural, spiritual, Alevism conflicts given the current situation: no    Living Environment:  Pt is retired and lives with her retired  in 1 story with 4 steps and B handrails.   Prior to admission, patients level of function was Independent .  Equipment used at home: none.  DME owned (not currently used): none.  Upon discharge, patient will have assistance  from .    Objective:     Communicated with nurse prior to session.  Patient found up in chair with telemetry, arterial line, pulse ox (continuous), blood pressure cuff, brandt catheter, oxygen, central line, chest tube(CVP)  upon PT entry to room.    General Precautions: Standard, fall, sternal   Orthopedic Precautions:    Braces:       Exams:  · Cognitive Exam:  Patient is oriented to Person, Place, Time and Situation  · RLE ROM: WFL  · RLE Strength: WFL  · LLE ROM: WFL  · LLE Strength: WFL    Functional Mobility:  · Transfers:   Pt needed verbal cues for functional mobility with sternal precautions.   · Sit to Stand:  minimum assistance with no AD  ·   · Gait: pt received gait training ~ 2 steps forward/backward with CGA. Distance pt ambulated limted by medical lines and pain.       Therapeutic Activities and Exercises:   pt and  received verbal and written instructions in sternal precautions and both verbally expressed understanding of such.     AM-PAC 6 CLICK MOBILITY  Total Score:13     Patient left up in chair with all lines intact, call button in reach and  present.    GOALS:   Multidisciplinary Problems     Physical Therapy Goals        Problem: Physical Therapy Goal    Goal Priority Disciplines Outcome Goal Variances Interventions   Physical Therapy Goal     PT, PT/OT      Description:  Goals to be met by: 19    Patient will increase functional independence with mobility by performin. Supine to sit with Stand-by Assistance -not met  2. Sit to stand transfer with Supervision -not met  3. Gait  x 250 feet with Supervision -not met   4. Ascend/descend 4 stair with bilateral Handrails Supervision -not met                      History:     Past Medical History:   Diagnosis Date    Heart murmur     Hyperlipidemia     Hypertension        Past Surgical History:   Procedure Laterality Date    BACK SURGERY      CARDIAC CATHETERIZATION      INSERTION, CATHETER, RIGHT HEART Right  4/25/2019    Performed by Chandrakant Castanon MD at [facility] CATH LAB       Time Tracking:     PT Received On: 04/28/19  PT Start Time: 0803     PT Stop Time: 0815  PT Total Time (min): 12 min     Billable Minutes: Evaluation 12 min      Liz Jaime, PT  04/28/2019

## 2019-04-28 NOTE — SUBJECTIVE & OBJECTIVE
Interval History/Significant Events: NAEON. Pain is 4/10 and tolerable.  Sitting in chair currently.     Follow-up For: Procedure(s) (LRB):  REPLACEMENT, MITRAL VALVE (N/A)  COMPLETE MAZE PROCEDURE USING CRYOBLATION (N/A)  \REPAIR , LEFT ATRIAL APPENDAGE,  (Left)    Post-Operative Day: 2 Days Post-Op    Objective:     Vital Signs (Most Recent):  Temp: 98.5 °F (36.9 °C) (04/28/19 0700)  Pulse: (!) 57 (04/28/19 0730)  Resp: (!) 38 (04/28/19 0730)  BP: (!) 96/53 (04/28/19 0700)  SpO2: 100 % (04/28/19 0730) Vital Signs (24h Range):  Temp:  [97.9 °F (36.6 °C)-98.5 °F (36.9 °C)] 98.5 °F (36.9 °C)  Pulse:  [51-68] 57  Resp:  [11-41] 38  SpO2:  [90 %-100 %] 100 %  BP: ()/(51-65) 96/53  Arterial Line BP: ()/(40-81) 89/45     Weight: 81.3 kg (179 lb 3.7 oz)  Body mass index is 28.07 kg/m².      Intake/Output Summary (Last 24 hours) at 4/28/2019 0803  Last data filed at 4/28/2019 0700  Gross per 24 hour   Intake 1244 ml   Output 1734 ml   Net -490 ml       Physical Exam   Constitutional: She is oriented to person, place, and time. She appears well-developed and well-nourished.   HENT:   Head: Normocephalic and atraumatic.   Eyes: Right eye exhibits no discharge. Left eye exhibits no discharge.   Neck: Normal range of motion. Neck supple.   Cardiovascular: Normal rate and regular rhythm.   Sternotomy, c/d/i.  4 CTs with SS output.  Cardiac Lead wires in place   Pulmonary/Chest: Effort normal. No respiratory distress.   Extubated; NC   Abdominal: Soft. She exhibits no distension.   Musculoskeletal: Normal range of motion.   Neurological: She is alert and oriented to person, place, and time.   Skin: Skin is warm and dry.   Psychiatric: She has a normal mood and affect. Her behavior is normal.   Vitals reviewed.      Vents:  Oxygen Concentration (%): 28 (04/27/19 0348)    Lines/Drains/Airways     Central Venous Catheter Line                 Introducer 04/26/19 0725 2 days         Percutaneous Central Line  Insertion/Assessment - double lumen  04/26/19 0800 right internal jugular 2 days          Drain                 Urethral Catheter 04/26/19 0753 Temperature probe 16 Fr. 2 days         Y Chest Tube 1 and 2 04/26/19 1354 1 Anterior Mediastinal 19 Fr. Posterior Mediastinal 19 Fr. 1 day         Y Chest Tube 3 and 4 04/26/19 1355 Right Pleural 19 Fr. 4 Left Pleural 19 Fr. 1 day          Arterial Line                 Arterial Line 04/26/19 0712 Left Radial 2 days          Line                 Pacer Wires 04/26/19 1340 1 day          Peripheral Intravenous Line                 Peripheral IV - Single Lumen 04/27/19 1348 22 G Left Hand less than 1 day                Significant Labs:    CBC/Anemia Profile:  Recent Labs   Lab 04/26/19  1417  04/27/19  0304 04/27/19  0500 04/27/19  0822 04/28/19  0233   WBC 20.58*  --  10.07  --   --  6.24   HGB 11.4*  --  9.8*  --   --  9.2*   HCT 35.2*   < > 30.2* 26* 29* 28.7*   PLT 91*  --  85*  --   --  60*   MCV 95  --  95  --   --  96   RDW 12.2  --  12.5  --   --  12.7    < > = values in this interval not displayed.        Chemistries:  Recent Labs   Lab 04/26/19  1417  04/27/19  0304 04/27/19  0800 04/27/19  1912 04/28/19  0233     --  137  --   --  134*   K 3.1*   < > 4.2 3.7 3.3* 4.0     --  103  --   --  98   CO2 21*  --  24  --   --  28   BUN 25*  --  22  --   --  17   CREATININE 0.9  --  0.8  --   --  0.7   CALCIUM 9.5  --  8.9  --   --  8.7   MG 3.5*  3.5*  --  2.3  --   --  2.1   PHOS 4.7*  4.7*  --  3.6  --   --  1.7*    < > = values in this interval not displayed.       All pertinent labs within the past 24 hours have been reviewed.    Significant Imaging:  I have reviewed and interpreted all pertinent imaging results/findings within the past 24 hours.

## 2019-04-28 NOTE — PROGRESS NOTES
[facility]  Cardiothoracic Surgery  Progress Note    Patient Name: Ericka Lewis  MRN: [id number]  Admission Date: 4/21/2019  Hospital Length of Stay: 7 days  Code Status: Prior   Attending Physician: Yonatan Mcintosh MD   Referring Provider: Yonatan Mcintosh MD  Principal Problem:Mitral stenosis            Subjective:     Post-Op Info:  Procedure(s) (LRB):  REPLACEMENT, MITRAL VALVE (N/A)  COMPLETE MAZE PROCEDURE USING CRYOBLATION (N/A)  \REPAIR , LEFT ATRIAL APPENDAGE,  (Left)   2 Days Post-Op     Interval History:   2 Days Post-Op  NAEON.   Off gtts  Chest tube output downtrending   Mild Bradycardia, EKG with possible heart block, MAPs stable    ROS    Medications:  Continuous Infusions:   insulin (HUMAN R) infusion (adults) 0.4 Units/hr (04/28/19 1400)     Scheduled Meds:   acetaminophen  650 mg Oral Q6H    amiodarone  200 mg Oral BID    docusate sodium  100 mg Oral BID    furosemide  40 mg Intravenous BID    k phos di & mono-sod phos mono  500 mg Oral Once    lidocaine  1 patch Transdermal Q24H    mupirocin  1 g Nasal BID    pantoprazole  40 mg Intravenous Daily    polyethylene glycol  17 g Oral Daily    potassium chloride  20 mEq Oral BID    potassium, sodium phosphates  1 packet Oral Once    warfarin  2 mg Oral Daily     PRN Meds:albumin human 5%, aspirin, bisacodyl, dextrose 50%, dextrose 50%, glucagon (human recombinant), glucose, glucose, HYDROmorphone, insulin aspart U-100, k phos di & mono-sod phos mono, magnesium sulfate IVPB, magnesium sulfate IVPB, metoclopramide HCl, ondansetron, oxyCODONE, oxyCODONE, potassium chloride in water **AND** potassium chloride in water **AND** potassium chloride in water, sodium chloride 0.9%, sodium phosphate IVPB, sodium phosphate IVPB, sodium phosphate IVPB     Objective:     Vital Signs (Most Recent):  Temp: 98.2 °F (36.8 °C) (04/28/19 1100)  Pulse: (!) 57 (04/28/19 1430)  Resp: (!) 35 (04/28/19 1430)  BP: (!) 102/53 (04/28/19  1400)  SpO2: 100 % (04/28/19 1430) Vital Signs (24h Range):  Temp:  [98.1 °F (36.7 °C)-98.5 °F (36.9 °C)] 98.2 °F (36.8 °C)  Pulse:  [50-65] 57  Resp:  [12-45] 35  SpO2:  [92 %-100 %] 100 %  BP: ()/(52-65) 102/53  Arterial Line BP: ()/(44-81) 98/45     Weight: 81.3 kg (179 lb 3.7 oz)  Body mass index is 28.07 kg/m².    SpO2: 100 %  O2 Device (Oxygen Therapy): nasal cannula    Intake/Output - Last 3 Shifts       04/26 0700 - 04/27 0659 04/27 0700 - 04/28 0659 04/28 0700 - 04/29 0659    P.O. 800 870     I.V. (mL/kg) 4533 (55.8) 374 (4.6)     Blood 850      IV Piggyback 100      Total Intake(mL/kg) 6283 (77.3) 1244 (15.3)     Urine (mL/kg/hr) 1704 (0.9) 1419 (0.7) 1745 (2.6)    Chest Tube 510 310 230    Total Output 2214 1729 1975    Net +4069 -485 -1975                 Lines/Drains/Airways     Drain                 Urethral Catheter 04/26/19 0753 Temperature probe 16 Fr. 2 days         Y Chest Tube 1 and 2 04/26/19 1354 1 Anterior Mediastinal 19 Fr. Posterior Mediastinal 19 Fr. 2 days         Y Chest Tube 3 and 4 04/26/19 1355 Right Pleural 19 Fr. 4 Left Pleural 19 Fr. 2 days          Arterial Line                 Arterial Line 04/26/19 0712 Left Radial 2 days          Line                 Pacer Wires 04/26/19 1340 2 days          Peripheral Intravenous Line                 Peripheral IV - Single Lumen 04/27/19 1348 22 G Left Hand 1 day         Peripheral IV - Single Lumen 04/28/19 1040 20 G Left Antecubital less than 1 day                Physical Exam    A&O, NAD  RRR  No Resp distress 2L NC  Chest with dressing c/d/i  Chest tubes with SS output  Ext warm    Significant Labs:  CBC:   Recent Labs   Lab 04/28/19  0233   WBC 6.24   RBC 2.98*   HGB 9.2*   HCT 28.7*   PLT 60*   MCV 96   MCH 30.9   MCHC 32.1     CMP:   Recent Labs   Lab 04/28/19  0233 04/28/19  0734   *  --    CALCIUM 8.7  --    *  --    K 4.0 4.1   CO2 28  --    CL 98  --    BUN 17  --    CREATININE 0.7  --      Coagulation:    Recent Labs   Lab 04/28/19  0233   INR 1.1   APTT 29.4     All pertinent labs from the last 24 hours have been reviewed.    Significant Diagnostics:  CXR: I have reviewed all pertinent results/findings within the past 24 hours    Assessment/Plan:     * Mitral stenosis  Ericka Lewis is a 64 y.o. female s/p redo Mitral Valve Repair on 4/26 with Dr. Horowitz.     Neuro:   - PO pain meds PRN  - PRN dilaudid      Pulmonary:   - 3 L NC, wean as tolerated, pulm toilet  - IS  - Keep Chest tubes     Cardiac:   Epi off, normotensive, but with intermittent bradycardia into the 50s  - Backup pacer set to 45  - Coumadin 2 scheduled   - Amio 200 BID  - Holding BB due to bradycardia     Renal:    - BUN/Cr normal preop  - Lasix 40 BID     ID:   - Periop ancef     Hem/Onc:   -Stable H/H  -Protamine given 4/26 for rebound heparin action     Endocrine:    - Insulin drip     Fluids/Electrolytes/Nutrition/GI:   - Replace electrolytes PRN  - cardiac diet     PPx:  - DVT: SCDs  - Bowel: PPI     DISPO:  - Continue SICU care.                Reji rCuz MD  Cardiothoracic Surgery  [facility]

## 2019-04-28 NOTE — NURSING
Dr. Mcintosh notified of HR 49-60 and MAP 55-70 with amiodarone 400mg PO scheduled to be given now.  Dose to be changed to 200mg BID.  Will continue to monitor.

## 2019-04-28 NOTE — PLAN OF CARE
Problem: Adult Inpatient Plan of Care  Goal: Plan of Care Review  Outcome: Ongoing (interventions implemented as appropriate)  POC reviewed with patient who verbalized understanding. Pt tolerating diet well, mild intermittent nausea,ofran with relief. Oxycodone 10mg given every 3 hours with pain relief, Dilaudid given once. Urine output has been adequate. Pain being controlled with PRN pain medication. Chest tubes minimal output. Pt remained free from falls throughout the shift VSS. No distress noted, will continue to monitor.

## 2019-04-28 NOTE — PROGRESS NOTES
Pharmacist Intervention IV to PO Note    Ericka Lewis is a 64 y.o. female being treated with IV medication pantoprazole    Patient Data:    Vital Signs (Most Recent):  Temp: 98.2 °F (36.8 °C) (04/28/19 1100)  Pulse: (!) 50 (04/28/19 1200)  Resp: (!) 27 (04/28/19 1200)  BP: (!) 109/58 (04/28/19 1200)  SpO2: 97 % (04/28/19 1200)   Vital Signs (72h Range):  Temp:  [97.5 °F (36.4 °C)-99.7 °F (37.6 °C)]   Pulse:  [50-91]   Resp:  [9-45]   BP: ()/(51-75)   SpO2:  [90 %-100 %]   Arterial Line BP: ()/(40-81)      CBC:  Recent Labs   Lab 04/26/19  1417  04/27/19  0304 04/27/19  0500 04/27/19  0822 04/28/19  0233   WBC 20.58*  --  10.07  --   --  6.24   RBC 3.71*  --  3.19*  --   --  2.98*   HGB 11.4*  --  9.8*  --   --  9.2*   HCT 35.2*   < > 30.2* 26* 29* 28.7*   PLT 91*  --  85*  --   --  60*   MCV 95  --  95  --   --  96   MCH 30.7  --  30.7  --   --  30.9   MCHC 32.4  --  32.5  --   --  32.1    < > = values in this interval not displayed.     CMP:     Recent Labs   Lab 04/26/19  1417  04/27/19  0304  04/27/19  1912 04/28/19  0233 04/28/19  0734   *  --  109  --   --  116*  --    CALCIUM 9.5  --  8.9  --   --  8.7  --      --  137  --   --  134*  --    K 3.1*   < > 4.2   < > 3.3* 4.0 4.1   CO2 21*  --  24  --   --  28  --      --  103  --   --  98  --    BUN 25*  --  22  --   --  17  --    CREATININE 0.9  --  0.8  --   --  0.7  --     < > = values in this interval not displayed.       Dietary Orders:  Diet Orders            Diet Cardiac [facility]: Cardiac starting at 04/28 1000            Based on the following criteria, this patient qualifies for intravenous to oral conversion:  [x] The patient?s gastrointestinal tract is functioning (tolerating medications via oral or enteral route for 24 hours and tolerating food or enteral feeds for 24 hours).  [x] The patient is hemodynamically stable for 24 hours (heart rate <100 beats per minute, systolic blood pressure >99 mm Hg, and  respiratory rate <20 breaths per minute).  [x] The patient shows clinical improvement (afebrile for at least 24 hours and white blood cell count downtrending or normalized). Additionally, the patient must be non-neutropenic (absolute neutrophil count >500 cells/mm3).  [x] For antimicrobials, the patient has received IV therapy for at least 24 hours.    IV medication pantoprazole 40 mg daily will be changed to oral medication pantoprazole 40 mg daily.    Pharmacist's Name: Warner Ziegler  Pharmacist's Extension: [phone]

## 2019-04-28 NOTE — PLAN OF CARE
Problem: Physical Therapy Goal  Goal: Physical Therapy Goal  Goals to be met by: 19    Patient will increase functional independence with mobility by performin. Supine to sit with Stand-by Assistance -not met  2. Sit to stand transfer with Supervision -not met  3. Gait  x 250 feet with Supervision -not met   4. Ascend/descend 4 stair with bilateral Handrails Supervision -not met    Evaluation completed and goals appropriate. Liz Jaime, PT 2019

## 2019-04-28 NOTE — NURSING
Dr. Cruz notified of continued bradycardia, HR now 45-60, BP stable (MAP 60-70s).  External pacer in place with back up rate at 45 bpm.  EKG ordered.  Will continue to monitor.

## 2019-04-28 NOTE — PLAN OF CARE
Problem: Occupational Therapy Goal  Goal: Occupational Therapy Goal  Goals to be met by: 5/13/19     Patient will increase functional independence with ADLs by performing:    UE Dressing with Modified [place].  LE Dressing with Modified [place].  Grooming while standing at sink with Modified [place].  Toileting from toilet with Modified [place] for hygiene and clothing management.   Bathing from  shower chair/bench with Modified [place].  Toilet transfer to toilet with Modified [place].  Increased functional strength to WFL for B UE.  Upper extremity exercise program x15 reps per handout, with independence.    POC initiated.

## 2019-04-28 NOTE — PROGRESS NOTES
[facility]  Critical Care - Surgery  Progress Note    Patient Name: Ericka Lewis  MRN: [id number]  Admission Date: 4/21/2019  Hospital Length of Stay: 7 days  Code Status: Prior  Attending Provider: Yonatan Mcintosh MD  Primary Care Provider: Primary Doctor No   Principal Problem: Mitral stenosis    Subjective:     Hospital/ICU Course:  No notes on file    Interval History/Significant Events: NAEON. Pain is 4/10 and tolerable.  Sitting in chair currently.     Follow-up For: Procedure(s) (LRB):  REPLACEMENT, MITRAL VALVE (N/A)  COMPLETE MAZE PROCEDURE USING CRYOBLATION (N/A)  \REPAIR , LEFT ATRIAL APPENDAGE,  (Left)    Post-Operative Day: 2 Days Post-Op    Objective:     Vital Signs (Most Recent):  Temp: 98.5 °F (36.9 °C) (04/28/19 0700)  Pulse: (!) 57 (04/28/19 0730)  Resp: (!) 38 (04/28/19 0730)  BP: (!) 96/53 (04/28/19 0700)  SpO2: 100 % (04/28/19 0730) Vital Signs (24h Range):  Temp:  [97.9 °F (36.6 °C)-98.5 °F (36.9 °C)] 98.5 °F (36.9 °C)  Pulse:  [51-68] 57  Resp:  [11-41] 38  SpO2:  [90 %-100 %] 100 %  BP: ()/(51-65) 96/53  Arterial Line BP: ()/(40-81) 89/45     Weight: 81.3 kg (179 lb 3.7 oz)  Body mass index is 28.07 kg/m².      Intake/Output Summary (Last 24 hours) at 4/28/2019 0803  Last data filed at 4/28/2019 0700  Gross per 24 hour   Intake 1244 ml   Output 1734 ml   Net -490 ml       Physical Exam   Constitutional: She is oriented to person, place, and time. She appears well-developed and well-nourished.   HENT:   Head: Normocephalic and atraumatic.   Eyes: Right eye exhibits no discharge. Left eye exhibits no discharge.   Neck: Normal range of motion. Neck supple.   Cardiovascular: Normal rate and regular rhythm.   Sternotomy, c/d/i.  4 CTs with SS output.  Cardiac Lead wires in place   Pulmonary/Chest: Effort normal. No respiratory distress.   Extubated; NC   Abdominal: Soft. She exhibits no distension.   Musculoskeletal: Normal range of motion.   Neurological: She  is alert and oriented to person, place, and time.   Skin: Skin is warm and dry.   Psychiatric: She has a normal mood and affect. Her behavior is normal.   Vitals reviewed.      Vents:  Oxygen Concentration (%): 28 (04/27/19 0348)    Lines/Drains/Airways     Central Venous Catheter Line                 Introducer 04/26/19 0725 2 days         Percutaneous Central Line Insertion/Assessment - double lumen  04/26/19 0800 right internal jugular 2 days          Drain                 Urethral Catheter 04/26/19 0753 Temperature probe 16 Fr. 2 days         Y Chest Tube 1 and 2 04/26/19 1354 1 Anterior Mediastinal 19 Fr. Posterior Mediastinal 19 Fr. 1 day         Y Chest Tube 3 and 4 04/26/19 1355 Right Pleural 19 Fr. 4 Left Pleural 19 Fr. 1 day          Arterial Line                 Arterial Line 04/26/19 0712 Left Radial 2 days          Line                 Pacer Wires 04/26/19 1340 1 day          Peripheral Intravenous Line                 Peripheral IV - Single Lumen 04/27/19 1348 22 G Left Hand less than 1 day                Significant Labs:    CBC/Anemia Profile:  Recent Labs   Lab 04/26/19  1417  04/27/19  0304 04/27/19  0500 04/27/19  0822 04/28/19  0233   WBC 20.58*  --  10.07  --   --  6.24   HGB 11.4*  --  9.8*  --   --  9.2*   HCT 35.2*   < > 30.2* 26* 29* 28.7*   PLT 91*  --  85*  --   --  60*   MCV 95  --  95  --   --  96   RDW 12.2  --  12.5  --   --  12.7    < > = values in this interval not displayed.        Chemistries:  Recent Labs   Lab 04/26/19  1417  04/27/19  0304 04/27/19  0800 04/27/19  1912 04/28/19  0233     --  137  --   --  134*   K 3.1*   < > 4.2 3.7 3.3* 4.0     --  103  --   --  98   CO2 21*  --  24  --   --  28   BUN 25*  --  22  --   --  17   CREATININE 0.9  --  0.8  --   --  0.7   CALCIUM 9.5  --  8.9  --   --  8.7   MG 3.5*  3.5*  --  2.3  --   --  2.1   PHOS 4.7*  4.7*  --  3.6  --   --  1.7*    < > = values in this interval not displayed.       All pertinent labs within the  past 24 hours have been reviewed.    Significant Imaging:  I have reviewed and interpreted all pertinent imaging results/findings within the past 24 hours.    Assessment/Plan:     S/P mitral valve repair  Ericka Lewis is a 64 y.o. female s/p redo Mitral Valve Repair on 4/26 with Dr. Horowitz.  She was brought up to SICU extubated and on epi, but quickly needed BIPAP.    Neuro:   - PO pain meds PRN    Pulmonary:   - 2 L NC, wean as tolerated, pulm toilet    Cardiac:   - Currently maintaining pressures off epi gtt   - HDS off pressors  - Amiodarone 400 mg BID  - Lasix 20 g IV BID  -  mg QD  - Warfain 2 mg QD    Renal:    - BUN/Cr normal preop  - Remain WNL; cont to monitor    ID:   - Periop ancef  - WBC wnl; afebrile    Hem/Onc:   -Stable H/H  - Warfarin 2 mg QD; INR 1.1    Endocrine:    - Insulin drip; consider transition to SSI    Fluids/Electrolytes/Nutrition/GI:   - Replace electrolytes PRN    PPx:  - DVT: Not started yet  - Bowel: PPI    DISPO:  - Continue SICU care.             Critical care was time spent personally by me on the following activities: development of treatment plan with patient or surrogate and bedside caregivers, discussions with consultants, evaluation of patient's response to treatment, examination of patient, ordering and performing treatments and interventions, ordering and review of laboratory studies, ordering and review of radiographic studies, pulse oximetry, re-evaluation of patient's condition.  This critical care time did not overlap with that of any other provider or involve time for any procedures.     Antoinette Winslow DO  Critical Care - Surgery  [facility]

## 2019-04-28 NOTE — ASSESSMENT & PLAN NOTE
Ericka Lewis is a 64 y.o. female s/p redo Mitral Valve Repair on 4/26 with Dr. Horowitz.  She was brought up to SICU extubated and on epi, but quickly needed BIPAP.    Neuro:   - PO pain meds PRN    Pulmonary:   - 2 L NC, wean as tolerated, pulm toilet    Cardiac:   - Currently maintaining pressures off epi gtt   - HDS off pressors  - Amiodarone 400 mg BID  - Lasix 20 g IV BID  -  mg QD  - Warfain 2 mg QD    Renal:    - BUN/Cr normal preop  - Remain WNL; cont to monitor    ID:   - Periop ancef  - WBC wnl; afebrile    Hem/Onc:   -Stable H/H  - Warfarin 2 mg QD; INR 1.1    Endocrine:    - Insulin drip; consider transition to SSI    Fluids/Electrolytes/Nutrition/GI:   - Replace electrolytes PRN    PPx:  - DVT: Not started yet  - Bowel: PPI    DISPO:  - Continue SICU care.

## 2019-04-28 NOTE — PT/OT/SLP EVAL
Occupational Therapy   Evaluation    Name: Ericka Lewis  MRN: [id number]  Admitting Diagnosis:  Mitral stenosis 2 Days Post-Op    Recommendations:     Discharge Recommendations: home  Discharge Equipment Recommendations:  none  Barriers to discharge:  None    Assessment:     Ericka Lewis is a 64 y.o. female with a medical diagnosis of Mitral stenosis.  She was able to perform sit/stand T/F c min A.  Pt c SOB during assessment.  Has 3/5 B UE strength at this time.  Able to perform UB dressing c total assist.  Performance deficits affecting function: weakness, impaired endurance, impaired self care skills, impaired functional mobilty, impaired cardiopulmonary response to activity, orthopedic precautions, decreased ROM, decreased upper extremity function.      Rehab Prognosis: Good; patient would benefit from acute skilled OT services to address these deficits and reach maximum level of function.       Plan:     Patient to be seen 5 x/week to address the above listed problems via self-care/home management, therapeutic exercises, therapeutic activities  · Plan of Care Expires: 05/12/19  · Plan of Care Reviewed with: patient, spouse    Subjective     Chief Complaint: Pt is s/p CABG and MAZE procedure and has sternal precautions.  Patient/Family Comments/goals: To get better.    Occupational Profile:  Living Environment: Pt lives in a one story house c 4 MELISA and B rails.  Has a tub/shower combo.  Previous level of function: I PTA  Roles and Routines: Retired  Equipment Used at Home:  none  Assistance upon Discharge:     Pain/Comfort:  · Pain Rating 1: 6/10(Back)    Patients cultural, spiritual, Rastafari conflicts given the current situation: no    Objective:     Communicated with: RN prior to session.  Patient found up in chair with arterial line, blood pressure cuff, brandt catheter, oxygen, peripheral IV, pulse ox (continuous), telemetry upon OT entry to room.    General Precautions: Standard, fall, sternal    Orthopedic Precautions:N/A   Braces: N/A     Occupational Performance:        Functional Mobility/Transfers:  · Patient completed Sit <> Stand Transfer with moderate assistance  with  hand-held assist   · Functional Mobility: Pt was only able to take 1-2 steps.  During assessment pt became anxious and O2 SATS dropped to 81.  After a few minutes O2 SATS returned to 94.    Activities of Daily Living:  · Upper Body Dressing: total assistance to don hospital gown.    Cognitive/Visual Perceptual:  Cognitive/Psychosocial Skills:     -       Oriented to: Person, Place, Time and Situation   -       Follows Commands/attention:Follows multistep  commands    Physical Exam:  Upper Extremity Range of Motion:     -       Right Upper Extremity: WFL  -       Left Upper Extremity: WFL  Upper Extremity Strength:    -       Right Upper Extremity: WFL  -       Left Upper Extremity: WFL    AMPAC 6 Click ADL:  AMPAC Total Score: 13  Education:    Patient left up in chair with all lines intact, call button in reach, RN notified and family present    GOALS:   Multidisciplinary Problems     Occupational Therapy Goals        Problem: Occupational Therapy Goal    Goal Priority Disciplines Outcome Interventions   Occupational Therapy Goal     OT, PT/OT     Description:  Goals to be met by: 5/13/19     Patient will increase functional independence with ADLs by performing:    UE Dressing with Modified [place].  LE Dressing with Modified [place].  Grooming while standing at sink with Modified [place].  Toileting from toilet with Modified [place] for hygiene and clothing management.   Bathing from  shower chair/bench with Modified [place].  Toilet transfer to toilet with Modified [place].  Increased functional strength to WFL for B UE.  Upper extremity exercise program x15 reps per handout, with independence.                      History:     Past Medical History:   Diagnosis Date    Heart murmur      Hyperlipidemia     Hypertension        Past Surgical History:   Procedure Laterality Date    BACK SURGERY      CARDIAC CATHETERIZATION      INSERTION, CATHETER, RIGHT HEART Right 4/25/2019    Performed by Chandrakant Castanon MD at [facility] CATH [facility]       Time Tracking:     OT Date of Treatment: 04/28/19  OT Start Time: 0750  OT Stop Time: 0812  OT Total Time (min): 22 min    Billable Minutes:Evaluation 11  Therapeutic Activity 11    JASMIN Smallwood  4/28/2019

## 2019-04-28 NOTE — SUBJECTIVE & OBJECTIVE
Interval History:   2 Days Post-Op  NAEON.   Off gtts  Chest tube output downtrending   Mild Bradycardia, EKG with possible heart block, MAPs stable    ROS    Medications:  Continuous Infusions:   insulin (HUMAN R) infusion (adults) 0.4 Units/hr (04/28/19 1400)     Scheduled Meds:   acetaminophen  650 mg Oral Q6H    amiodarone  200 mg Oral BID    docusate sodium  100 mg Oral BID    furosemide  40 mg Intravenous BID    k phos di & mono-sod phos mono  500 mg Oral Once    lidocaine  1 patch Transdermal Q24H    mupirocin  1 g Nasal BID    pantoprazole  40 mg Intravenous Daily    polyethylene glycol  17 g Oral Daily    potassium chloride  20 mEq Oral BID    potassium, sodium phosphates  1 packet Oral Once    warfarin  2 mg Oral Daily     PRN Meds:albumin human 5%, aspirin, bisacodyl, dextrose 50%, dextrose 50%, glucagon (human recombinant), glucose, glucose, HYDROmorphone, insulin aspart U-100, k phos di & mono-sod phos mono, magnesium sulfate IVPB, magnesium sulfate IVPB, metoclopramide HCl, ondansetron, oxyCODONE, oxyCODONE, potassium chloride in water **AND** potassium chloride in water **AND** potassium chloride in water, sodium chloride 0.9%, sodium phosphate IVPB, sodium phosphate IVPB, sodium phosphate IVPB     Objective:     Vital Signs (Most Recent):  Temp: 98.2 °F (36.8 °C) (04/28/19 1100)  Pulse: (!) 57 (04/28/19 1430)  Resp: (!) 35 (04/28/19 1430)  BP: (!) 102/53 (04/28/19 1400)  SpO2: 100 % (04/28/19 1430) Vital Signs (24h Range):  Temp:  [98.1 °F (36.7 °C)-98.5 °F (36.9 °C)] 98.2 °F (36.8 °C)  Pulse:  [50-65] 57  Resp:  [12-45] 35  SpO2:  [92 %-100 %] 100 %  BP: ()/(52-65) 102/53  Arterial Line BP: ()/(44-81) 98/45     Weight: 81.3 kg (179 lb 3.7 oz)  Body mass index is 28.07 kg/m².    SpO2: 100 %  O2 Device (Oxygen Therapy): nasal cannula    Intake/Output - Last 3 Shifts       04/26 0700 - 04/27 0659 04/27 0700 - 04/28 0659 04/28 0700 - 04/29 0659    P.O. 800 870     I.V. (mL/kg)  4533 (55.8) 374 (4.6)     Blood 850      IV Piggyback 100      Total Intake(mL/kg) 6283 (77.3) 1244 (15.3)     Urine (mL/kg/hr) 1704 (0.9) 1419 (0.7) 1745 (2.6)    Chest Tube 510 310 230    Total Output 2214 1729 1975    Net +4069 -485 -1975                 Lines/Drains/Airways     Drain                 Urethral Catheter 04/26/19 0753 Temperature probe 16 Fr. 2 days         Y Chest Tube 1 and 2 04/26/19 1354 1 Anterior Mediastinal 19 Fr. Posterior Mediastinal 19 Fr. 2 days         Y Chest Tube 3 and 4 04/26/19 1355 Right Pleural 19 Fr. 4 Left Pleural 19 Fr. 2 days          Arterial Line                 Arterial Line 04/26/19 0712 Left Radial 2 days          Line                 Pacer Wires 04/26/19 1340 2 days          Peripheral Intravenous Line                 Peripheral IV - Single Lumen 04/27/19 1348 22 G Left Hand 1 day         Peripheral IV - Single Lumen 04/28/19 1040 20 G Left Antecubital less than 1 day                Physical Exam    A&O, NAD  RRR  No Resp distress 2L NC  Chest with dressing c/d/i  Chest tubes with SS output  Ext warm    Significant Labs:  CBC:   Recent Labs   Lab 04/28/19 0233   WBC 6.24   RBC 2.98*   HGB 9.2*   HCT 28.7*   PLT 60*   MCV 96   MCH 30.9   MCHC 32.1     CMP:   Recent Labs   Lab 04/28/19 0233 04/28/19  0734   *  --    CALCIUM 8.7  --    *  --    K 4.0 4.1   CO2 28  --    CL 98  --    BUN 17  --    CREATININE 0.7  --      Coagulation:   Recent Labs   Lab 04/28/19 0233   INR 1.1   APTT 29.4     All pertinent labs from the last 24 hours have been reviewed.    Significant Diagnostics:  CXR: I have reviewed all pertinent results/findings within the past 24 hours

## 2019-04-28 NOTE — ASSESSMENT & PLAN NOTE
Ericka Lewis is a 64 y.o. female s/p redo Mitral Valve Repair on 4/26 with Dr. Horowitz.     Neuro:   - PO pain meds PRN  - PRN dilaudid      Pulmonary:   - 3 L NC, wean as tolerated, pulm toilet  - IS  - Keep Chest tubes     Cardiac:   Epi off, normotensive, but with intermittent bradycardia into the 50s  - Backup pacer set to 45  - Coumadin 2 scheduled   - Amio 200 BID  - Holding BB due to bradycardia     Renal:    - BUN/Cr normal preop  - Lasix 40 BID     ID:   - Periop ancef     Hem/Onc:   -Stable H/H  -Protamine given 4/26 for rebound heparin action     Endocrine:    - Insulin drip     Fluids/Electrolytes/Nutrition/GI:   - Replace electrolytes PRN  - cardiac diet     PPx:  - DVT: SCDs  - Bowel: PPI     DISPO:  - Continue SICU care.

## 2019-04-28 NOTE — NURSING
Plan of care reviewed with patient and patient's spouse.  Remained on 2L nasal cannula throughout the day with 02 sats 100%.  OOB to chair for several hours today, tolerated well.  Insulin gtt continued, currently at 0.3 units/hr. Diet advanced to cardiac, and patient tolerating well with no c/o nausea. Central line d/c'd this afternoon.  UOP remains adequate.  CT's with moderate serosanguinous output. Pain well controlled with scheduled acetaminophen and PRN oxycodone and dilaudid.  VSS throughout the day, see flowsheet for full assessment.

## 2019-04-29 LAB
ANION GAP SERPL CALC-SCNC: 10 MMOL/L (ref 8–16)
APTT BLDCRRT: 30.7 SEC (ref 21–32)
BACTERIA NPH AEROBE CULT: NORMAL
BASOPHILS # BLD AUTO: 0.02 K/UL (ref 0–0.2)
BASOPHILS NFR BLD: 0.3 % (ref 0–1.9)
BLD PROD TYP BPU: NORMAL
BLOOD UNIT EXPIRATION DATE: NORMAL
BLOOD UNIT TYPE CODE: 5100
BLOOD UNIT TYPE CODE: 9500
BLOOD UNIT TYPE: NORMAL
BUN SERPL-MCNC: 12 MG/DL (ref 8–23)
CALCIUM SERPL-MCNC: 9.1 MG/DL (ref 8.7–10.5)
CHLORIDE SERPL-SCNC: 92 MMOL/L (ref 95–110)
CO2 SERPL-SCNC: 33 MMOL/L (ref 23–29)
CODING SYSTEM: NORMAL
CREAT SERPL-MCNC: 0.6 MG/DL (ref 0.5–1.4)
DIFFERENTIAL METHOD: ABNORMAL
DISPENSE STATUS: NORMAL
EOSINOPHIL # BLD AUTO: 0.3 K/UL (ref 0–0.5)
EOSINOPHIL NFR BLD: 4.9 % (ref 0–8)
ERYTHROCYTE [DISTWIDTH] IN BLOOD BY AUTOMATED COUNT: 12.5 % (ref 11.5–14.5)
EST. GFR  (AFRICAN AMERICAN): >60 ML/MIN/1.73 M^2
EST. GFR  (NON AFRICAN AMERICAN): >60 ML/MIN/1.73 M^2
FIO2: 80
GLUCOSE SERPL-MCNC: 110 MG/DL (ref 70–110)
GLUCOSE SERPL-MCNC: 207 MG/DL (ref 70–110)
HCO3 UR-SCNC: 30 MMOL/L (ref 24–28)
HCT VFR BLD AUTO: 28.1 % (ref 37–48.5)
HCT VFR BLD CALC: 23 %PCV (ref 36–54)
HGB BLD-MCNC: 9.2 G/DL (ref 12–16)
IMM GRANULOCYTES # BLD AUTO: 0.01 K/UL (ref 0–0.04)
IMM GRANULOCYTES NFR BLD AUTO: 0.1 % (ref 0–0.5)
INR PPP: 1.2 (ref 0.8–1.2)
LYMPHOCYTES # BLD AUTO: 1.3 K/UL (ref 1–4.8)
LYMPHOCYTES NFR BLD: 18.3 % (ref 18–48)
MAGNESIUM SERPL-MCNC: 2.6 MG/DL (ref 1.6–2.6)
MCH RBC QN AUTO: 31.3 PG (ref 27–31)
MCHC RBC AUTO-ENTMCNC: 32.7 G/DL (ref 32–36)
MCV RBC AUTO: 96 FL (ref 82–98)
MONOCYTES # BLD AUTO: 0.7 K/UL (ref 0.3–1)
MONOCYTES NFR BLD: 9.6 % (ref 4–15)
NEUTROPHILS # BLD AUTO: 4.7 K/UL (ref 1.8–7.7)
NEUTROPHILS NFR BLD: 66.8 % (ref 38–73)
NRBC BLD-RTO: 0 /100 WBC
NUM UNITS TRANS FFP: NORMAL
PCO2 BLDA: 67.7 MMHG (ref 35–45)
PH SMN: 7.25 [PH] (ref 7.35–7.45)
PHOSPHATE SERPL-MCNC: 1.9 MG/DL (ref 2.7–4.5)
PLATELET # BLD AUTO: 70 K/UL (ref 150–350)
PMV BLD AUTO: 11.8 FL (ref 9.2–12.9)
PO2 BLDA: 444 MMHG (ref 80–100)
POC BE: 3 MMOL/L
POC IONIZED CALCIUM: 1.01 MMOL/L (ref 1.06–1.42)
POC SATURATED O2: 100 % (ref 95–100)
POC TCO2: 32 MMOL/L (ref 23–27)
POCT GLUCOSE: 118 MG/DL (ref 70–110)
POCT GLUCOSE: 129 MG/DL (ref 70–110)
POCT GLUCOSE: 130 MG/DL (ref 70–110)
POCT GLUCOSE: 146 MG/DL (ref 70–110)
POCT GLUCOSE: 152 MG/DL (ref 70–110)
POTASSIUM BLD-SCNC: 6.8 MMOL/L (ref 3.5–5.1)
POTASSIUM SERPL-SCNC: 3.7 MMOL/L (ref 3.5–5.1)
POTASSIUM SERPL-SCNC: 3.9 MMOL/L (ref 3.5–5.1)
POTASSIUM SERPL-SCNC: 4.1 MMOL/L (ref 3.5–5.1)
PROTHROMBIN TIME: 12.2 SEC (ref 9–12.5)
RBC # BLD AUTO: 2.94 M/UL (ref 4–5.4)
SAMPLE: ABNORMAL
SODIUM BLD-SCNC: 130 MMOL/L (ref 136–145)
SODIUM SERPL-SCNC: 135 MMOL/L (ref 136–145)
TRANS ERYTHROCYTES VOL PATIENT: NORMAL ML
WBC # BLD AUTO: 6.98 K/UL (ref 3.9–12.7)

## 2019-04-29 PROCEDURE — 84132 ASSAY OF SERUM POTASSIUM: CPT | Mod: 91

## 2019-04-29 PROCEDURE — 85025 COMPLETE CBC W/AUTO DIFF WBC: CPT

## 2019-04-29 PROCEDURE — 99232 SBSQ HOSP IP/OBS MODERATE 35: CPT | Mod: ,,, | Performed by: NURSE PRACTITIONER

## 2019-04-29 PROCEDURE — 97535 SELF CARE MNGMENT TRAINING: CPT

## 2019-04-29 PROCEDURE — 25000003 PHARM REV CODE 250: Performed by: STUDENT IN AN ORGANIZED HEALTH CARE EDUCATION/TRAINING PROGRAM

## 2019-04-29 PROCEDURE — 99900035 HC TECH TIME PER 15 MIN (STAT)

## 2019-04-29 PROCEDURE — 94799 UNLISTED PULMONARY SVC/PX: CPT

## 2019-04-29 PROCEDURE — 99232 PR SUBSEQUENT HOSPITAL CARE,LEVL II: ICD-10-PCS | Mod: ,,, | Performed by: NURSE PRACTITIONER

## 2019-04-29 PROCEDURE — 94761 N-INVAS EAR/PLS OXIMETRY MLT: CPT

## 2019-04-29 PROCEDURE — 83735 ASSAY OF MAGNESIUM: CPT

## 2019-04-29 PROCEDURE — 63600175 PHARM REV CODE 636 W HCPCS: Performed by: STUDENT IN AN ORGANIZED HEALTH CARE EDUCATION/TRAINING PROGRAM

## 2019-04-29 PROCEDURE — 99233 PR SUBSEQUENT HOSPITAL CARE,LEVL III: ICD-10-PCS | Mod: GC,,, | Performed by: SURGERY

## 2019-04-29 PROCEDURE — 97116 GAIT TRAINING THERAPY: CPT

## 2019-04-29 PROCEDURE — 80048 BASIC METABOLIC PNL TOTAL CA: CPT

## 2019-04-29 PROCEDURE — 25000003 PHARM REV CODE 250: Performed by: THORACIC SURGERY (CARDIOTHORACIC VASCULAR SURGERY)

## 2019-04-29 PROCEDURE — 20000000 HC ICU ROOM

## 2019-04-29 PROCEDURE — 84132 ASSAY OF SERUM POTASSIUM: CPT

## 2019-04-29 PROCEDURE — 85610 PROTHROMBIN TIME: CPT

## 2019-04-29 PROCEDURE — 85730 THROMBOPLASTIN TIME PARTIAL: CPT

## 2019-04-29 PROCEDURE — 84100 ASSAY OF PHOSPHORUS: CPT

## 2019-04-29 PROCEDURE — 63600175 PHARM REV CODE 636 W HCPCS: Performed by: THORACIC SURGERY (CARDIOTHORACIC VASCULAR SURGERY)

## 2019-04-29 PROCEDURE — 99233 SBSQ HOSP IP/OBS HIGH 50: CPT | Mod: GC,,, | Performed by: SURGERY

## 2019-04-29 RX ORDER — POTASSIUM CHLORIDE 20 MEQ/1
40 TABLET, EXTENDED RELEASE ORAL 2 TIMES DAILY
Status: DISCONTINUED | OUTPATIENT
Start: 2019-04-29 | End: 2019-05-02 | Stop reason: HOSPADM

## 2019-04-29 RX ORDER — POTASSIUM CHLORIDE 20 MEQ/1
40 TABLET, EXTENDED RELEASE ORAL ONCE
Status: COMPLETED | OUTPATIENT
Start: 2019-04-29 | End: 2019-04-29

## 2019-04-29 RX ORDER — ASPIRIN 81 MG/1
81 TABLET ORAL DAILY
Status: DISCONTINUED | OUTPATIENT
Start: 2019-04-29 | End: 2019-05-02 | Stop reason: HOSPADM

## 2019-04-29 RX ORDER — POTASSIUM CHLORIDE 20 MEQ/1
60 TABLET, EXTENDED RELEASE ORAL ONCE
Status: COMPLETED | OUTPATIENT
Start: 2019-04-29 | End: 2019-04-29

## 2019-04-29 RX ORDER — PANTOPRAZOLE SODIUM 40 MG/1
40 TABLET, DELAYED RELEASE ORAL DAILY
Status: DISCONTINUED | OUTPATIENT
Start: 2019-04-29 | End: 2019-04-29

## 2019-04-29 RX ADMIN — ACETAMINOPHEN 650 MG: 325 TABLET ORAL at 01:04

## 2019-04-29 RX ADMIN — SODIUM PHOSPHATE, MONOBASIC, MONOHYDRATE 20.01 MMOL: 276; 142 INJECTION, SOLUTION INTRAVENOUS at 04:04

## 2019-04-29 RX ADMIN — ACETAMINOPHEN 650 MG: 325 TABLET ORAL at 05:04

## 2019-04-29 RX ADMIN — DOCUSATE SODIUM 100 MG: 100 CAPSULE, LIQUID FILLED ORAL at 08:04

## 2019-04-29 RX ADMIN — FUROSEMIDE 40 MG: 10 INJECTION, SOLUTION INTRAMUSCULAR; INTRAVENOUS at 05:04

## 2019-04-29 RX ADMIN — POTASSIUM CHLORIDE 40 MEQ: 1500 TABLET, EXTENDED RELEASE ORAL at 08:04

## 2019-04-29 RX ADMIN — FUROSEMIDE 40 MG: 10 INJECTION, SOLUTION INTRAMUSCULAR; INTRAVENOUS at 08:04

## 2019-04-29 RX ADMIN — OXYCODONE HYDROCHLORIDE 10 MG: 10 TABLET ORAL at 04:04

## 2019-04-29 RX ADMIN — ACETAMINOPHEN 650 MG: 325 TABLET ORAL at 12:04

## 2019-04-29 RX ADMIN — AMIODARONE HYDROCHLORIDE 200 MG: 200 TABLET ORAL at 08:04

## 2019-04-29 RX ADMIN — OXYCODONE HYDROCHLORIDE 10 MG: 10 TABLET ORAL at 07:04

## 2019-04-29 RX ADMIN — PANTOPRAZOLE SODIUM 40 MG: 40 TABLET, DELAYED RELEASE ORAL at 08:04

## 2019-04-29 RX ADMIN — WARFARIN SODIUM 2 MG: 2 TABLET ORAL at 04:04

## 2019-04-29 RX ADMIN — ONDANSETRON 4 MG: 2 INJECTION INTRAMUSCULAR; INTRAVENOUS at 03:04

## 2019-04-29 RX ADMIN — POLYETHYLENE GLYCOL 3350 17 G: 17 POWDER, FOR SOLUTION ORAL at 08:04

## 2019-04-29 RX ADMIN — MUPIROCIN 1 G: 20 OINTMENT TOPICAL at 08:04

## 2019-04-29 RX ADMIN — OXYCODONE HYDROCHLORIDE 10 MG: 10 TABLET ORAL at 01:04

## 2019-04-29 RX ADMIN — HYDROMORPHONE HYDROCHLORIDE 1 MG: 1 INJECTION, SOLUTION INTRAMUSCULAR; INTRAVENOUS; SUBCUTANEOUS at 03:04

## 2019-04-29 RX ADMIN — OXYCODONE HYDROCHLORIDE 10 MG: 10 TABLET ORAL at 10:04

## 2019-04-29 RX ADMIN — ACETAMINOPHEN 650 MG: 325 TABLET ORAL at 06:04

## 2019-04-29 RX ADMIN — POTASSIUM CHLORIDE 60 MEQ: 1500 TABLET, EXTENDED RELEASE ORAL at 03:04

## 2019-04-29 RX ADMIN — OXYCODONE HYDROCHLORIDE 10 MG: 10 TABLET ORAL at 02:04

## 2019-04-29 RX ADMIN — LIDOCAINE 1 PATCH: 50 PATCH TOPICAL at 09:04

## 2019-04-29 RX ADMIN — ASPIRIN 81 MG: 81 TABLET, COATED ORAL at 08:04

## 2019-04-29 NOTE — PHYSICIAN QUERY
"PT Name: Ericka Lewis  MR #: [id number]    Physician Query Form - Hematology Clarification      CDS/: Natalie Antoine RN, CCDS              Contact information: [email or web address]    This form is a permanent document in the medical record.      Query Date: April 29, 2019    By submitting this query, we are merely seeking further clarification of documentation. Please utilize your independent clinical judgment when addressing the question(s) below.    The Medical record contains the following:   Indicators  Supporting Clinical Findings Location in Medical Record    "Anemia" documented     X H & H = 16.1/48.5-->16.5/49.4-->8.1/24.3-->9.8/30.2  9.2/28.7-->9.2/28.1 4/25--4/29 lab    BP =                     HR=      "GI bleeding" documented      Acute bleeding (Non GI site)      Transfusion(s)     X Treatment: CBC -early morning draw 4/26 orders   X Other:   Mitral valve replacement   Left atrial Duran Maze IV procedure with Atricure cryoablation  - Left atrial appendage closure      4/26 op note     Provider, please specify diagnosis or diagnoses associated with above clinical findings.    [x  ] Acute blood loss anemia expected post-operatively       [  ] Other Hematological Diagnosis (please specify):     [  ] Clinically Undetermined       Please document in your progress notes daily for the duration of treatment, until resolved, and include in your discharge summary.                                                                                                      "

## 2019-04-29 NOTE — PT/OT/SLP PROGRESS
Occupational Therapy   Treatment    Name: Ericka Lewis  MRN: [id number]  Admitting Diagnosis:  Mitral stenosis  3 Days Post-Op    Recommendations:     Discharge Recommendations: home(home no needs)  Discharge Equipment Recommendations:  none  Barriers to discharge:  None    Assessment:     Ericka Lewis is a 64 y.o. female with a medical diagnosis of Mitral stenosis.  She presents with decline in ADLs and functional mobility. Pt would benefit from skilled OT services in order to maximize independence with ADLs and facilitate safe discharge. Performance deficits affecting function are weakness, impaired functional mobilty, impaired endurance, impaired self care skills, pain, orthopedic precautions.     Rehab Prognosis:  Good; patient would benefit from acute skilled OT services to address these deficits and reach maximum level of function.       Plan:     Patient to be seen 5 x/week to address the above listed problems via self-care/home management, therapeutic activities, therapeutic exercises  · Plan of Care Expires: 05/12/19  · Plan of Care Reviewed with: patient, spouse    Subjective     Pain/Comfort:  · Pain Rating 1: 7/10  · Location - Side 1: Right  · Location 1: (chest and back)  · Pain Rating Post-Intervention 1: 7/10    Objective:     Communicated with: RN prior to session.  Patient found up in chair with telemetry, pulse ox (continuous), blood pressure cuff, peripheral IV, chest tube upon OT entry to room.    General Precautions: Standard, fall, sternal   Orthopedic Precautions:N/A   Braces: N/A     Occupational Performance:     Functional Mobility/Transfers:  · Patient completed Sit <> Stand Transfer with contact guard assistance  with  no assistive device   · Functional Mobility: Pt ambulated 44 ft in room.     Activities of Daily Living:  · Feeding:  independence    · Grooming: contact guard assistance pt reported to have brushed her teeth. She only requires assist for functional mobility to  sink  · Bathing: DNT due to lines    · Upper Body Dressing: Educated on compensatory dressing strategies with sternal precautions    · Lower Body Dressing: educated on compensatory strategies due to sternal precautions    · Toileting: pt needs CGA for mobility to toilet, she reports she did not need assist and did not have pain with wiping.         [facility] 6 Click ADL: 19    Treatment & Education:  · Pt educated on role of OT in acute care setting.   · Assisted with ADLs and functional mobility with assist levels noted above    Patient left up in chair with all lines intact and call button in reachEducation:      GOALS:   Multidisciplinary Problems     Occupational Therapy Goals        Problem: Occupational Therapy Goal    Goal Priority Disciplines Outcome Interventions   Occupational Therapy Goal     OT, PT/OT Ongoing (interventions implemented as appropriate)    Description:  Goals to be met by: 5/13/19     Patient will increase functional independence with ADLs by performing:    UE Dressing with Modified [place].  LE Dressing with Modified [place].  Grooming while standing at sink with Modified [place].  Toileting from toilet with Modified [place] for hygiene and clothing management.   Bathing from  shower chair/bench with Modified [place].  Toilet transfer to toilet with Modified [place].  Increased functional strength to WFL for B UE.  Upper extremity exercise program x15 reps per handout, with independence.                      Time Tracking:     OT Date of Treatment: 04/29/19  OT Start Time: 1336  OT Stop Time: 1347  OT Total Time (min): 11 min    Billable Minutes:Self Care/Home Management 11    JASMIN Vallejo  4/29/2019

## 2019-04-29 NOTE — PLAN OF CARE
Problem: Adult Inpatient Plan of Care  Goal: Plan of Care Review  Outcome: Ongoing (interventions implemented as appropriate)  Pt AAOx4, on RA, O2 sats >94%. Afebrile, HR bradycardic 48-70 bpm, BP stable. Transfer orders placed, awaiting bed. Insulin infusing @ 0.3 U/hr, accuchecks done AC/HS. Pt voiding spontaneously, ~900 mL output during shift. On cardiac diet, 25% of meals consumed. Oxycodone and dilaudid administered for pain. Pt and family updated on plan of care throughout shift. See flow sheet for assessment and details.

## 2019-04-29 NOTE — PROGRESS NOTES
"[facility]  Endocrinology  Progress Note    Admit Date: 2019     Reason for Consult: Management of Hyperglycemia     Surgical Procedure and Date: Mitral valve replacement 19     Lab Results   Component Value Date    HGBA1C 5.3 2019     HPI:   Patient is a 64 y.o. female with a diagnosis of mitral valve prolapse s/p mitral valve repair (May 2018) and now with severe mitral stenosis, as well as paroxysmal atrial fibrillation.  She is s/p mitral valve replacement on 19. Mother with DM per chart review. Endocrinology consulted for management of hyperglycemia.                Interval HPI:   Overnight events: Remains in SICU. BG well controlled on transition IV insulin infusion. Infusion rate lowered to 0.3 units/hr per protocol.   Eatin% (appetite poor)   Nausea: No  Hypoglycemia and intervention: No  Fever: No  TPN and/or TF: No    BP (!) 111/55 (BP Location: Right arm, Patient Position: Sitting)   Pulse (!) 55   Temp 97.5 °F (36.4 °C) (Oral)   Resp (!) 26   Ht 5' 7" (1.702 m)   Wt 81.3 kg (179 lb 3.7 oz)   SpO2 95%   Breastfeeding? No   BMI 28.07 kg/m²      Labs Reviewed and Include    Recent Labs   Lab 19  0245 19  0803     --    CALCIUM 9.1  --    *  --    K 3.7 4.1   CO2 33*  --    CL 92*  --    BUN 12  --    CREATININE 0.6  --      Lab Results   Component Value Date    WBC 6.98 2019    HGB 9.2 (L) 2019    HCT 28.1 (L) 2019    MCV 96 2019    PLT 70 (L) 2019     No results for input(s): TSH, FREET4 in the last 168 hours.  Lab Results   Component Value Date    HGBA1C 5.3 2019       Nutritional status:   Body mass index is 28.07 kg/m².  Lab Results   Component Value Date    ALBUMIN 4.1 2019    ALBUMIN 3.7 2019     No results found for: PREALBUMIN    Estimated Creatinine Clearance: 106.7 mL/min (based on SCr of 0.6 mg/dL).    Accu-Checks  Recent Labs     19  1210 19  1334 " 04/27/19  1723 04/27/19  2142 04/28/19  0145 04/28/19  0727 04/28/19  1101 04/28/19  1738 04/28/19  2019 04/29/19  0252   POCTGLUCOSE 118* 139* 137* 136* 115* 133* 114* 123* 120* 129*       Current Medications and/or Treatments Impacting Glycemic Control  Immunotherapy:    Immunosuppressants     None        Steroids:   Hormones (From admission, onward)    None        Pressors:    Autonomic Drugs (From admission, onward)    None        Hyperglycemia/Diabetes Medications:   Antihyperglycemics (From admission, onward)    Start     Stop Route Frequency Ordered    04/27/19 1430  insulin regular (Humulin R) 100 Units in sodium chloride 0.9% 100 mL infusion      -- IV Continuous 04/27/19 1330    04/27/19 1429  insulin aspart U-100 pen 0-5 Units      -- SubQ As needed (PRN) 04/27/19 1330          ASSESSMENT and PLAN    * Mitral stenosis  Managed per primary team  Avoid hypoglycemia        Hyperglycemia  BG goal 110-140    Plan:  Transition IV insulin infusion at 0.3 units/hr with stepdown parameters  Low dose correction scale  BG monitoring AC/HS/0200    Discharge planning: TBD        S/P mitral valve repair  Post-op day 3  Managed per primary team              Nany Doan NP  Endocrinology  [facility]

## 2019-04-29 NOTE — PLAN OF CARE
04/29/19 0951   Discharge Assessment   Assessment Type Discharge Planning Reassessment   Discharge Plan A Home with family   Discharge Plan B Home with family;Home Health   Patient/Family in Agreement with Plan yes     Pt is stepping down from ICU today. S/p MVR. The patient will be on coumadin for 3 months. Referring Cardiologist to follow INR:    Dr. Agustin Nunez   [facility]  [address] [place] [address]  [phone]    PCP:  Екатерина Georges PA-C (Nov. 28, 2017November 28, 2017 - Present)  [phone] (Work)  [phone] (Fax)  [phone]    PT/OT following no home health needed. Will contact Dr. Nunez's office to discuss the need for coumadin monitoring. Discharge summary, operative note, and H&P to be faxed upon discharge. Will discuss the plan with patient and caregiver to confirm the location for Dr. Nunez as [facility] location vs [place].

## 2019-04-29 NOTE — SUBJECTIVE & OBJECTIVE
Interval History/Significant Events: NAEON. Pt is up out of bed and in the chair.  Reports that she is tolerating PO without issue. Pain is well controlled.     Follow-up For: Procedure(s) (LRB):  REPLACEMENT, MITRAL VALVE (N/A)  COMPLETE MAZE PROCEDURE USING CRYOBLATION (N/A)  \REPAIR , LEFT ATRIAL APPENDAGE,  (Left)    Post-Operative Day: 3 Days Post-Op    Objective:     Vital Signs (Most Recent):  Temp: 98.4 °F (36.9 °C) (04/29/19 0300)  Pulse: (!) 48 (04/29/19 0615)  Resp: 12 (04/29/19 0615)  BP: 99/69 (04/29/19 0600)  SpO2: 100 % (04/29/19 0615) Vital Signs (24h Range):  Temp:  [98.2 °F (36.8 °C)-98.5 °F (36.9 °C)] 98.4 °F (36.9 °C)  Pulse:  [39-69] 48  Resp:  [12-45] 12  SpO2:  [91 %-100 %] 100 %  BP: ()/(52-69) 99/69  Arterial Line BP: ()/(38-76) 87/62     Weight: 81.3 kg (179 lb 3.7 oz)  Body mass index is 28.07 kg/m².      Intake/Output Summary (Last 24 hours) at 4/29/2019 0651  Last data filed at 4/29/2019 0600  Gross per 24 hour   Intake 1481 ml   Output 3132 ml   Net -1651 ml       Physical Exam   Constitutional: She is oriented to person, place, and time. She appears well-developed and well-nourished. No distress.   HENT:   Head: Normocephalic and atraumatic.   Eyes: Pupils are equal, round, and reactive to light. EOM are normal.   Neck: Normal range of motion. Neck supple.   Cardiovascular: Regular rhythm. Bradycardia present.   +pacer wires; +chest tubes   Pulmonary/Chest: Effort normal and breath sounds normal. No respiratory distress.   Abdominal: Soft. She exhibits no distension.   Musculoskeletal: Normal range of motion.   Neurological: She is alert and oriented to person, place, and time.   Skin: Skin is warm and dry.       Vents:  Oxygen Concentration (%): 28 (04/27/19 0348)    Lines/Drains/Airways     Drain                 Y Chest Tube 1 and 2 04/26/19 1354 1 Anterior Mediastinal 19 Fr. Posterior Mediastinal 19 Fr. 2 days         Y Chest Tube 3 and 4 04/26/19 1355 Right Pleural 19  Fr. 4 Left Pleural 19 Fr. 2 days          Line                 Pacer Wires 04/26/19 1340 2 days          Peripheral Intravenous Line                 Peripheral IV - Single Lumen 04/27/19 1348 22 G Left Hand 1 day         Peripheral IV - Single Lumen 04/28/19 1040 20 G Left Antecubital less than 1 day                Significant Labs:    CBC/Anemia Profile:  Recent Labs   Lab 04/27/19  0822 04/28/19  0233 04/29/19  0245   WBC  --  6.24 6.98   HGB  --  9.2* 9.2*   HCT 29* 28.7* 28.1*   PLT  --  60* 70*   MCV  --  96 96   RDW  --  12.7 12.5        Chemistries:  Recent Labs   Lab 04/28/19  0233  04/28/19  1910 04/28/19  2250 04/29/19  0245   *  --   --   --  135*   K 4.0   < > 2.9* 3.4* 3.7   CL 98  --   --   --  92*   CO2 28  --   --   --  33*   BUN 17  --   --   --  12   CREATININE 0.7  --   --   --  0.6   CALCIUM 8.7  --   --   --  9.1   MG 2.1  --   --  1.6 2.6   PHOS 1.7*  --   --   --  1.9*    < > = values in this interval not displayed.       All pertinent labs within the past 24 hours have been reviewed.    Significant Imaging:  I have reviewed and interpreted all pertinent imaging results/findings within the past 24 hours.

## 2019-04-29 NOTE — PLAN OF CARE
Problem: Occupational Therapy Goal  Goal: Occupational Therapy Goal  Goals to be met by: 5/13/19     Patient will increase functional independence with ADLs by performing:    UE Dressing with Modified [place].  LE Dressing with Modified [place].  Grooming while standing at sink with Modified [place].  Toileting from toilet with Modified [place] for hygiene and clothing management.   Bathing from  shower chair/bench with Modified [place].  Toilet transfer to toilet with Modified [place].  Increased functional strength to WFL for B UE.  Upper extremity exercise program x15 reps per handout, with independence.     Outcome: Ongoing (interventions implemented as appropriate)  Goals remain appropriate, continue with OT POC.    JASMIN Carver  4/29/2019  Rehab Services

## 2019-04-29 NOTE — ASSESSMENT & PLAN NOTE
Ericka Lewis is a 64 y.o. female s/p redo Mitral Valve Repair on 4/26 with Dr. Horowitz.     Neuro:   - PO pain meds PRN  - PRN dilaudid      Pulmonary:   - 2 L NC, wean as tolerated, pulm toilet  - IS  - d/c Chest tubes     Cardiac:   Epi off, normotensive, but with HR 40 -60  - Backup pacer off  - Coumadin 2 scheduled   - Amio 200 BID  - Holding BB due to bradycardia     Renal:    - BUN/Cr normal preop  - Lasix 40 BID     ID:   - Periop ancef     Hem/Onc:   -Stable H/H  -Protamine given 4/26 for rebound heparin action     Endocrine:    - Insulin drip     Fluids/Electrolytes/Nutrition/GI:   - Replace electrolytes PRN  - cardiac diet     PPx:  - DVT: SCDs  - Bowel: PPI     DISPO:  - Continue SICU care. D/c drains today. Stepdown to floor.

## 2019-04-29 NOTE — PROGRESS NOTES
[facility]  Critical Care - Surgery  Progress Note    Patient Name: Ericka Lewis  MRN: [id number]  Admission Date: 4/21/2019  Hospital Length of Stay: 8 days  Code Status: Prior  Attending Provider: Yonatan Mcintosh MD  Primary Care Provider: Primary Doctor No   Principal Problem: Mitral stenosis    Subjective:     Hospital/ICU Course:  No notes on file    Interval History/Significant Events: NAEON. Pt is up out of bed and in the chair.  Reports that she is tolerating PO without issue. Pain is well controlled.     Follow-up For: Procedure(s) (LRB):  REPLACEMENT, MITRAL VALVE (N/A)  COMPLETE MAZE PROCEDURE USING CRYOBLATION (N/A)  \REPAIR , LEFT ATRIAL APPENDAGE,  (Left)    Post-Operative Day: 3 Days Post-Op    Objective:     Vital Signs (Most Recent):  Temp: 98.4 °F (36.9 °C) (04/29/19 0300)  Pulse: (!) 48 (04/29/19 0615)  Resp: 12 (04/29/19 0615)  BP: 99/69 (04/29/19 0600)  SpO2: 100 % (04/29/19 0615) Vital Signs (24h Range):  Temp:  [98.2 °F (36.8 °C)-98.5 °F (36.9 °C)] 98.4 °F (36.9 °C)  Pulse:  [39-69] 48  Resp:  [12-45] 12  SpO2:  [91 %-100 %] 100 %  BP: ()/(52-69) 99/69  Arterial Line BP: ()/(38-76) 87/62     Weight: 81.3 kg (179 lb 3.7 oz)  Body mass index is 28.07 kg/m².      Intake/Output Summary (Last 24 hours) at 4/29/2019 0651  Last data filed at 4/29/2019 0600  Gross per 24 hour   Intake 1481 ml   Output 3132 ml   Net -1651 ml       Physical Exam   Constitutional: She is oriented to person, place, and time. She appears well-developed and well-nourished. No distress.   HENT:   Head: Normocephalic and atraumatic.   Eyes: Pupils are equal, round, and reactive to light. EOM are normal.   Neck: Normal range of motion. Neck supple.   Cardiovascular: Regular rhythm. Bradycardia present.   +pacer wires; +chest tubes   Pulmonary/Chest: Effort normal and breath sounds normal. No respiratory distress.   Abdominal: Soft. She exhibits no distension.   Musculoskeletal: Normal range  of motion.   Neurological: She is alert and oriented to person, place, and time.   Skin: Skin is warm and dry.       Vents:  Oxygen Concentration (%): 28 (04/27/19 0348)    Lines/Drains/Airways     Drain                 Y Chest Tube 1 and 2 04/26/19 1354 1 Anterior Mediastinal 19 Fr. Posterior Mediastinal 19 Fr. 2 days         Y Chest Tube 3 and 4 04/26/19 1355 Right Pleural 19 Fr. 4 Left Pleural 19 Fr. 2 days          Line                 Pacer Wires 04/26/19 1340 2 days          Peripheral Intravenous Line                 Peripheral IV - Single Lumen 04/27/19 1348 22 G Left Hand 1 day         Peripheral IV - Single Lumen 04/28/19 1040 20 G Left Antecubital less than 1 day                Significant Labs:    CBC/Anemia Profile:  Recent Labs   Lab 04/27/19  0822 04/28/19  0233 04/29/19  0245   WBC  --  6.24 6.98   HGB  --  9.2* 9.2*   HCT 29* 28.7* 28.1*   PLT  --  60* 70*   MCV  --  96 96   RDW  --  12.7 12.5        Chemistries:  Recent Labs   Lab 04/28/19  0233  04/28/19  1910 04/28/19  2250 04/29/19  0245   *  --   --   --  135*   K 4.0   < > 2.9* 3.4* 3.7   CL 98  --   --   --  92*   CO2 28  --   --   --  33*   BUN 17  --   --   --  12   CREATININE 0.7  --   --   --  0.6   CALCIUM 8.7  --   --   --  9.1   MG 2.1  --   --  1.6 2.6   PHOS 1.7*  --   --   --  1.9*    < > = values in this interval not displayed.       All pertinent labs within the past 24 hours have been reviewed.    Significant Imaging:  I have reviewed and interpreted all pertinent imaging results/findings within the past 24 hours.    Assessment/Plan:     S/P mitral valve repair  Ericka Lewis is a 64 y.o. female s/p redo Mitral Valve Repair on 4/26 with Dr. Horowitz.  She was brought up to SICU extubated and on epi, but quickly needed BIPAP.    Neuro:   - PO pain meds PRN     Pulmonary:   - Satting well on 2 L NC, wean as tolerated, pulm toilet    Cardiac:   - HDS off pressors  - Intermittently bradycardic   - Amiodarone decreased to 200  mg BID  - Lasix 20 g IV BID  -  mg QD  - Warfain 2 mg QD    Renal:    - BUN/Cr normal preop  - Remain WNL; cont to monitor  - UOP 2.7 L over 24 hours    ID:   - Periop ancef  - WBC wnl; afebrile    Hem/Onc:   - Stable H/H  - Warfarin 2 mg QD; INR 1.2      Endocrine:    - SSI   - Cont to monitor glucose    Fluids/Electrolytes/Nutrition/GI:   - Replace electrolytes PRN    PPx:  - DVT: ASA, warfarin  - Bowel: PPI    DISPO:  - Recommend stepdown as pt is stable          Critical care was time spent personally by me on the following activities: development of treatment plan with patient or surrogate and bedside caregivers, discussions with consultants, evaluation of patient's response to treatment, examination of patient, ordering and performing treatments and interventions, ordering and review of laboratory studies, ordering and review of radiographic studies, pulse oximetry, re-evaluation of patient's condition.  This critical care time did not overlap with that of any other provider or involve time for any procedures.     Antoinette Winslow DO  Critical Care - Surgery  [facility]

## 2019-04-29 NOTE — PROGRESS NOTES
[facility]  Cardiothoracic Surgery  Progress Note    Patient Name: Ericka Lewis  MRN: [id number]  Admission Date: 4/21/2019  Hospital Length of Stay: 8 days  Code Status: Prior   Attending Physician: Yonatan Mcintosh MD   Referring Provider: Yonatan Mcintosh MD  Principal Problem:Mitral stenosis      Subjective:     Post-Op Info:  Procedure(s) (LRB):  REPLACEMENT, MITRAL VALVE (N/A)  COMPLETE MAZE PROCEDURE USING CRYOBLATION (N/A)  \REPAIR , LEFT ATRIAL APPENDAGE,  (Left)   3 Days Post-Op     Interval History:   3 Days Post-Op  NAEON. AF. MAPs 57 - 90.  Chest tube output decreasing.  Cont to have Bradycardia, HR 40 - 60       Medications:  Continuous Infusions:   insulin (HUMAN R) infusion (adults) 0.3 Units/hr (04/28/19 1900)     Scheduled Meds:   acetaminophen  650 mg Oral Q6H    amiodarone  200 mg Oral BID    aspirin  81 mg Oral Daily    docusate sodium  100 mg Oral BID    furosemide  40 mg Intravenous BID    lidocaine  1 patch Transdermal Q24H    mupirocin  1 g Nasal BID    pantoprazole  40 mg Oral Daily    polyethylene glycol  17 g Oral Daily    potassium chloride  40 mEq Oral BID    warfarin  2 mg Oral Daily     PRN Meds:albumin human 5%, bisacodyl, dextrose 50%, dextrose 50%, glucagon (human recombinant), glucose, glucose, HYDROmorphone, insulin aspart U-100, k phos di & mono-sod phos mono, magnesium sulfate IVPB, magnesium sulfate IVPB, metoclopramide HCl, ondansetron, oxyCODONE, oxyCODONE, potassium chloride in water **AND** potassium chloride in water **AND** potassium chloride in water, sodium chloride 0.9%, sodium phosphate IVPB, sodium phosphate IVPB, sodium phosphate IVPB     Objective:     Vital Signs (Most Recent):  Temp: 97.5 °F (36.4 °C) (04/29/19 0700)  Pulse: (!) 46 (04/29/19 0700)  Resp: 17 (04/29/19 0700)  BP: (!) 89/54 (04/29/19 0700)  SpO2: 100 % (04/29/19 0700) Vital Signs (24h Range):  Temp:  [97.5 °F (36.4 °C)-98.4 °F (36.9 °C)] 97.5 °F (36.4  °C)  Pulse:  [39-69] 46  Resp:  [12-45] 17  SpO2:  [91 %-100 %] 100 %  BP: ()/(52-69) 89/54  Arterial Line BP: ()/(38-76) 87/62     Weight: 81.3 kg (179 lb 3.7 oz)  Body mass index is 28.07 kg/m².    SpO2: 100 %  O2 Device (Oxygen Therapy): nasal cannula    Intake/Output - Last 3 Shifts       04/27 0700 - 04/28 0659 04/28 0700 - 04/29 0659 04/29 0700 - 04/30 0659    P.O. 870 920     I.V. (mL/kg) 374 (4.6) 561 (6.9)     Blood       IV Piggyback       Total Intake(mL/kg) 1244 (15.3) 1481 (18.2)     Urine (mL/kg/hr) 1419 (0.7) 2782 (1.4)     Chest Tube 310 350 60    Total Output 1729 3132 60    Net -485 -1651 -60                 Lines/Drains/Airways     Drain                 Y Chest Tube 1 and 2 04/26/19 1354 1 Anterior Mediastinal 19 Fr. Posterior Mediastinal 19 Fr. 2 days         Y Chest Tube 3 and 4 04/26/19 1355 Right Pleural 19 Fr. 4 Left Pleural 19 Fr. 2 days          Line                 Pacer Wires 04/26/19 1340 2 days          Peripheral Intravenous Line                 Peripheral IV - Single Lumen 04/27/19 1348 22 G Left Hand 1 day         Peripheral IV - Single Lumen 04/28/19 1040 20 G Left Antecubital less than 1 day                Physical Exam    A&O, NAD  RRR  No Resp distress 2L NC  Chest with dressing c/d/i  Chest tubes with SS output  Ext warm    Significant Labs:  CBC:   Recent Labs   Lab 04/29/19  0245   WBC 6.98   RBC 2.94*   HGB 9.2*   HCT 28.1*   PLT 70*   MCV 96   MCH 31.3*   MCHC 32.7     CMP:   Recent Labs   Lab 04/29/19  0245      CALCIUM 9.1   *   K 3.7   CO2 33*   CL 92*   BUN 12   CREATININE 0.6     Coagulation:   Recent Labs   Lab 04/29/19  0245   INR 1.2   APTT 30.7     All pertinent labs from the last 24 hours have been reviewed.    Significant Diagnostics:  CXR: I have reviewed all pertinent results/findings within the past 24 hours    Assessment/Plan:     * Mitral stenosis  Ericka Lewis is a 64 y.o. female s/p redo Mitral Valve Repair on 4/26 with   Lor.     Neuro:   - PO pain meds PRN  - PRN dilaudid      Pulmonary:   - 2 L NC, wean as tolerated, pulm toilet  - IS  - d/c Chest tubes     Cardiac:   Epi off, normotensive, but with HR 40 -60  - Backup pacer off  - Coumadin 2 scheduled   - Amio 200 BID  - Holding BB due to bradycardia     Renal:    - BUN/Cr normal preop  - Lasix 40 BID     ID:   - Periop ancef     Hem/Onc:   -Stable H/H  -Protamine given 4/26 for rebound heparin action     Endocrine:    - Insulin drip     Fluids/Electrolytes/Nutrition/GI:   - Replace electrolytes PRN  - cardiac diet     PPx:  - DVT: SCDs  - Bowel: PPI     DISPO:  - Continue SICU care. D/c drains today. Stepdown to floor.              Gerson Osman MD  Cardiothoracic Surgery  [facility]

## 2019-04-29 NOTE — PHYSICIAN QUERY
PT Name: Ericka Lewis  MR #: [id number]     PHYSICIAN QUERY -  ELECTROLYTE CLARIFICATION      CDS/: Natalie Antoine RN, CCDS             Contact information: [email or web address]  This form is a permanent document in the medical record.     Query Date: April 29, 2019    By submitting this query, we are merely seeking further clarification of documentation to reflect the severity of illness of your patient. Please utilize your independent clinical judgment when addressing the question(s) below.    The Medical record reflects the following:     Indicators   Supporting Clinical Findings Location in Medical Record   X Lab Value(s) K+ 2.9-->3.4  Phos 1.9 4/28 lab  4/29 lab   X Treatment                                 Medication KCL 10 meq IVPB x1  KCL 20 meq PO x1    Sodium Phosphate 20 mmol IVPB x1 4/28 lab      4/29 mar    Other       Provider, please specify the diagnosis or diagnoses that correspond(s) to the above indicators. Helio all that apply:    [ x  ] Hypokalemia   [ x  ] Hypophosphatemia   [   ] Other electrolyte disturbance (please specify): _______   [   ]  Clinically Undetermined       Please document in your progress notes daily for the duration of treatment until resolved, and include in your discharge summary.

## 2019-04-29 NOTE — ASSESSMENT & PLAN NOTE
Ericka Lewis is a 64 y.o. female s/p redo Mitral Valve Repair on 4/26 with Dr. Horowitz.  She was brought up to SICU extubated and on epi, but quickly needed BIPAP.    Neuro:   - PO pain meds PRN     Pulmonary:   - Satting well on 2 L NC, wean as tolerated, pulm toilet    Cardiac:   - HDS off pressors  - Intermittently bradycardic   - Amiodarone decreased to 200 mg BID  - Lasix 20 g IV BID  -  mg QD  - Warfain 2 mg QD    Renal:    - BUN/Cr normal preop  - Remain WNL; cont to monitor  - UOP 2.7 L over 24 hours    ID:   - Periop ancef  - WBC wnl; afebrile    Hem/Onc:   - Stable H/H  - Warfarin 2 mg QD; INR 1.2      Endocrine:    - SSI   - Cont to monitor glucose    Fluids/Electrolytes/Nutrition/GI:   - Replace electrolytes PRN    PPx:  - DVT: ASA, warfarin  - Bowel: PPI    DISPO:  - Recommend stepdown as pt is stable

## 2019-04-29 NOTE — SUBJECTIVE & OBJECTIVE
Interval History:   3 Days Post-Op  NATE. AF. MAPs 57 - 90.  Chest tube output decreasing.  Cont to have Bradycardia, HR 40 - 60       Medications:  Continuous Infusions:   insulin (HUMAN R) infusion (adults) 0.3 Units/hr (04/28/19 1900)     Scheduled Meds:   acetaminophen  650 mg Oral Q6H    amiodarone  200 mg Oral BID    aspirin  81 mg Oral Daily    docusate sodium  100 mg Oral BID    furosemide  40 mg Intravenous BID    lidocaine  1 patch Transdermal Q24H    mupirocin  1 g Nasal BID    pantoprazole  40 mg Oral Daily    polyethylene glycol  17 g Oral Daily    potassium chloride  40 mEq Oral BID    warfarin  2 mg Oral Daily     PRN Meds:albumin human 5%, bisacodyl, dextrose 50%, dextrose 50%, glucagon (human recombinant), glucose, glucose, HYDROmorphone, insulin aspart U-100, k phos di & mono-sod phos mono, magnesium sulfate IVPB, magnesium sulfate IVPB, metoclopramide HCl, ondansetron, oxyCODONE, oxyCODONE, potassium chloride in water **AND** potassium chloride in water **AND** potassium chloride in water, sodium chloride 0.9%, sodium phosphate IVPB, sodium phosphate IVPB, sodium phosphate IVPB     Objective:     Vital Signs (Most Recent):  Temp: 97.5 °F (36.4 °C) (04/29/19 0700)  Pulse: (!) 46 (04/29/19 0700)  Resp: 17 (04/29/19 0700)  BP: (!) 89/54 (04/29/19 0700)  SpO2: 100 % (04/29/19 0700) Vital Signs (24h Range):  Temp:  [97.5 °F (36.4 °C)-98.4 °F (36.9 °C)] 97.5 °F (36.4 °C)  Pulse:  [39-69] 46  Resp:  [12-45] 17  SpO2:  [91 %-100 %] 100 %  BP: ()/(52-69) 89/54  Arterial Line BP: ()/(38-76) 87/62     Weight: 81.3 kg (179 lb 3.7 oz)  Body mass index is 28.07 kg/m².    SpO2: 100 %  O2 Device (Oxygen Therapy): nasal cannula    Intake/Output - Last 3 Shifts       04/27 0700 - 04/28 0659 04/28 0700 - 04/29 0659 04/29 0700 - 04/30 0659    P.O. 870 920     I.V. (mL/kg) 374 (4.6) 561 (6.9)     Blood       IV Piggyback       Total Intake(mL/kg) 1244 (15.3) 1481 (18.2)     Urine (mL/kg/hr)  1419 (0.7) 2782 (1.4)     Chest Tube 310 350 60    Total Output 1729 3132 60    Net -485 -1651 -60                 Lines/Drains/Airways     Drain                 Y Chest Tube 1 and 2 04/26/19 1354 1 Anterior Mediastinal 19 Fr. Posterior Mediastinal 19 Fr. 2 days         Y Chest Tube 3 and 4 04/26/19 1355 Right Pleural 19 Fr. 4 Left Pleural 19 Fr. 2 days          Line                 Pacer Wires 04/26/19 1340 2 days          Peripheral Intravenous Line                 Peripheral IV - Single Lumen 04/27/19 1348 22 G Left Hand 1 day         Peripheral IV - Single Lumen 04/28/19 1040 20 G Left Antecubital less than 1 day                Physical Exam    A&O, NAD  RRR  No Resp distress 2L NC  Chest with dressing c/d/i  Chest tubes with SS output  Ext warm    Significant Labs:  CBC:   Recent Labs   Lab 04/29/19  0245   WBC 6.98   RBC 2.94*   HGB 9.2*   HCT 28.1*   PLT 70*   MCV 96   MCH 31.3*   MCHC 32.7     CMP:   Recent Labs   Lab 04/29/19  0245      CALCIUM 9.1   *   K 3.7   CO2 33*   CL 92*   BUN 12   CREATININE 0.6     Coagulation:   Recent Labs   Lab 04/29/19  0245   INR 1.2   APTT 30.7     All pertinent labs from the last 24 hours have been reviewed.    Significant Diagnostics:  CXR: I have reviewed all pertinent results/findings within the past 24 hours

## 2019-04-29 NOTE — PLAN OF CARE
Problem: Adult Inpatient Plan of Care  Goal: Plan of Care Review  Outcome: Ongoing (interventions implemented as appropriate)  POC reviewed with patient who verbalized understanding. Pt tolerating diet well, no nausea/vomiting. Heart rate ranging from 42-60bpm, MD aware. Pain being controlled with PRN pain medication. Urine  output has been adequate. Insulin infusing. Chest tubes minimal output. Electrolytes replaced during the night and AM. Pt remained free from falls throughout the shift VSS. No distress noted, will continue to monitor.

## 2019-04-29 NOTE — PLAN OF CARE
Problem: Physical Therapy Goal  Goal: Physical Therapy Goal  Goals to be met by: 19    Patient will increase functional independence with mobility by performin. Supine to sit with Stand-by Assistance -not met  2. Sit to stand transfer with Supervision -not met  3. Gait  x 250 feet with Supervision -not met   4. Ascend/descend 4 stair with bilateral Handrails Supervision -not met     Goals remain appropriate. Liz Jaime, PT 2019

## 2019-04-29 NOTE — ASSESSMENT & PLAN NOTE
BG goal 110-140    Plan:  Transition IV insulin infusion at 0.3 units/hr with stepdown parameters  Low dose correction scale  BG monitoring AC/HS/0200    Discharge planning: TBD

## 2019-04-29 NOTE — PT/OT/SLP PROGRESS
Physical Therapy Treatment    Patient Name:  Ericka Lewis   MRN:  [id number]    Recommendations:     Discharge Recommendations:  (home no needs)   Discharge Equipment Recommendations: none   Barriers to discharge: None    Assessment:     Ericka Lewis is a 64 y.o. female admitted with a medical diagnosis of Mitral stenosis.  She presents with the following impairments/functional limitations:  weakness, gait instability, impaired balance, impaired endurance, impaired functional mobilty, decreased lower extremity function, pain pt raheel treatment better being able to gait train farther. Pt will benefit from cont skilled PT 5x/wk to progress physically. Pt will be able to discharge home with no therapy or DME needs. Pt is s/p MVR, MAZE, L atrial appendage closure 4/26/19.    Rehab Prognosis: Good; patient would benefit from acute skilled PT services to address these deficits and reach maximum level of function.    Recent Surgery: Procedure(s) (LRB):  REPLACEMENT, MITRAL VALVE (N/A)  COMPLETE MAZE PROCEDURE USING CRYOBLATION (N/A)  \REPAIR , LEFT ATRIAL APPENDAGE,  (Left) 3 Days Post-Op    Plan:     During this hospitalization, patient to be seen 5 x/week to address the identified rehab impairments via gait training, therapeutic activities and progress toward the following goals:    · Plan of Care Expires:  05/25/19    Subjective     Chief Complaint: pt c/o pain during treatment.   Patient/Family Comments/goals:  To get better and go home.   Pain/Comfort:  · Pain Rating 1: 7/10  · Location 1: (chest and back)  · Pain Rating Post-Intervention 1: 7/10      Objective:     Communicated with nurse prior to session.  Patient found up in chair with telemetry, pulse ox (continuous), blood pressure cuff, peripheral IV, chest tube upon PT entry to room.     General Precautions: Standard, fall, sternal   Orthopedic Precautions:    Braces:       Functional Mobility:  · Transfers:  Pt needed verbal cues for functional mobility with  sternal precautions.    · Sit to Stand:  contact guard assistance with hand-held assist  ·   · Gait: pt received gait training ~ 44 ft in room with CGA.       AM-PAC 6 CLICK MOBILITY  Turning over in bed (including adjusting bedclothes, sheets and blankets)?: 3  Sitting down on and standing up from a chair with arms (e.g., wheelchair, bedside commode, etc.): 3  Moving from lying on back to sitting on the side of the bed?: 3  Moving to and from a bed to a chair (including a wheelchair)?: 3  Need to walk in hospital room?: 3  Climbing 3-5 steps with a railing?: 3  Basic Mobility Total Score: 18         Patient left up in chair with all lines intact, call button in reach and  present..    GOALS:   Multidisciplinary Problems     Physical Therapy Goals        Problem: Physical Therapy Goal    Goal Priority Disciplines Outcome Goal Variances Interventions   Physical Therapy Goal     PT, PT/OT      Description:  Goals to be met by: 19    Patient will increase functional independence with mobility by performin. Supine to sit with Stand-by Assistance -not met  2. Sit to stand transfer with Supervision -not met  3. Gait  x 250 feet with Supervision -not met   4. Ascend/descend 4 stair with bilateral Handrails Supervision -not met                      Time Tracking:     PT Received On: 19  PT Start Time: 1340     PT Stop Time: 1350  PT Total Time (min): 10 min     Billable Minutes: Gait Training 10 min    Treatment Type: Treatment  PT/PTA: PT     PTA Visit Number: 0     Liz Jaime, PT  2019

## 2019-04-29 NOTE — SUBJECTIVE & OBJECTIVE
"Interval HPI:   Overnight events: Remains in SICU. BG well controlled on transition IV insulin infusion. Infusion rate lowered to 0.3 units/hr per protocol.   Eatin% (appetite poor)   Nausea: No  Hypoglycemia and intervention: No  Fever: No  TPN and/or TF: No    BP (!) 111/55 (BP Location: Right arm, Patient Position: Sitting)   Pulse (!) 55   Temp 97.5 °F (36.4 °C) (Oral)   Resp (!) 26   Ht 5' 7" (1.702 m)   Wt 81.3 kg (179 lb 3.7 oz)   SpO2 95%   Breastfeeding? No   BMI 28.07 kg/m²     Labs Reviewed and Include    Recent Labs   Lab 19  0245 19  0803     --    CALCIUM 9.1  --    *  --    K 3.7 4.1   CO2 33*  --    CL 92*  --    BUN 12  --    CREATININE 0.6  --      Lab Results   Component Value Date    WBC 6.98 2019    HGB 9.2 (L) 2019    HCT 28.1 (L) 2019    MCV 96 2019    PLT 70 (L) 2019     No results for input(s): TSH, FREET4 in the last 168 hours.  Lab Results   Component Value Date    HGBA1C 5.3 2019       Nutritional status:   Body mass index is 28.07 kg/m².  Lab Results   Component Value Date    ALBUMIN 4.1 2019    ALBUMIN 3.7 2019     No results found for: PREALBUMIN    Estimated Creatinine Clearance: 106.7 mL/min (based on SCr of 0.6 mg/dL).    Accu-Checks  Recent Labs     19  1210 19  1334 19  1723 19  2142 19  0145 19  0727 19  1101 19  1738 19  2019 19  0252   POCTGLUCOSE 118* 139* 137* 136* 115* 133* 114* 123* 120* 129*       Current Medications and/or Treatments Impacting Glycemic Control  Immunotherapy:    Immunosuppressants     None        Steroids:   Hormones (From admission, onward)    None        Pressors:    Autonomic Drugs (From admission, onward)    None        Hyperglycemia/Diabetes Medications:   Antihyperglycemics (From admission, onward)    Start     Stop Route Frequency Ordered    19 1430  insulin regular (Humulin R) 100 Units in sodium " chloride 0.9% 100 mL infusion      -- IV Continuous 04/27/19 1330    04/27/19 1429  insulin aspart U-100 pen 0-5 Units      -- SubQ As needed (PRN) 04/27/19 6792

## 2019-04-30 LAB
ANION GAP SERPL CALC-SCNC: 10 MMOL/L (ref 8–16)
APTT BLDCRRT: 29.7 SEC (ref 21–32)
BASOPHILS # BLD AUTO: 0.03 K/UL (ref 0–0.2)
BASOPHILS NFR BLD: 0.4 % (ref 0–1.9)
BUN SERPL-MCNC: 13 MG/DL (ref 8–23)
CALCIUM SERPL-MCNC: 9.3 MG/DL (ref 8.7–10.5)
CHLORIDE SERPL-SCNC: 94 MMOL/L (ref 95–110)
CO2 SERPL-SCNC: 30 MMOL/L (ref 23–29)
CREAT SERPL-MCNC: 0.7 MG/DL (ref 0.5–1.4)
DIFFERENTIAL METHOD: ABNORMAL
EOSINOPHIL # BLD AUTO: 0.4 K/UL (ref 0–0.5)
EOSINOPHIL NFR BLD: 5.7 % (ref 0–8)
ERYTHROCYTE [DISTWIDTH] IN BLOOD BY AUTOMATED COUNT: 12.6 % (ref 11.5–14.5)
EST. GFR  (AFRICAN AMERICAN): >60 ML/MIN/1.73 M^2
EST. GFR  (NON AFRICAN AMERICAN): >60 ML/MIN/1.73 M^2
GLUCOSE SERPL-MCNC: 110 MG/DL (ref 70–110)
HCT VFR BLD AUTO: 29.9 % (ref 37–48.5)
HGB BLD-MCNC: 9.6 G/DL (ref 12–16)
IMM GRANULOCYTES # BLD AUTO: 0.02 K/UL (ref 0–0.04)
IMM GRANULOCYTES NFR BLD AUTO: 0.3 % (ref 0–0.5)
INR PPP: 1.6 (ref 0.8–1.2)
LYMPHOCYTES # BLD AUTO: 1.2 K/UL (ref 1–4.8)
LYMPHOCYTES NFR BLD: 17.1 % (ref 18–48)
MAGNESIUM SERPL-MCNC: 2 MG/DL (ref 1.6–2.6)
MCH RBC QN AUTO: 30.9 PG (ref 27–31)
MCHC RBC AUTO-ENTMCNC: 32.1 G/DL (ref 32–36)
MCV RBC AUTO: 96 FL (ref 82–98)
MONOCYTES # BLD AUTO: 0.7 K/UL (ref 0.3–1)
MONOCYTES NFR BLD: 9.7 % (ref 4–15)
NEUTROPHILS # BLD AUTO: 4.6 K/UL (ref 1.8–7.7)
NEUTROPHILS NFR BLD: 66.8 % (ref 38–73)
NRBC BLD-RTO: 0 /100 WBC
PHOSPHATE SERPL-MCNC: 2 MG/DL (ref 2.7–4.5)
PLATELET # BLD AUTO: 131 K/UL (ref 150–350)
PMV BLD AUTO: 11.1 FL (ref 9.2–12.9)
POCT GLUCOSE: 110 MG/DL (ref 70–110)
POCT GLUCOSE: 113 MG/DL (ref 70–110)
POCT GLUCOSE: 132 MG/DL (ref 70–110)
POCT GLUCOSE: 142 MG/DL (ref 70–110)
POTASSIUM SERPL-SCNC: 4.5 MMOL/L (ref 3.5–5.1)
PROTHROMBIN TIME: 15.3 SEC (ref 9–12.5)
RBC # BLD AUTO: 3.11 M/UL (ref 4–5.4)
SODIUM SERPL-SCNC: 134 MMOL/L (ref 136–145)
WBC # BLD AUTO: 6.89 K/UL (ref 3.9–12.7)

## 2019-04-30 PROCEDURE — 97116 GAIT TRAINING THERAPY: CPT

## 2019-04-30 PROCEDURE — 83735 ASSAY OF MAGNESIUM: CPT

## 2019-04-30 PROCEDURE — 99233 PR SUBSEQUENT HOSPITAL CARE,LEVL III: ICD-10-PCS | Mod: 24,GC,, | Performed by: SURGERY

## 2019-04-30 PROCEDURE — 94799 UNLISTED PULMONARY SVC/PX: CPT

## 2019-04-30 PROCEDURE — 25000003 PHARM REV CODE 250: Performed by: STUDENT IN AN ORGANIZED HEALTH CARE EDUCATION/TRAINING PROGRAM

## 2019-04-30 PROCEDURE — 99232 PR SUBSEQUENT HOSPITAL CARE,LEVL II: ICD-10-PCS | Mod: ,,, | Performed by: NURSE PRACTITIONER

## 2019-04-30 PROCEDURE — 63600175 PHARM REV CODE 636 W HCPCS: Performed by: STUDENT IN AN ORGANIZED HEALTH CARE EDUCATION/TRAINING PROGRAM

## 2019-04-30 PROCEDURE — 63600175 PHARM REV CODE 636 W HCPCS: Performed by: THORACIC SURGERY (CARDIOTHORACIC VASCULAR SURGERY)

## 2019-04-30 PROCEDURE — 85730 THROMBOPLASTIN TIME PARTIAL: CPT

## 2019-04-30 PROCEDURE — 25000003 PHARM REV CODE 250: Performed by: THORACIC SURGERY (CARDIOTHORACIC VASCULAR SURGERY)

## 2019-04-30 PROCEDURE — 99233 SBSQ HOSP IP/OBS HIGH 50: CPT | Mod: 24,GC,, | Performed by: SURGERY

## 2019-04-30 PROCEDURE — 20600001 HC STEP DOWN PRIVATE ROOM

## 2019-04-30 PROCEDURE — 85610 PROTHROMBIN TIME: CPT

## 2019-04-30 PROCEDURE — 99232 SBSQ HOSP IP/OBS MODERATE 35: CPT | Mod: ,,, | Performed by: NURSE PRACTITIONER

## 2019-04-30 PROCEDURE — 85025 COMPLETE CBC W/AUTO DIFF WBC: CPT

## 2019-04-30 PROCEDURE — 80048 BASIC METABOLIC PNL TOTAL CA: CPT

## 2019-04-30 PROCEDURE — 84100 ASSAY OF PHOSPHORUS: CPT

## 2019-04-30 RX ORDER — IBUPROFEN 200 MG
24 TABLET ORAL
Status: DISCONTINUED | OUTPATIENT
Start: 2019-04-30 | End: 2019-05-02

## 2019-04-30 RX ORDER — SODIUM,POTASSIUM PHOSPHATES 280-250MG
2 POWDER IN PACKET (EA) ORAL EVERY 4 HOURS
Status: COMPLETED | OUTPATIENT
Start: 2019-04-30 | End: 2019-04-30

## 2019-04-30 RX ORDER — INSULIN ASPART 100 [IU]/ML
0-5 INJECTION, SOLUTION INTRAVENOUS; SUBCUTANEOUS
Status: DISCONTINUED | OUTPATIENT
Start: 2019-04-30 | End: 2019-05-02

## 2019-04-30 RX ORDER — IBUPROFEN 200 MG
16 TABLET ORAL
Status: DISCONTINUED | OUTPATIENT
Start: 2019-04-30 | End: 2019-05-02

## 2019-04-30 RX ORDER — GLUCAGON 1 MG
1 KIT INJECTION
Status: DISCONTINUED | OUTPATIENT
Start: 2019-04-30 | End: 2019-05-02

## 2019-04-30 RX ORDER — FUROSEMIDE 10 MG/ML
60 INJECTION INTRAMUSCULAR; INTRAVENOUS 2 TIMES DAILY
Status: DISCONTINUED | OUTPATIENT
Start: 2019-04-30 | End: 2019-05-02 | Stop reason: HOSPADM

## 2019-04-30 RX ADMIN — ONDANSETRON 4 MG: 2 INJECTION INTRAMUSCULAR; INTRAVENOUS at 11:04

## 2019-04-30 RX ADMIN — POTASSIUM & SODIUM PHOSPHATES POWDER PACK 280-160-250 MG 2 PACKET: 280-160-250 PACK at 03:04

## 2019-04-30 RX ADMIN — ACETAMINOPHEN 650 MG: 325 TABLET ORAL at 05:04

## 2019-04-30 RX ADMIN — ACETAMINOPHEN 650 MG: 325 TABLET ORAL at 11:04

## 2019-04-30 RX ADMIN — OXYCODONE HYDROCHLORIDE 10 MG: 10 TABLET ORAL at 02:04

## 2019-04-30 RX ADMIN — FUROSEMIDE 60 MG: 10 INJECTION, SOLUTION INTRAMUSCULAR; INTRAVENOUS at 05:04

## 2019-04-30 RX ADMIN — ACETAMINOPHEN 650 MG: 325 TABLET ORAL at 12:04

## 2019-04-30 RX ADMIN — MUPIROCIN 1 G: 20 OINTMENT TOPICAL at 08:04

## 2019-04-30 RX ADMIN — POTASSIUM CHLORIDE 40 MEQ: 1500 TABLET, EXTENDED RELEASE ORAL at 08:04

## 2019-04-30 RX ADMIN — OXYCODONE HYDROCHLORIDE 10 MG: 10 TABLET ORAL at 05:04

## 2019-04-30 RX ADMIN — AMIODARONE HYDROCHLORIDE 200 MG: 200 TABLET ORAL at 08:04

## 2019-04-30 RX ADMIN — LIDOCAINE 1 PATCH: 50 PATCH TOPICAL at 09:04

## 2019-04-30 RX ADMIN — DOCUSATE SODIUM 100 MG: 100 CAPSULE, LIQUID FILLED ORAL at 08:04

## 2019-04-30 RX ADMIN — OXYCODONE HYDROCHLORIDE 10 MG: 10 TABLET ORAL at 07:04

## 2019-04-30 RX ADMIN — OXYCODONE HYDROCHLORIDE 10 MG: 10 TABLET ORAL at 08:04

## 2019-04-30 RX ADMIN — POTASSIUM & SODIUM PHOSPHATES POWDER PACK 280-160-250 MG 2 PACKET: 280-160-250 PACK at 05:04

## 2019-04-30 RX ADMIN — OXYCODONE HYDROCHLORIDE 10 MG: 10 TABLET ORAL at 10:04

## 2019-04-30 RX ADMIN — FUROSEMIDE 60 MG: 10 INJECTION, SOLUTION INTRAMUSCULAR; INTRAVENOUS at 08:04

## 2019-04-30 RX ADMIN — POLYETHYLENE GLYCOL 3350 17 G: 17 POWDER, FOR SOLUTION ORAL at 08:04

## 2019-04-30 RX ADMIN — DIBASIC SODIUM PHOSPHATE, MONOBASIC POTASSIUM PHOSPHATE AND MONOBASIC SODIUM PHOSPHATE 1 TABLET: 852; 155; 130 TABLET ORAL at 04:04

## 2019-04-30 RX ADMIN — ASPIRIN 81 MG: 81 TABLET, COATED ORAL at 08:04

## 2019-04-30 RX ADMIN — OXYCODONE HYDROCHLORIDE 10 MG: 10 TABLET ORAL at 11:04

## 2019-04-30 RX ADMIN — WARFARIN SODIUM 2 MG: 2 TABLET ORAL at 05:04

## 2019-04-30 NOTE — PROGRESS NOTES
"[facility]  Endocrinology  Progress Note    Admit Date: 2019     Reason for Consult: Management of Hyperglycemia     Surgical Procedure and Date: Mitral valve replacement 19     Lab Results   Component Value Date    HGBA1C 5.3 2019     HPI:   Patient is a 64 y.o. female with a diagnosis of mitral valve prolapse s/p mitral valve repair (May 2018) and now with severe mitral stenosis, as well as paroxysmal atrial fibrillation.  She is s/p mitral valve replacement on 19. Mother with DM per chart review. Endocrinology consulted for management of hyperglycemia.                Interval HPI:   Overnight events: Remains in SICU. NAEON. BG well controlled; transition IV insulin infusion titrated off per protocol yesterday evening.   Eatin%  Nausea: No  Hypoglycemia and intervention: No  Fever: No  TPN and/or TF: No    BP (!) 93/54 (BP Location: Right arm, Patient Position: Lying)   Pulse 67   Temp 98.2 °F (36.8 °C) (Oral)   Resp (!) 26   Ht 5' 7" (1.702 m)   Wt 81.3 kg (179 lb 3.7 oz)   SpO2 95%   Breastfeeding? No   BMI 28.07 kg/m²      Labs Reviewed and Include    Recent Labs   Lab 19  0256      CALCIUM 9.3   *   K 4.5   CO2 30*   CL 94*   BUN 13   CREATININE 0.7     Lab Results   Component Value Date    WBC 6.89 2019    HGB 9.6 (L) 2019    HCT 29.9 (L) 2019    MCV 96 2019     (L) 2019     No results for input(s): TSH, FREET4 in the last 168 hours.  Lab Results   Component Value Date    HGBA1C 5.3 2019       Nutritional status:   Body mass index is 28.07 kg/m².  Lab Results   Component Value Date    ALBUMIN 4.1 2019    ALBUMIN 3.7 2019     No results found for: PREALBUMIN    Estimated Creatinine Clearance: 91.4 mL/min (based on SCr of 0.7 mg/dL).    Accu-Checks  Recent Labs     19  1101 19  1738 19  0252 19  0759 19  1249 19  1738 19  2146 " 04/30/19  0222 04/30/19  0808   POCTGLUCOSE 114* 123* 120* 129* 146* 152* 130* 118* 113* 110       Current Medications and/or Treatments Impacting Glycemic Control  Immunotherapy:    Immunosuppressants     None        Steroids:   Hormones (From admission, onward)    None        Pressors:    Autonomic Drugs (From admission, onward)    None        Hyperglycemia/Diabetes Medications:   Antihyperglycemics (From admission, onward)    Start     Stop Route Frequency Ordered    04/30/19 0958  insulin aspart U-100 pen 0-5 Units      -- SubQ Before meals & nightly PRN 04/30/19 0859          ASSESSMENT and PLAN    * Mitral stenosis  Managed per primary team  Avoid hypoglycemia        Hyperglycemia  BG goal 110-140    Plan:  Continue low dose correction scale  BG monitoring AC/HS    Discharge planning: TBD        S/P mitral valve repair  Post-op day 4  Managed per primary team              Nany Doan NP  Endocrinology  [facility]

## 2019-04-30 NOTE — NURSING TRANSFER
Nursing Transfer Note      4/30/2019     Transfer From: [facility]ICU    Transfer via wheelchair    Transfer with cardiac monitoring    Transported by RN    Medicines sent: mome    Chart send with patient: Yes    Notified: spouse    Patient reassessed at: 4/30/19 15:56 (date, time)    Upon arrival to floor: cardiac monitor applied, patient oriented to room, call bell in reach and bed in lowest position

## 2019-04-30 NOTE — NURSING TRANSFER
Nursing Transfer Note      4/30/2019     Transfer To: [id number]     Transfer via wheelchair    Transfer with cardiac monitoring    Transported by ICU RN    Chart send with patient: Yes    Notified: spouse    Patient reassessed at: 4/30/19,15:30 (date, time)    Upon arrival to floor: cardiac monitor applied, patient oriented to room, call bell in reach and bed in lowest position

## 2019-04-30 NOTE — SUBJECTIVE & OBJECTIVE
Interval History/Significant Events: NAEON. Pt  is up out of bed and in the chair this am.  Reports that she is tolerating PO without issue. Pain is well controlled. No issues. Step down orders in    Follow-up For: Procedure(s) (LRB):  REPLACEMENT, MITRAL VALVE (N/A)  COMPLETE MAZE PROCEDURE USING CRYOBLATION (N/A)  \REPAIR , LEFT ATRIAL APPENDAGE,  (Left)    Post-Operative Day: 3 Days Post-Op    Objective:     Vital Signs (Most Recent):  Temp: 98.7 °F (37.1 °C) (04/30/19 0300)  Pulse: (!) 57 (04/30/19 0700)  Resp: (!) 30 (04/30/19 0700)  BP: (!) 115/57 (04/30/19 0700)  SpO2: 96 % (04/30/19 0700) Vital Signs (24h Range):  Temp:  [97.8 °F (36.6 °C)-98.7 °F (37.1 °C)] 98.7 °F (37.1 °C)  Pulse:  [52-69] 57  Resp:  [11-34] 30  SpO2:  [90 %-100 %] 96 %  BP: ()/(52-72) 115/57     Weight: 81.3 kg (179 lb 3.7 oz)  Body mass index is 28.07 kg/m².      Intake/Output Summary (Last 24 hours) at 4/30/2019 0744  Last data filed at 4/30/2019 0300  Gross per 24 hour   Intake 1043 ml   Output 1755 ml   Net -712 ml       Physical Exam   Constitutional: She is oriented to person, place, and time. She appears well-developed and well-nourished. No distress.   HENT:   Head: Normocephalic and atraumatic.   Eyes: Pupils are equal, round, and reactive to light. EOM are normal.   Neck: Normal range of motion. Neck supple.   Cardiovascular: Regular rhythm. Bradycardia present.   +pacer wires; +chest tubes   Pulmonary/Chest: Effort normal and breath sounds normal. No respiratory distress.   Abdominal: Soft. She exhibits no distension.   Musculoskeletal: Normal range of motion.   Neurological: She is alert and oriented to person, place, and time.   Skin: Skin is warm and dry.       Vents:  Oxygen Concentration (%): 28 (04/27/19 0348)    Lines/Drains/Airways     Drain                 Y Chest Tube 1 and 2 04/26/19 1354 1 Anterior Mediastinal 19 Fr. Posterior Mediastinal 19 Fr. 3 days         Y Chest Tube 3 and 4 04/26/19 1355 Right Pleural  19 Fr. 4 Left Pleural 19 Fr. 3 days          Line                 Pacer Wires 04/26/19 1340 3 days          Peripheral Intravenous Line                 Peripheral IV - Single Lumen 04/27/19 1348 22 G Left Hand 2 days         Peripheral IV - Single Lumen 04/28/19 1040 20 G Left Antecubital 1 day                Significant Labs:    CBC/Anemia Profile:  Recent Labs   Lab 04/29/19  0245 04/30/19  0256   WBC 6.98 6.89   HGB 9.2* 9.6*   HCT 28.1* 29.9*   PLT 70* 131*   MCV 96 96   RDW 12.5 12.6        Chemistries:  Recent Labs   Lab 04/28/19  2250 04/29/19  0245 04/29/19  0803 04/29/19  1947 04/30/19  0256   NA  --  135*  --   --  134*   K 3.4* 3.7 4.1 3.9 4.5   CL  --  92*  --   --  94*   CO2  --  33*  --   --  30*   BUN  --  12  --   --  13   CREATININE  --  0.6  --   --  0.7   CALCIUM  --  9.1  --   --  9.3   MG 1.6 2.6  --   --  2.0   PHOS  --  1.9*  --   --  2.0*       All pertinent labs within the past 24 hours have been reviewed.    Significant Imaging:  I have reviewed and interpreted all pertinent imaging results/findings within the past 24 hours.

## 2019-04-30 NOTE — PROGRESS NOTES
[facility]  Cardiothoracic Surgery  Progress Note    Patient Name: Ericka Lewis  MRN: [id number]  Admission Date: 4/21/2019  Hospital Length of Stay: 9 days  Code Status: Prior   Attending Physician: Yonatan Mcintosh MD   Referring Provider: Yonatan Mcintosh MD  Principal Problem:Mitral stenosis      Subjective:     Post-Op Info:  Procedure(s) (LRB):  REPLACEMENT, MITRAL VALVE (N/A)  COMPLETE MAZE PROCEDURE USING CRYOBLATION (N/A)  \REPAIR , LEFT ATRIAL APPENDAGE,  (Left)   4 Days Post-Op     Interval History:   4 Days Post-Op  NAEON. AF. MAPs 66 - 97.  Chest tube output decreasing.  Cont to have Bradycardia HR range 46 - 69.  Pain controlled.  Up in chair this AM.       Medications:  Continuous Infusions:   insulin (HUMAN R) infusion (adults) Stopped (04/29/19 1730)     Scheduled Meds:   acetaminophen  650 mg Oral Q6H    amiodarone  200 mg Oral BID    aspirin  81 mg Oral Daily    docusate sodium  100 mg Oral BID    furosemide  60 mg Intravenous BID    lidocaine  1 patch Transdermal Q24H    mupirocin  1 g Nasal BID    polyethylene glycol  17 g Oral Daily    potassium chloride  40 mEq Oral BID    warfarin  2 mg Oral Daily     PRN Meds:albumin human 5%, bisacodyl, dextrose 50%, dextrose 50%, glucagon (human recombinant), glucose, glucose, HYDROmorphone, insulin aspart U-100, k phos di & mono-sod phos mono, metoclopramide HCl, ondansetron, oxyCODONE, oxyCODONE, sodium chloride 0.9%, sodium phosphate IVPB     Objective:     Vital Signs (Most Recent):  Temp: 98.2 °F (36.8 °C) (04/30/19 0700)  Pulse: (!) 57 (04/30/19 0700)  Resp: (!) 30 (04/30/19 0700)  BP: (!) 115/57 (04/30/19 0700)  SpO2: 96 % (04/30/19 0700) Vital Signs (24h Range):  Temp:  [97.8 °F (36.6 °C)-98.7 °F (37.1 °C)] 98.2 °F (36.8 °C)  Pulse:  [52-69] 57  Resp:  [11-34] 30  SpO2:  [90 %-100 %] 96 %  BP: ()/(52-72) 115/57     Weight: 81.3 kg (179 lb 3.7 oz)  Body mass index is 28.07 kg/m².    SpO2: 96 %  O2 Device  (Oxygen Therapy): nasal cannula    Intake/Output - Last 3 Shifts       04/28 0700 - 04/29 0659 04/29 0700 - 04/30 0659 04/30 0700 - 05/01 0659    P.O. 920 830     I.V. (mL/kg) 561 (6.9) 213 (2.6)     Total Intake(mL/kg) 1481 (18.2) 1043 (12.8)     Urine (mL/kg/hr) 2782 (1.4) 1625 (0.8)     Chest Tube 350 190 30    Total Output 3132 1815 30    Net -1651 -772 -30           Urine Occurrence  1 x           Lines/Drains/Airways     Drain                 Y Chest Tube 1 and 2 04/26/19 1354 1 Anterior Mediastinal 19 Fr. Posterior Mediastinal 19 Fr. 3 days         Y Chest Tube 3 and 4 04/26/19 1355 Right Pleural 19 Fr. 4 Left Pleural 19 Fr. 3 days          Line                 Pacer Wires 04/26/19 1340 3 days          Peripheral Intravenous Line                 Peripheral IV - Single Lumen 04/27/19 1348 22 G Left Hand 2 days         Peripheral IV - Single Lumen 04/28/19 1040 20 G Left Antecubital 1 day                Physical Exam    A&O, NAD  RRR  No Resp distress 1L NC  Chest with dressing c/d/i  Chest tubes with SS output,   Ext warm    Significant Labs:  CBC:   Recent Labs   Lab 04/30/19  0256   WBC 6.89   RBC 3.11*   HGB 9.6*   HCT 29.9*   *   MCV 96   MCH 30.9   MCHC 32.1     CMP:   Recent Labs   Lab 04/30/19  0256      CALCIUM 9.3   *   K 4.5   CO2 30*   CL 94*   BUN 13   CREATININE 0.7     Coagulation:   Recent Labs   Lab 04/30/19  0451   INR 1.6*   APTT 29.7     All pertinent labs from the last 24 hours have been reviewed.    Significant Diagnostics:  CXR: I have reviewed all pertinent results/findings within the past 24 hours        Assessment/Plan:     * Mitral stenosis  Ericka Lewis is a 64 y.o. female s/p redo Mitral Valve Repair on 4/26 with Dr. Mcintosh.     Neuro:   - PO pain meds PRN  - PRN dilaudid      Pulmonary:   - 1 L NC, wean as tolerated  - IS/Pulm toilet     Cardiac:   Epi off, normotensive, HR remains 40 -60  - Backup pacer off  - Coumadin 2 scheduled   - Amio 200 BID  - Holding  BB due to bradycardia  - d/c CTs and wires today  - TTE today     Renal:    - BUN/Cr normal preop  - Lasix 60 BID     ID:   - Periop ancef     Hem/Onc:   -Stable H/H  -Protamine given 4/26 for rebound heparin action     Endocrine:    - low dose SSI  - following endo recs     Fluids/Electrolytes/Nutrition/GI:   - Replace electrolytes PRN  - cardiac diet     PPx:  - DVT: SCDs  - Bowel: PPI     DISPO:  - Continue SICU care. D/c drains and wires today. Awaiting bed on the floor.              Gerson Osman MD  Cardiothoracic Surgery  [facility]

## 2019-04-30 NOTE — SUBJECTIVE & OBJECTIVE
"Interval HPI:   Overnight events: Remains in SICU. NAEON. BG well controlled; transition IV insulin infusion titrated off per protocol yesterday evening.   Eatin%  Nausea: No  Hypoglycemia and intervention: No  Fever: No  TPN and/or TF: No    BP (!) 93/54 (BP Location: Right arm, Patient Position: Lying)   Pulse 67   Temp 98.2 °F (36.8 °C) (Oral)   Resp (!) 26   Ht 5' 7" (1.702 m)   Wt 81.3 kg (179 lb 3.7 oz)   SpO2 95%   Breastfeeding? No   BMI 28.07 kg/m²     Labs Reviewed and Include    Recent Labs   Lab 19  0256      CALCIUM 9.3   *   K 4.5   CO2 30*   CL 94*   BUN 13   CREATININE 0.7     Lab Results   Component Value Date    WBC 6.89 2019    HGB 9.6 (L) 2019    HCT 29.9 (L) 2019    MCV 96 2019     (L) 2019     No results for input(s): TSH, FREET4 in the last 168 hours.  Lab Results   Component Value Date    HGBA1C 5.3 2019       Nutritional status:   Body mass index is 28.07 kg/m².  Lab Results   Component Value Date    ALBUMIN 4.1 2019    ALBUMIN 3.7 2019     No results found for: PREALBUMIN    Estimated Creatinine Clearance: 91.4 mL/min (based on SCr of 0.7 mg/dL).    Accu-Checks  Recent Labs     19  1101 19  1738 19  0252 19  0759 19  1249 19  1738 19  2146 19  0222 19  0808   POCTGLUCOSE 114* 123* 120* 129* 146* 152* 130* 118* 113* 110       Current Medications and/or Treatments Impacting Glycemic Control  Immunotherapy:    Immunosuppressants     None        Steroids:   Hormones (From admission, onward)    None        Pressors:    Autonomic Drugs (From admission, onward)    None        Hyperglycemia/Diabetes Medications:   Antihyperglycemics (From admission, onward)    Start     Stop Route Frequency Ordered    19 0958  insulin aspart U-100 pen 0-5 Units      -- SubQ Before meals & nightly PRN 19 0859        "

## 2019-04-30 NOTE — ASSESSMENT & PLAN NOTE
Ericka Lewis is a 64 y.o. female s/p redo Mitral Valve Repair on 4/26 with Dr. Mcintosh.     Neuro:   - PO pain meds PRN  - PRN dilaudid      Pulmonary:   - 1 L NC, wean as tolerated  - IS/Pulm toilet     Cardiac:   Epi off, normotensive, HR remains 40 -60  - Backup pacer off  - Coumadin 2 scheduled   - Amio 200 BID  - Holding BB due to bradycardia  - d/c CTs and wires today  - TTE today     Renal:    - BUN/Cr normal preop  - Lasix 60 BID     ID:   - Periop ancef     Hem/Onc:   -Stable H/H  -Protamine given 4/26 for rebound heparin action     Endocrine:    - low dose SSI  - following endo recs     Fluids/Electrolytes/Nutrition/GI:   - Replace electrolytes PRN  - cardiac diet     PPx:  - DVT: SCDs  - Bowel: PPI     DISPO:  - Continue SICU care. D/c drains and wires today. Awaiting bed on the floor.

## 2019-04-30 NOTE — PT/OT/SLP PROGRESS
Physical Therapy Treatment    Patient Name:  Ericka Lewis   MRN:  [id number]    Recommendations:     Discharge Recommendations:  (home no needs)   Discharge Equipment Recommendations: none   Barriers to discharge: None    Assessment:     Ericka Lewis is a 64 y.o. female admitted with a medical diagnosis of Mitral stenosis.  She presents with the following impairments/functional limitations:  gait instability, impaired endurance, impaired functional mobilty pt raheel treatment well being able to gait train farther. Pt will benefit from cont skilled PT 5x/wk to progress physically. Pt will be able to discharge home with no needs when medically stable. Pt is s/p MVR, MAZE, L atrial appendage closure 4/26/19.    Rehab Prognosis: Good; patient would benefit from acute skilled PT services to address these deficits and reach maximum level of function.    Recent Surgery: Procedure(s) (LRB):  REPLACEMENT, MITRAL VALVE (N/A)  COMPLETE MAZE PROCEDURE USING CRYOBLATION (N/A)  \REPAIR , LEFT ATRIAL APPENDAGE,  (Left) 4 Days Post-Op    Plan:     During this hospitalization, patient to be seen 5 x/week to address the identified rehab impairments via gait training, therapeutic activities and progress toward the following goals:    · Plan of Care Expires:  05/25/19    Subjective     Chief Complaint: pt c/o pain during treatment.   Patient/Family Comments/goals:  To get better and go home.   Pain/Comfort:  · Pain Rating 1: 6/10  · Location 1: (chest)  · Pain Rating Post-Intervention 1: 6/10      Objective:     Communicated with nurse prior to session.  Patient found up in chair with telemetry, pulse ox (continuous), blood pressure cuff(hep lock IV) upon PT entry to room.     General Precautions: Standard, fall, sternal   Orthopedic Precautions:    Braces:       Functional Mobility:  · Transfers:     · Sit to Stand:  contact guard assistance with no AD  · Gait: pt received gait training ~ 250 ft with CGA and RN with portable monitor  intact.      AM-PAC 6 CLICK MOBILITY  Turning over in bed (including adjusting bedclothes, sheets and blankets)?: 3  Sitting down on and standing up from a chair with arms (e.g., wheelchair, bedside commode, etc.): 3  Moving from lying on back to sitting on the side of the bed?: 3  Moving to and from a bed to a chair (including a wheelchair)?: 3  Need to walk in hospital room?: 3  Climbing 3-5 steps with a railing?: 3  Basic Mobility Total Score: 18         Patient left up in chair with all lines intact, call button in reach and  present..    GOALS:   Multidisciplinary Problems     Physical Therapy Goals        Problem: Physical Therapy Goal    Goal Priority Disciplines Outcome Goal Variances Interventions   Physical Therapy Goal     PT, PT/OT      Description:  Goals to be met by: 19    Patient will increase functional independence with mobility by performin. Supine to sit with Stand-by Assistance -not met  2. Sit to stand transfer with Supervision -not met  3. Gait  x 250 feet with Supervision -not met   4. Ascend/descend 4 stair with bilateral Handrails Supervision -not met                      Time Tracking:     PT Received On: 19  PT Start Time: 1412     PT Stop Time: 1423  PT Total Time (min): 11 min     Billable Minutes: Gait Training 11 min    Treatment Type: Treatment  PT/PTA: PT     PTA Visit Number: 0     Liz Jaime, PT  2019

## 2019-04-30 NOTE — ASSESSMENT & PLAN NOTE
Ericka Lewis is a 64 y.o. female s/p redo Mitral Valve Repair on 4/26 with Dr. Horowitz.  She was brought up to SICU extubated and on epi, but quickly needed BIPAP.    Neuro:   - PO pain meds PRN     Pulmonary:   - Satting well on 2 L NC, wean as tolerated, pulm toilet    Cardiac:   - HDS off pressors  - Intermittently bradycardic   - Amiodarone decreased to 200 mg BID  - Lasix 20 g IV BID  -  mg QD  - Warfarin 2 mg QD    Renal:    - BUN/Cr normal preop  - Remain WNL; cont to monitor  - UOP 1.6 L over 24 hours    ID:   - Periop ancef  - WBC wnl; afebrile    Hem/Onc:   - Stable H/H  - Warfarin 2 mg QD; INR 1.6      Endocrine:    - SSI   - Cont to monitor glucose    Fluids/Electrolytes/Nutrition/GI:   - Replace electrolytes PRN    PPx:  - DVT: ASA, warfarin  - Bowel: PPI    DISPO:  - Recommend stepdown as pt is stable

## 2019-04-30 NOTE — PROGRESS NOTES
[facility]  Critical Care - Surgery  Progress Note    Patient Name: Ericka Lewis  MRN: [id number]  Admission Date: 4/21/2019  Hospital Length of Stay: 9 days  Code Status: Prior  Attending Provider: Yonatan Mcintosh MD  Primary Care Provider: Primary Doctor No   Principal Problem: Mitral stenosis    Subjective:     Hospital/ICU Course:  No notes on file    Interval History/Significant Events: NAEON. Pt  is up out of bed and in the chair this am.  Reports that she is tolerating PO without issue. Pain is well controlled. No issues. Step down orders in    Follow-up For: Procedure(s) (LRB):  REPLACEMENT, MITRAL VALVE (N/A)  COMPLETE MAZE PROCEDURE USING CRYOBLATION (N/A)  \REPAIR , LEFT ATRIAL APPENDAGE,  (Left)    Post-Operative Day: 3 Days Post-Op    Objective:     Vital Signs (Most Recent):  Temp: 98.7 °F (37.1 °C) (04/30/19 0300)  Pulse: (!) 57 (04/30/19 0700)  Resp: (!) 30 (04/30/19 0700)  BP: (!) 115/57 (04/30/19 0700)  SpO2: 96 % (04/30/19 0700) Vital Signs (24h Range):  Temp:  [97.8 °F (36.6 °C)-98.7 °F (37.1 °C)] 98.7 °F (37.1 °C)  Pulse:  [52-69] 57  Resp:  [11-34] 30  SpO2:  [90 %-100 %] 96 %  BP: ()/(52-72) 115/57     Weight: 81.3 kg (179 lb 3.7 oz)  Body mass index is 28.07 kg/m².      Intake/Output Summary (Last 24 hours) at 4/30/2019 0744  Last data filed at 4/30/2019 0300  Gross per 24 hour   Intake 1043 ml   Output 1755 ml   Net -712 ml       Physical Exam   Constitutional: She is oriented to person, place, and time. She appears well-developed and well-nourished. No distress.   HENT:   Head: Normocephalic and atraumatic.   Eyes: Pupils are equal, round, and reactive to light. EOM are normal.   Neck: Normal range of motion. Neck supple.   Cardiovascular: Regular rhythm. Bradycardia present.   +pacer wires; +chest tubes   Pulmonary/Chest: Effort normal and breath sounds normal. No respiratory distress.   Abdominal: Soft. She exhibits no distension.   Musculoskeletal: Normal  range of motion.   Neurological: She is alert and oriented to person, place, and time.   Skin: Skin is warm and dry.       Vents:  Oxygen Concentration (%): 28 (04/27/19 0348)    Lines/Drains/Airways     Drain                 Y Chest Tube 1 and 2 04/26/19 1354 1 Anterior Mediastinal 19 Fr. Posterior Mediastinal 19 Fr. 3 days         Y Chest Tube 3 and 4 04/26/19 1355 Right Pleural 19 Fr. 4 Left Pleural 19 Fr. 3 days          Line                 Pacer Wires 04/26/19 1340 3 days          Peripheral Intravenous Line                 Peripheral IV - Single Lumen 04/27/19 1348 22 G Left Hand 2 days         Peripheral IV - Single Lumen 04/28/19 1040 20 G Left Antecubital 1 day                Significant Labs:    CBC/Anemia Profile:  Recent Labs   Lab 04/29/19  0245 04/30/19  0256   WBC 6.98 6.89   HGB 9.2* 9.6*   HCT 28.1* 29.9*   PLT 70* 131*   MCV 96 96   RDW 12.5 12.6        Chemistries:  Recent Labs   Lab 04/28/19  2250 04/29/19  0245 04/29/19  0803 04/29/19  1947 04/30/19  0256   NA  --  135*  --   --  134*   K 3.4* 3.7 4.1 3.9 4.5   CL  --  92*  --   --  94*   CO2  --  33*  --   --  30*   BUN  --  12  --   --  13   CREATININE  --  0.6  --   --  0.7   CALCIUM  --  9.1  --   --  9.3   MG 1.6 2.6  --   --  2.0   PHOS  --  1.9*  --   --  2.0*       All pertinent labs within the past 24 hours have been reviewed.    Significant Imaging:  I have reviewed and interpreted all pertinent imaging results/findings within the past 24 hours.    Assessment/Plan:     S/P mitral valve repair  Ericka Lewis is a 64 y.o. female s/p redo Mitral Valve Repair on 4/26 with Dr. Horowitz.  She was brought up to SICU extubated and on epi, but quickly needed BIPAP.    Neuro:   - PO pain meds PRN     Pulmonary:   - Satting well on 2 L NC, wean as tolerated, pulm toilet    Cardiac:   - HDS off pressors  - Intermittently bradycardic   - Amiodarone decreased to 200 mg BID  - Lasix 20 g IV BID  -  mg QD  - Warfarin 2 mg QD    Renal:    -  BUN/Cr normal preop  - Remain WNL; cont to monitor  - UOP 1.6 L over 24 hours    ID:   - Periop ancef  - WBC wnl; afebrile    Hem/Onc:   - Stable H/H  - Warfarin 2 mg QD; INR 1.6      Endocrine:    - SSI   - Cont to monitor glucose    Fluids/Electrolytes/Nutrition/GI:   - Replace electrolytes PRN    PPx:  - DVT: ASA, warfarin  - Bowel: PPI    DISPO:  - Recommend stepdown as pt is stable        Critical care was time spent personally by me on the following activities: development of treatment plan with patient or surrogate and bedside caregivers, discussions with consultants, evaluation of patient's response to treatment, examination of patient, ordering and performing treatments and interventions, ordering and review of laboratory studies, ordering and review of radiographic studies, pulse oximetry, re-evaluation of patient's condition.  This critical care time did not overlap with that of any other provider or involve time for any procedures.     Isidro Amin MD  Critical Care - Surgery  [facility]

## 2019-04-30 NOTE — ASSESSMENT & PLAN NOTE
BG goal 110-140    Plan:  Continue low dose correction scale  BG monitoring AC/HS    Discharge planning: TBD

## 2019-04-30 NOTE — SUBJECTIVE & OBJECTIVE
Interval History:   4 Days Post-Op  NAEON. AF. MAPs 66 - 97.  Chest tube output decreasing.  Cont to have Bradycardia HR range 46 - 69.  Pain controlled.  Up in chair this AM.       Medications:  Continuous Infusions:   insulin (HUMAN R) infusion (adults) Stopped (04/29/19 1730)     Scheduled Meds:   acetaminophen  650 mg Oral Q6H    amiodarone  200 mg Oral BID    aspirin  81 mg Oral Daily    docusate sodium  100 mg Oral BID    furosemide  60 mg Intravenous BID    lidocaine  1 patch Transdermal Q24H    mupirocin  1 g Nasal BID    polyethylene glycol  17 g Oral Daily    potassium chloride  40 mEq Oral BID    warfarin  2 mg Oral Daily     PRN Meds:albumin human 5%, bisacodyl, dextrose 50%, dextrose 50%, glucagon (human recombinant), glucose, glucose, HYDROmorphone, insulin aspart U-100, k phos di & mono-sod phos mono, metoclopramide HCl, ondansetron, oxyCODONE, oxyCODONE, sodium chloride 0.9%, sodium phosphate IVPB     Objective:     Vital Signs (Most Recent):  Temp: 98.2 °F (36.8 °C) (04/30/19 0700)  Pulse: (!) 57 (04/30/19 0700)  Resp: (!) 30 (04/30/19 0700)  BP: (!) 115/57 (04/30/19 0700)  SpO2: 96 % (04/30/19 0700) Vital Signs (24h Range):  Temp:  [97.8 °F (36.6 °C)-98.7 °F (37.1 °C)] 98.2 °F (36.8 °C)  Pulse:  [52-69] 57  Resp:  [11-34] 30  SpO2:  [90 %-100 %] 96 %  BP: ()/(52-72) 115/57     Weight: 81.3 kg (179 lb 3.7 oz)  Body mass index is 28.07 kg/m².    SpO2: 96 %  O2 Device (Oxygen Therapy): nasal cannula    Intake/Output - Last 3 Shifts       04/28 0700 - 04/29 0659 04/29 0700 - 04/30 0659 04/30 0700 - 05/01 0659    P.O. 920 830     I.V. (mL/kg) 561 (6.9) 213 (2.6)     Total Intake(mL/kg) 1481 (18.2) 1043 (12.8)     Urine (mL/kg/hr) 2782 (1.4) 1625 (0.8)     Chest Tube 350 190 30    Total Output 3132 1815 30    Net -1651 -772 -30           Urine Occurrence  1 x           Lines/Drains/Airways     Drain                 Y Chest Tube 1 and 2 04/26/19 1354 1 Anterior Mediastinal 19 Fr.  Posterior Mediastinal 19 Fr. 3 days         Y Chest Tube 3 and 4 04/26/19 1355 Right Pleural 19 Fr. 4 Left Pleural 19 Fr. 3 days          Line                 Pacer Wires 04/26/19 1340 3 days          Peripheral Intravenous Line                 Peripheral IV - Single Lumen 04/27/19 1348 22 G Left Hand 2 days         Peripheral IV - Single Lumen 04/28/19 1040 20 G Left Antecubital 1 day                Physical Exam    A&O, NAD  RRR  No Resp distress 1L NC  Chest with dressing c/d/i  Chest tubes with SS output,   Ext warm    Significant Labs:  CBC:   Recent Labs   Lab 04/30/19  0256   WBC 6.89   RBC 3.11*   HGB 9.6*   HCT 29.9*   *   MCV 96   MCH 30.9   MCHC 32.1     CMP:   Recent Labs   Lab 04/30/19  0256      CALCIUM 9.3   *   K 4.5   CO2 30*   CL 94*   BUN 13   CREATININE 0.7     Coagulation:   Recent Labs   Lab 04/30/19  0451   INR 1.6*   APTT 29.7     All pertinent labs from the last 24 hours have been reviewed.    Significant Diagnostics:  CXR: I have reviewed all pertinent results/findings within the past 24 hours

## 2019-05-01 LAB
ANION GAP SERPL CALC-SCNC: 10 MMOL/L (ref 8–16)
ASCENDING AORTA: 3.34 CM
AV INDEX (PROSTH): 1.22
AV MEAN GRADIENT: 1.9 MMHG
AV PEAK GRADIENT: 3.39 MMHG
AV VALVE AREA: 4.63 CM2
AV VELOCITY RATIO: 1.23
BASOPHILS # BLD AUTO: 0.04 K/UL (ref 0–0.2)
BASOPHILS NFR BLD: 0.7 % (ref 0–1.9)
BILIRUB UR QL STRIP: NEGATIVE
BSA FOR ECHO PROCEDURE: 1.9 M2
BUN SERPL-MCNC: 12 MG/DL (ref 8–23)
CALCIUM SERPL-MCNC: 9.1 MG/DL (ref 8.7–10.5)
CHLORIDE SERPL-SCNC: 95 MMOL/L (ref 95–110)
CLARITY UR REFRACT.AUTO: CLEAR
CO2 SERPL-SCNC: 31 MMOL/L (ref 23–29)
COLOR UR AUTO: NORMAL
CREAT SERPL-MCNC: 0.6 MG/DL (ref 0.5–1.4)
CV ECHO LV RWT: 0.3 CM
DIFFERENTIAL METHOD: ABNORMAL
DOP CALC AO PEAK VEL: 0.92 M/S
DOP CALC AO VTI: 14.7 CM
DOP CALC LVOT AREA: 3.8 CM2
DOP CALC LVOT DIAMETER: 2.2 CM
DOP CALC LVOT PEAK VEL: 1.13 M/S
DOP CALC LVOT STROKE VOLUME: 68.09 CM3
DOP CALCLVOT PEAK VEL VTI: 17.92 CM
E/E' RATIO: 19.18
ECHO LV POSTERIOR WALL: 0.77 CM (ref 0.6–1.1)
EOSINOPHIL # BLD AUTO: 0.4 K/UL (ref 0–0.5)
EOSINOPHIL NFR BLD: 7.5 % (ref 0–8)
ERYTHROCYTE [DISTWIDTH] IN BLOOD BY AUTOMATED COUNT: 12.9 % (ref 11.5–14.5)
EST. GFR  (AFRICAN AMERICAN): >60 ML/MIN/1.73 M^2
EST. GFR  (NON AFRICAN AMERICAN): >60 ML/MIN/1.73 M^2
FRACTIONAL SHORTENING: 28 % (ref 28–44)
GLUCOSE SERPL-MCNC: 111 MG/DL (ref 70–110)
GLUCOSE UR QL STRIP: NEGATIVE
HCT VFR BLD AUTO: 29.3 % (ref 37–48.5)
HGB BLD-MCNC: 9.3 G/DL (ref 12–16)
HGB UR QL STRIP: NEGATIVE
IMM GRANULOCYTES # BLD AUTO: 0.02 K/UL (ref 0–0.04)
IMM GRANULOCYTES NFR BLD AUTO: 0.4 % (ref 0–0.5)
INR PPP: 1.6 (ref 0.8–1.2)
INTERVENTRICULAR SEPTUM: 0.95 CM (ref 0.6–1.1)
KETONES UR QL STRIP: NEGATIVE
LA MAJOR: 4.88 CM
LA MINOR: 4.84 CM
LA WIDTH: 4.42 CM
LEFT ATRIUM SIZE: 4.5 CM
LEFT ATRIUM VOLUME INDEX: 43.6 ML/M2
LEFT ATRIUM VOLUME: 82.16 CM3
LEFT INTERNAL DIMENSION IN SYSTOLE: 3.74 CM (ref 2.1–4)
LEFT VENTRICLE DIASTOLIC VOLUME INDEX: 68.94 ML/M2
LEFT VENTRICLE DIASTOLIC VOLUME: 129.78 ML
LEFT VENTRICLE MASS INDEX: 84.6 G/M2
LEFT VENTRICLE SYSTOLIC VOLUME INDEX: 31.6 ML/M2
LEFT VENTRICLE SYSTOLIC VOLUME: 59.5 ML
LEFT VENTRICULAR INTERNAL DIMENSION IN DIASTOLE: 5.2 CM (ref 3.5–6)
LEFT VENTRICULAR MASS: 159.32 G
LEUKOCYTE ESTERASE UR QL STRIP: NEGATIVE
LV LATERAL E/E' RATIO: 13.58
LV SEPTAL E/E' RATIO: 32.6
LYMPHOCYTES # BLD AUTO: 1.4 K/UL (ref 1–4.8)
LYMPHOCYTES NFR BLD: 24.2 % (ref 18–48)
MAGNESIUM SERPL-MCNC: 1.7 MG/DL (ref 1.6–2.6)
MCH RBC QN AUTO: 31.2 PG (ref 27–31)
MCHC RBC AUTO-ENTMCNC: 31.7 G/DL (ref 32–36)
MCV RBC AUTO: 98 FL (ref 82–98)
MONOCYTES # BLD AUTO: 0.8 K/UL (ref 0.3–1)
MONOCYTES NFR BLD: 13.5 % (ref 4–15)
MV PEAK E VEL: 1.63 M/S
NEUTROPHILS # BLD AUTO: 3 K/UL (ref 1.8–7.7)
NEUTROPHILS NFR BLD: 53.7 % (ref 38–73)
NITRITE UR QL STRIP: NEGATIVE
NRBC BLD-RTO: 0 /100 WBC
PH UR STRIP: 6 [PH] (ref 5–8)
PHOSPHATE SERPL-MCNC: 3.7 MG/DL (ref 2.7–4.5)
PISA TR MAX VEL: 2.76 M/S
PLATELET # BLD AUTO: 131 K/UL (ref 150–350)
PMV BLD AUTO: 11 FL (ref 9.2–12.9)
POCT GLUCOSE: 105 MG/DL (ref 70–110)
POCT GLUCOSE: 124 MG/DL (ref 70–110)
POCT GLUCOSE: 159 MG/DL (ref 70–110)
POTASSIUM SERPL-SCNC: 3.9 MMOL/L (ref 3.5–5.1)
PROT UR QL STRIP: NEGATIVE
PROTHROMBIN TIME: 15.4 SEC (ref 9–12.5)
PULM VEIN S/D RATIO: 1
PV PEAK D VEL: 0.72 M/S
PV PEAK S VEL: 0.72 M/S
RA MAJOR: 3.31 CM
RA PRESSURE: 3 MMHG
RA WIDTH: 2.54 CM
RBC # BLD AUTO: 2.98 M/UL (ref 4–5.4)
RIGHT VENTRICULAR END-DIASTOLIC DIMENSION: 3.8 CM
SINUS: 3.89 CM
SODIUM SERPL-SCNC: 136 MMOL/L (ref 136–145)
SP GR UR STRIP: 1 (ref 1–1.03)
STJ: 3.1 CM
TDI LATERAL: 0.12
TDI SEPTAL: 0.05
TDI: 0.09
TR MAX PG: 30.47 MMHG
TRICUSPID ANNULAR PLANE SYSTOLIC EXCURSION: 0.86 CM
TV REST PULMONARY ARTERY PRESSURE: 33 MMHG
URN SPEC COLLECT METH UR: NORMAL
WBC # BLD AUTO: 5.62 K/UL (ref 3.9–12.7)

## 2019-05-01 PROCEDURE — 83735 ASSAY OF MAGNESIUM: CPT

## 2019-05-01 PROCEDURE — 84100 ASSAY OF PHOSPHORUS: CPT

## 2019-05-01 PROCEDURE — 25000003 PHARM REV CODE 250: Performed by: NURSE PRACTITIONER

## 2019-05-01 PROCEDURE — 25000003 PHARM REV CODE 250: Performed by: THORACIC SURGERY (CARDIOTHORACIC VASCULAR SURGERY)

## 2019-05-01 PROCEDURE — 80048 BASIC METABOLIC PNL TOTAL CA: CPT

## 2019-05-01 PROCEDURE — 85610 PROTHROMBIN TIME: CPT

## 2019-05-01 PROCEDURE — 85025 COMPLETE CBC W/AUTO DIFF WBC: CPT

## 2019-05-01 PROCEDURE — 97530 THERAPEUTIC ACTIVITIES: CPT

## 2019-05-01 PROCEDURE — 20600001 HC STEP DOWN PRIVATE ROOM

## 2019-05-01 PROCEDURE — 25000003 PHARM REV CODE 250: Performed by: STUDENT IN AN ORGANIZED HEALTH CARE EDUCATION/TRAINING PROGRAM

## 2019-05-01 PROCEDURE — 63600175 PHARM REV CODE 636 W HCPCS: Performed by: THORACIC SURGERY (CARDIOTHORACIC VASCULAR SURGERY)

## 2019-05-01 PROCEDURE — 81003 URINALYSIS AUTO W/O SCOPE: CPT

## 2019-05-01 PROCEDURE — 36415 COLL VENOUS BLD VENIPUNCTURE: CPT

## 2019-05-01 RX ORDER — WARFARIN 4 MG/1
4 TABLET ORAL DAILY
Status: DISCONTINUED | OUTPATIENT
Start: 2019-05-01 | End: 2019-05-02 | Stop reason: HOSPADM

## 2019-05-01 RX ADMIN — ASPIRIN 81 MG: 81 TABLET, COATED ORAL at 08:05

## 2019-05-01 RX ADMIN — AMIODARONE HYDROCHLORIDE 200 MG: 200 TABLET ORAL at 08:05

## 2019-05-01 RX ADMIN — OXYCODONE HYDROCHLORIDE 10 MG: 10 TABLET ORAL at 03:05

## 2019-05-01 RX ADMIN — POTASSIUM CHLORIDE 40 MEQ: 1500 TABLET, EXTENDED RELEASE ORAL at 08:05

## 2019-05-01 RX ADMIN — ACETAMINOPHEN 650 MG: 325 TABLET ORAL at 01:05

## 2019-05-01 RX ADMIN — ACETAMINOPHEN 650 MG: 325 TABLET ORAL at 06:05

## 2019-05-01 RX ADMIN — OXYCODONE HYDROCHLORIDE 10 MG: 10 TABLET ORAL at 08:05

## 2019-05-01 RX ADMIN — ACETAMINOPHEN 650 MG: 325 TABLET ORAL at 11:05

## 2019-05-01 RX ADMIN — OXYCODONE HYDROCHLORIDE 10 MG: 10 TABLET ORAL at 11:05

## 2019-05-01 RX ADMIN — FUROSEMIDE 60 MG: 10 INJECTION, SOLUTION INTRAMUSCULAR; INTRAVENOUS at 08:05

## 2019-05-01 RX ADMIN — WARFARIN SODIUM 4 MG: 4 TABLET ORAL at 05:05

## 2019-05-01 RX ADMIN — LIDOCAINE 1 PATCH: 50 PATCH TOPICAL at 09:05

## 2019-05-01 RX ADMIN — FUROSEMIDE 60 MG: 10 INJECTION, SOLUTION INTRAMUSCULAR; INTRAVENOUS at 05:05

## 2019-05-01 RX ADMIN — MUPIROCIN 1 G: 20 OINTMENT TOPICAL at 08:05

## 2019-05-01 NOTE — PROGRESS NOTES
[facility]  Cardiothoracic Surgery  Progress Note    Patient Name: Ericka Lewis  MRN: [id number]  Admission Date: 4/21/2019  Hospital Length of Stay: 10 days  Code Status: Prior   Attending Physician: Yonatan Mcintosh MD   Referring Provider: Yonatan Mcintosh MD  Principal Problem:Mitral stenosis      Subjective:     Post-Op Info:  Procedure(s) (LRB):  REPLACEMENT, MITRAL VALVE (N/A)  COMPLETE MAZE PROCEDURE USING CRYOBLATION (N/A)  \REPAIR , LEFT ATRIAL APPENDAGE,  (Left)   5 Days Post-Op     Interval History: Pt transferred from ICU last night. Pt has no complaints waiting for INR to be therapeutic. NSR on monitor.     Review of Systems   Constitution: Negative for decreased appetite, fever and malaise/fatigue.   Cardiovascular: Negative for chest pain, claudication, dyspnea on exertion, irregular heartbeat, leg swelling, palpitations and syncope.   Respiratory: Negative for cough and shortness of breath.    Hematologic/Lymphatic: Negative for bleeding problem.   Skin: Negative for rash.   Musculoskeletal: Negative for arthritis and myalgias.   Gastrointestinal: Negative for abdominal pain, diarrhea, melena, nausea and vomiting.   Genitourinary: Negative for dysuria.   Neurological: Negative for headaches, paresthesias and seizures.     Medications:  Continuous Infusions:  Scheduled Meds:   acetaminophen  650 mg Oral Q6H    amiodarone  200 mg Oral BID    aspirin  81 mg Oral Daily    docusate sodium  100 mg Oral BID    furosemide  60 mg Intravenous BID    lidocaine  1 patch Transdermal Q24H    polyethylene glycol  17 g Oral Daily    potassium chloride  40 mEq Oral BID    warfarin  2 mg Oral Daily     PRN Meds:albumin human 5%, bisacodyl, dextrose 50%, dextrose 50%, glucagon (human recombinant), glucose, glucose, insulin aspart U-100, metoclopramide HCl, ondansetron, oxyCODONE, oxyCODONE, sodium chloride 0.9%     Objective:     Vital Signs (Most Recent):  Temp: 97.8 °F (36.6 °C)  (05/01/19 0351)  Pulse: 69 (05/01/19 0827)  Resp: 18 (05/01/19 0827)  BP: (!) 107/55 (05/01/19 0827)  SpO2: (!) 94 % (05/01/19 0827) Vital Signs (24h Range):  Temp:  [97 °F (36.1 °C)-98.4 °F (36.9 °C)] 97.8 °F (36.6 °C)  Pulse:  [53-75] 69  Resp:  [15-30] 18  SpO2:  [93 %-99 %] 94 %  BP: ()/(50-64) 107/55     Weight: 76.9 kg (169 lb 8.5 oz)  Body mass index is 26.55 kg/m².    SpO2: (!) 94 %  O2 Device (Oxygen Therapy): nasal cannula    Intake/Output - Last 3 Shifts       04/29 0700 - 04/30 0659 04/30 0700 - 05/01 0659 05/01 0700 - 05/02 0659    P.O. 830 920 75    I.V. (mL/kg) 213 (2.6)      Total Intake(mL/kg) 1043 (12.8) 920 (12) 75 (1)    Urine (mL/kg/hr) 1625 (0.8) 2500 (1.4)     Chest Tube 190 50     Total Output 1815 2550     Net -772 -1630 +75           Urine Occurrence 1 x            Lines/Drains/Airways     Peripheral Intravenous Line                 Peripheral IV - Single Lumen 04/28/19 1040 20 G Left Antecubital 2 days                Physical Exam   Constitutional: She is oriented to person, place, and time. She appears well-developed and well-nourished.   Cardiovascular: Normal rate, regular rhythm and normal heart sounds.   Pulmonary/Chest: Effort normal and breath sounds normal.   Abdominal: Soft.   Neurological: She is alert and oriented to person, place, and time.   Skin: Skin is warm, dry and intact.   Psychiatric: She has a normal mood and affect.       Significant Labs:  BMP:   Recent Labs   Lab 05/01/19  0424   *      K 3.9   CL 95   CO2 31*   BUN 12   CREATININE 0.6   CALCIUM 9.1   MG 1.7     CBC:   Recent Labs   Lab 05/01/19  0424   WBC 5.62   RBC 2.98*   HGB 9.3*   HCT 29.3*   *   MCV 98   MCH 31.2*   MCHC 31.7*     Coagulation:   Recent Labs   Lab 04/30/19  0451 05/01/19  0616   INR 1.6* 1.6*   APTT 29.7  --        Significant Diagnostics:  CXR: I have reviewed all pertinent results/findings within the past 24 hours    Assessment/Plan:     * Mitral stenosis  Ericka LEWIS  Debbie is a 64 y.o. female s/p redo Mitral Valve Repair on 4/26 with Dr. Mcintosh.     Neuro:   - PO pain meds PRN  - PRN dilaudid      Pulmonary:   - RA  - IS/Pulm toilet     Cardiac:   - Backup pacer off  - Coumadin 4mg tonight   - Amio 200 BID  - Holding BB due to bradycardia  - TTE today     Renal:    - BUN/Cr normal preop  - Lasix 60 BID     ID:   - Periop ancef     Hem/Onc:   -Stable H/H  -Protamine given 4/26 for rebound heparin action     Endocrine:    - low dose SSI  - following endo recs     Fluids/Electrolytes/Nutrition/GI:   - Replace electrolytes PRN  - cardiac diet     PPx:  - DVT: SCDs  - Bowel: PPI     DISPO: Discharge tomorrow              Jennifer Roger NP  Cardiothoracic Surgery  [facility]

## 2019-05-01 NOTE — CARE UPDATE
Reviewed BG levels and insulin regimen.     Goal BG while inpatient 140-180 mg/dl  Current BG levels are below goal without the need for SQ correction scale.     Recommendations:  -Continue low dose correction scale  -Blood glucose monitoring AC/HS

## 2019-05-01 NOTE — SUBJECTIVE & OBJECTIVE
Interval History: Pt transferred from ICU last night. Pt has no complaints waiting for INR to be therapeutic. NSR on monitor.     Review of Systems   Constitution: Negative for decreased appetite, fever and malaise/fatigue.   Cardiovascular: Negative for chest pain, claudication, dyspnea on exertion, irregular heartbeat, leg swelling, palpitations and syncope.   Respiratory: Negative for cough and shortness of breath.    Hematologic/Lymphatic: Negative for bleeding problem.   Skin: Negative for rash.   Musculoskeletal: Negative for arthritis and myalgias.   Gastrointestinal: Negative for abdominal pain, diarrhea, melena, nausea and vomiting.   Genitourinary: Negative for dysuria.   Neurological: Negative for headaches, paresthesias and seizures.     Medications:  Continuous Infusions:  Scheduled Meds:   acetaminophen  650 mg Oral Q6H    amiodarone  200 mg Oral BID    aspirin  81 mg Oral Daily    docusate sodium  100 mg Oral BID    furosemide  60 mg Intravenous BID    lidocaine  1 patch Transdermal Q24H    polyethylene glycol  17 g Oral Daily    potassium chloride  40 mEq Oral BID    warfarin  2 mg Oral Daily     PRN Meds:albumin human 5%, bisacodyl, dextrose 50%, dextrose 50%, glucagon (human recombinant), glucose, glucose, insulin aspart U-100, metoclopramide HCl, ondansetron, oxyCODONE, oxyCODONE, sodium chloride 0.9%     Objective:     Vital Signs (Most Recent):  Temp: 97.8 °F (36.6 °C) (05/01/19 0351)  Pulse: 69 (05/01/19 0827)  Resp: 18 (05/01/19 0827)  BP: (!) 107/55 (05/01/19 0827)  SpO2: (!) 94 % (05/01/19 0827) Vital Signs (24h Range):  Temp:  [97 °F (36.1 °C)-98.4 °F (36.9 °C)] 97.8 °F (36.6 °C)  Pulse:  [53-75] 69  Resp:  [15-30] 18  SpO2:  [93 %-99 %] 94 %  BP: ()/(50-64) 107/55     Weight: 76.9 kg (169 lb 8.5 oz)  Body mass index is 26.55 kg/m².    SpO2: (!) 94 %  O2 Device (Oxygen Therapy): nasal cannula    Intake/Output - Last 3 Shifts       04/29 0700 - 04/30 0659 04/30 0700 - 05/01  0659 05/01 0700 - 05/02 0659    P.O. 830 920 75    I.V. (mL/kg) 213 (2.6)      Total Intake(mL/kg) 1043 (12.8) 920 (12) 75 (1)    Urine (mL/kg/hr) 1625 (0.8) 2500 (1.4)     Chest Tube 190 50     Total Output 1815 2550     Net -772 -1630 +75           Urine Occurrence 1 x            Lines/Drains/Airways     Peripheral Intravenous Line                 Peripheral IV - Single Lumen 04/28/19 1040 20 G Left Antecubital 2 days                Physical Exam   Constitutional: She is oriented to person, place, and time. She appears well-developed and well-nourished.   Cardiovascular: Normal rate, regular rhythm and normal heart sounds.   Pulmonary/Chest: Effort normal and breath sounds normal.   Abdominal: Soft.   Neurological: She is alert and oriented to person, place, and time.   Skin: Skin is warm, dry and intact.   Psychiatric: She has a normal mood and affect.       Significant Labs:  BMP:   Recent Labs   Lab 05/01/19  0424   *      K 3.9   CL 95   CO2 31*   BUN 12   CREATININE 0.6   CALCIUM 9.1   MG 1.7     CBC:   Recent Labs   Lab 05/01/19  0424   WBC 5.62   RBC 2.98*   HGB 9.3*   HCT 29.3*   *   MCV 98   MCH 31.2*   MCHC 31.7*     Coagulation:   Recent Labs   Lab 04/30/19  0451 05/01/19  0616   INR 1.6* 1.6*   APTT 29.7  --        Significant Diagnostics:  CXR: I have reviewed all pertinent results/findings within the past 24 hours

## 2019-05-01 NOTE — PLAN OF CARE
Problem: Occupational Therapy Goal  Goal: Occupational Therapy Goal  Goals to be met by: 5/13/19     Patient will increase functional independence with ADLs by performing:    UE Dressing with Modified [place].  LE Dressing with Modified [place].  Grooming while standing at sink with Modified [place].  Toileting from toilet with Modified [place] for hygiene and clothing management.   Bathing from  shower chair/bench with Modified [place].  Toilet transfer to toilet with Modified [place].  Increased functional strength to WFL for B UE.  Upper extremity exercise program x15 reps per handout, with independence.     Outcome: Ongoing (interventions implemented as appropriate)  Pt with adequate goal attainment and no further acute OT needs at this time.

## 2019-05-01 NOTE — PLAN OF CARE
05/01/19 1327   Discharge Assessment   Assessment Type Discharge Planning Reassessment   Discharge Plan A Home with family   DME Needed Upon Discharge  none   Patient/Family in Agreement with Plan yes     Pt is s/p MVR redo. She will need to monitored for 3 months post surgery for coumadin/INR. Called Dr. Nunez's office and spoke with Carmela [phone]. The patient will report directly to the [place] office for INR lab draws for adjustments in her dose if needed. Her INR is 1.6 today, Goal is 2-3 for 3 months. Will fax H&P, Operative Note, and D/C Summary to [phone] attn: Carmela/Dr. Nunez. No other discharge need assessed at this time. Pt will discharge to home in care of her spouse. No pharmacy is listed in the computer, since the patient [place] out of state in [place] depending on time of discharge would recommend filling coumadin and any new medications at [facility] to ensure she has her medications prior to discharge.

## 2019-05-01 NOTE — PROGRESS NOTES
"[facility]  Adult Nutrition  Progress Note    SUMMARY       Recommendations  Recommendation/Intervention:   1. Encourage increased PO intake as tolerated.   2. Recommend adding Boost Plus OS to all meals.   RD to monitor.    Goals: Patient to consume > 75% of meals  Nutrition Goal Status: new  Communication of RD Recs: reviewed with RN    Reason for Assessment  Reason For Assessment: length of stay  Diagnosis: surgery/postoperative complications(s/p MVR & MAZE 4/26)  Relevant Medical History: mitral stenosis, HLD, HTN  General Information Comments: Stepped down from ICU yesterday. Diet started 4/28, not much PO intake yet. (Patient appears nourished, currently with no physical signs of malnutrition, and no weight loss PTA. RD does not feel patient meets crtieria for malnutrition at this time.)  Nutrition Discharge Planning: Adequate nutrition via PO intake.    Nutrition Risk Screen  Nutrition Risk Screen: no indicators present    Nutrition/Diet History  Spiritual, Cultural Beliefs, Spiritism Practices, Values that Affect Care: no  Factors Affecting Nutritional Intake: decreased appetite    Anthropometrics  Temp: 97.8 °F (36.6 °C)  Height Method: Stated  Height: 5' 7" (170.2 cm)  Height (inches): 67 in  Weight Method: Standard Scale  Weight: 76.9 kg (169 lb 8.5 oz)  Weight (lb): 169.54 lb  Ideal Body Weight (IBW), Female: 135 lb  % Ideal Body Weight, Female (lb): 123.7 lb  BMI (Calculated): 26.2  BMI Grade: 25 - 29.9 - overweight    Lab/Procedures/Meds  Pertinent Labs Reviewed: reviewed  Pertinent Labs Comments: Glu 111  Pertinent Medications Reviewed: reviewed  Pertinent Medications Comments: docusate, lasix, coumadin    Estimated/Assessed Needs  Weight Used For Calorie Calculations: 76.9 kg (169 lb 8.5 oz)  Energy Calorie Requirements (kcal): 1703 kcal/day  Energy Need Method: [place](x 1.25)  Protein Requirements: 77-92 g/day(1.0-1.2 g/kg)  Weight Used For Protein Calculations: 76.9 kg " (169 lb 8.5 oz)  Fluid Requirements (mL): 1 mL/kcal or per MD  Estimated Fluid Requirement Method: RDA Method  RDA Method (mL): 1703    Nutrition Prescription Ordered  Current Diet Order: Cardiac    Evaluation of Received Nutrient/Fluid Intake  I/O: -1.6L x 24hrs, -2.6L since admit  Comments: LBM 4/24  % Intake of Estimated Energy Needs: 25 - 50 %  % Meal Intake: 25 - 50 %    Nutrition Risk  Level of Risk/Frequency of Follow-up: low(1x/week)     Assessment and Plan  Nutrition Problem  Inadequate energy intake    Related to (etiology):   Decreased appetite    Signs and Symptoms (as evidenced by):   PO intake < 75% of meals    Interventions/Recommendations (treatment strategy):  Collaboration and referral of nutrition care    Nutrition Diagnosis Status:   New    Monitor and Evaluation  Food and Nutrient Intake: energy intake, food and beverage intake  Food and Nutrient Adminstration: diet order  Physical Activity and Function: nutrition-related ADLs and IADLs  Anthropometric Measurements: weight, weight change  Biochemical Data, Medical Tests and Procedures: electrolyte and renal panel, gastrointestinal profile, inflammatory profile  Nutrition-Focused Physical Findings: overall appearance     Nutrition Follow-Up  RD Follow-up?: Yes

## 2019-05-01 NOTE — PLAN OF CARE
Problem: Adult Inpatient Plan of Care  Goal: Plan of Care Review  Pt remains free of falls or injuries. Pain level controlled with medications per orders.  at bedside, family and pt educated on plan of care.  Pt voices understanding. Incision well approximated, oxygen level maintained with NC while sleeping. Vital signs WNL. Will continue to monitor.

## 2019-05-01 NOTE — PT/OT/SLP PROGRESS
Date of Service: 04/06/2017    PREOPERATIVE DIAGNOSIS:  Right knee patellar maltracking.    POSTOPERATIVE DIAGNOSIS:  Right knee patellar maltracking and grade 2 chondromalacia medial femoral condyle.    PROCEDURE PERFORMED:  Right knee arthroscopy with medial patellofemoral ligament reconstruction, lateral retinacular release and chondroplasty of medial femoral condyle.    SURGEON:  Umesh Fernandez MD.    ASSISTANT:  Kendall Lopez PA-C.     ANESTHESIA:  General and regional.    ESTIMATED BLOOD LOSS:  Minimal.    COMPLICATIONS:  None.    ANESTHESIOLOGIST:  Dr. Barros.    OPERATIVE INDICATIONS:  Isha is a very pleasant 22-year-old female with a longstanding history of right knee pain.  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  She has done extensive physical therapy with no improvement.  Physical examination had demonstrated lateralize patellar tracking.  She has a history 2 patellar dislocations.  We discussed further conservative management, surgical treatment.  We discussed risks and benefits of surgery, including, but not limited to, bleeding, infection, neurovascular injury, stiffness, arthritis, recurrent patellar dislocation, blood clot, pulmonary embolism and the risks associated with anesthesia.  She demonstrates understanding of these risks and elected to proceed surgically.    OPERATIVE PROCEDURE:  On the day of surgery, the patient was identified in the preoperative holding area and the operative site was marked.  She received 2 grams of Ancef for procedure prophylaxis.  A regional block was placed for anesthesia under ultrasound guidance.  She was taken to the operating room, positioned supine on the operating table with all bony prominences well padded, general anesthesia induced.      Examination under anesthesia demonstrated that I was unable to dislocate her patella.  She did have lateralized tracking of the patella.  A  Occupational Therapy   Treatment & Discharge     Name: Ericka Lewis  MRN: [id number]  Admitting Diagnosis:  Mitral stenosis  5 Days Post-Op    Recommendations:     Discharge Recommendations: home  Discharge Equipment Recommendations:  none  Barriers to discharge:  None    Assessment:     Ericka Lewis is a 64 y.o. female with a medical diagnosis of Mitral stenosis. Pt with adequate goal attainment and no further acute OT needs at this time.     Plan:     · Patient d/c'ed from OT 5/1/2019  · Plan of Care Expires: 05/01/19  · Plan of Care Reviewed with: patient    Subjective     Pain/Comfort:  · Pain Rating 1: (pt reporting 8/10 chest pain)  · Pain Addressed 1: Reposition, Distraction, Pre-medicate for activity  · Pain Rating Post-Intervention 1: (no increase in pain reported )    Objective:     Communicated with: RN prior to session.  Patient found up in chair with telemetry, peripheral IV upon OT entry to room.    General Precautions: Standard, fall, sternal   Orthopedic Precautions:N/A   Braces: N/A     Occupational Performance:    Bed Mobility:   · N/A as pt found seated in chair     Transfers:   · Sit<>Stand Transfer: supervision from/onto chair without any AD     · Good adherence to sternal precautions     Functional Mobility: Pt engaging in functional mobility to simulate household distances within the room with supervision and utilizing no AD in order to maximize functional activity tolerance and standing balance required for engagement in occupations of choice.   · No instability or LOB noted      Activities of Daily Living:  · LB Dressing: supervision    · Via 4 point position to access BLE's to adjust socks   · supervision for standing balance     Therapeutic Intervention & Education:  · Pt with ability to verbalize and adhere to 3/3 sternal precautions   Pt engaging in standing dynamic reaching activity to retrieve ADL items from varying heights throughout the room in prep for grooming routine   Pt able  surgical timeout was performed confirming patient, planned procedure, operative site and operative instrumentation.  The right lower extremity was prepped and draped in standard sterile fashion.  The bony landmarks were demarcated on the skin.    An anterolateral arthroscopy portal was created and the 4 mm arthroscope introduced in the knee.  A diagnostic arthroscopy ensued.  Visualization of the patella demonstrated pristine articular cartilage of the patella and trochlea.  From the lateral gutter the patella was about 30% translated overhanging laterally.  The medial compartment demonstrated intact medial meniscus.  The medial tibial plateau articular cartilage was also pristine.  There was grade 2 change on the medial femoral condyle from a thickened medial plica.  This had significant fibrillation and fraying.  The arthroscopic shaver and ArthroCare wand were used to perform a chondroplasty of the medial femoral condyle and debride the plica and hypertrophic fat pad.  The scope was introduced into the intracondylar notch and the ACL was pristine.  The PCL was also pristine.  The knee was placed in a figure 4 position and the arthroscope placed in the lateral compartment.  The lateral meniscus as well as the articular surface of the lateral compartment were both seen.  Popliteus tendon was intact and pristine.    At this point, the arthroscope was placed in the superomedial portal with a 70-degree scope to evaluate patellar tracking.  The patella was visualized to rest slightly lateralized.  At this point, the arthroscope was placed back into the anterolateral portal and a lateral retinacular release was performed.  A spinal needle was used to localize the proximal nature of this release and a longitudinal incision was made about 1 cm lateral to the patella.  This was taken down, not all the way to the subcutaneous fat.  Visible release was identified.  Hemostasis was achieved.  At this point, all debris was  to obtain 3/3 items with supervision and no UE support   Incorporating the following movement pattens: trunk rotation, anterior trunk flexion, lateral trunk flexion, knee flexion, shoulder flexion  Reaching outside of HARSHAD, overhead, and below knee level  No outright LOB noted    · Cognition: Pt alert and oriented with good insight into condition   · Updated communication board with level of assist required (supervision x 1 person assistance & no AD) & educated RN/patient that pt is appropriate for functional mobility in hallway and all ADLs with RN/PCT   · Educated on importance of EOB/OOB mobility, maintaining routine, sitting up in chair, and maximizing independence with ADLs during admission   · RN present during session    [facility] 6 Click ADL: 22    Patient left up in chair with all lines intact, call button in reach, RN notified and spouse  presentEducation:      GOALS:   Multidisciplinary Problems     Occupational Therapy Goals        Problem: Occupational Therapy Goal    Goal Priority Disciplines Outcome Interventions   Occupational Therapy Goal     OT, PT/OT Ongoing (interventions implemented as appropriate)    Description:  Goals to be met by: 5/13/19     Patient will increase functional independence with ADLs by performing:    UE Dressing with Modified [place].  LE Dressing with Modified [place].  Grooming while standing at sink with Modified [place].  Toileting from toilet with Modified [place] for hygiene and clothing management.   Bathing from  shower chair/bench with Modified [place].  Toilet transfer to toilet with Modified [place].  Increased functional strength to WFL for B UE.  Upper extremity exercise program x15 reps per handout, with independence.                      Time Tracking:     OT Date of Treatment: 05/01/19  OT Start Time: 0826  OT Stop Time: 0834  OT Total Time (min): 8 min    Billable Minutes:Therapeutic Activity 8 minutes    Damari Martinez  OT  5/1/2019     removed from the knee joint and attention was turned to the MPFL reconstruction.    A 4 cm incision was made over the superior medial pole of the patella.  Dissection was carried down to the bony patella.  The VMO was slightly elevated.  The first guide pin for the Arthrex MPFL kit was used parallel to the patella about 4 mm distal to the proximal pole.  AP and lateral views were used to confirm the trajectory and placement of this.  A second guide pin was placed about 18 mm distal to the first, again, using fluoroscopic guidance to confirm trajectory.  Once I was satisfied with this, the 4.5 mm reamer was taken to a depth of 25 mm.  While this was happening, the semitendinosis allograft was fine in room temperature water.  Once this was completely thawed, the graft was prepped with a 2-0 FiberWire stitch on each end of the graft in a whipstitch configuration.  At this point, a perfect lateral fluoroscopic view of the knee was obtained and Schottle's point was identified.  This was located 1 mm anterior to the posterior cortical line, 2.5 mm anterior to the Blumensaat line and 1 mm distal to the posterior condylar line.  A small stab 2 cm incision was created here and the guide pin was placed starting at this point.  It was advanced bicortically, the femur angled slightly proximally and anteriorly to avoid the intercondylar notch.  This was advanced out the lateral side of the knee.  At this point, a hemostat was used to dissect beneath the VMO but extraarticularly in the knee.  A passing stitch was advanced.  The SwiveLock anchors were advanced to the patellar sockets, advancing each limb of the graft into their socket.  Care was taken to make sure at least 20 mm of each graft was in the socket.  Excellent fixation was achieved.  The passing stitch was then loaded around the U of the graft and tunneled underneath the skin and out the medial femoral incision.  The 6 mm reamer was taken over the guide pin to a depth  of 15 mm.  This was removed and the nitinol wire placed into the femoral socket.  At this point, the passing stitch was pulled out through the femoral tunnel and out the lateral side of the femur.  The graft was visualized entering the socket and a 6 mm Bio-Tenodesis screw was placed with the knee in 30 degrees of flexion.  While this in place, the patella was being held laterally to avoid over-tensioning.  Once this was complete, the knee was taken through a complete range of motion confirming that there was full extension and full flexion, which there was.  There was good isometry of the graft.  At this point, attention was turned to closure.      All wounds were copiously irrigated.  The arthroscope was returned to the joint to evaluate the lateral overhang of the patella, which was now parallel with the lateral femoral condyle.  The portals were closed with 2-0 Vicryl in running Monocryl stitch.  Sterile dressings were applied.  She was then awakened and transferred to recovery room in stable condition.  She tolerated the procedure well.    A Physician Assistant was utilized in the case due to the highly complex nature.  This was medically necessary as a skilled set of hands was required for appropriate placement of the graft and prep of the graft.      Dictated By: Umesh Fernandez MD  Signing Provider: MD BRYAN Collins/therese ([id number])  DD: 04/06/2017 19:25:48 TD: 04/06/2017 20:04:51    Copy Sent To:

## 2019-05-01 NOTE — PLAN OF CARE
Problem: Adult Inpatient Plan of Care  Goal: Plan of Care Review  Outcome: Ongoing (interventions implemented as appropriate)  Pt lying in bed. AAOX4. SR on monitor. VS stable. Pt sats 93% on room air. PO pain meds given as ordered prn. Potassium replacement given as scheduled. Pt with no distress. Call light in reach,  at bedside.

## 2019-05-01 NOTE — PLAN OF CARE
Problem: Adult Inpatient Plan of Care  Goal: Plan of Care Review  Recommendations  Recommendation/Intervention:   1. Encourage increased PO intake as tolerated.   2. Recommend adding Boost Plus OS to all meals.   RD to monitor.    Goals: Patient to consume > 75% of meals  Nutrition Goal Status: new    Full assessment completed, see RD Note 5/1/2019.

## 2019-05-01 NOTE — ASSESSMENT & PLAN NOTE
Ericka Lewis is a 64 y.o. female s/p redo Mitral Valve Repair on 4/26 with Dr. Mcintosh.     Neuro:   - PO pain meds PRN  - PRN dilaudid      Pulmonary:   - RA  - IS/Pulm toilet     Cardiac:   - Backup pacer off  - Coumadin 2 scheduled   - Amio 200 BID  - Holding BB due to bradycardia  - TTE today     Renal:    - BUN/Cr normal preop  - Lasix 60 BID     ID:   - Periop ancef     Hem/Onc:   -Stable H/H  -Protamine given 4/26 for rebound heparin action     Endocrine:    - low dose SSI  - following endo recs     Fluids/Electrolytes/Nutrition/GI:   - Replace electrolytes PRN  - cardiac diet     PPx:  - DVT: SCDs  - Bowel: PPI     DISPO: Discharge tomorrow

## 2019-05-02 VITALS
WEIGHT: 166.31 LBS | HEIGHT: 67 IN | HEART RATE: 54 BPM | OXYGEN SATURATION: 92 % | RESPIRATION RATE: 18 BRPM | DIASTOLIC BLOOD PRESSURE: 57 MMHG | SYSTOLIC BLOOD PRESSURE: 111 MMHG | BODY MASS INDEX: 26.1 KG/M2 | TEMPERATURE: 98 F

## 2019-05-02 PROBLEM — Z98.890 S/P MITRAL VALVE REPAIR: Status: RESOLVED | Noted: 2019-04-08 | Resolved: 2019-05-02

## 2019-05-02 PROBLEM — Z95.2 S/P MVR (MITRAL VALVE REPLACEMENT): Status: ACTIVE | Noted: 2019-05-02

## 2019-05-02 PROBLEM — E87.1 HYPONATREMIA: Status: ACTIVE | Noted: 2019-05-02

## 2019-05-02 PROBLEM — E87.6 HYPOKALEMIA: Status: ACTIVE | Noted: 2019-05-02

## 2019-05-02 LAB
ANION GAP SERPL CALC-SCNC: 10 MMOL/L (ref 8–16)
BASOPHILS # BLD AUTO: 0.07 K/UL (ref 0–0.2)
BASOPHILS NFR BLD: 1.2 % (ref 0–1.9)
BUN SERPL-MCNC: 10 MG/DL (ref 8–23)
CALCIUM SERPL-MCNC: 9 MG/DL (ref 8.7–10.5)
CHLORIDE SERPL-SCNC: 92 MMOL/L (ref 95–110)
CO2 SERPL-SCNC: 32 MMOL/L (ref 23–29)
CREAT SERPL-MCNC: 0.7 MG/DL (ref 0.5–1.4)
DIFFERENTIAL METHOD: ABNORMAL
EOSINOPHIL # BLD AUTO: 0.4 K/UL (ref 0–0.5)
EOSINOPHIL NFR BLD: 7.4 % (ref 0–8)
ERYTHROCYTE [DISTWIDTH] IN BLOOD BY AUTOMATED COUNT: 12.8 % (ref 11.5–14.5)
EST. GFR  (AFRICAN AMERICAN): >60 ML/MIN/1.73 M^2
EST. GFR  (NON AFRICAN AMERICAN): >60 ML/MIN/1.73 M^2
GLUCOSE SERPL-MCNC: 116 MG/DL (ref 70–110)
HCT VFR BLD AUTO: 26.9 % (ref 37–48.5)
HGB BLD-MCNC: 8.8 G/DL (ref 12–16)
IMM GRANULOCYTES # BLD AUTO: 0.03 K/UL (ref 0–0.04)
IMM GRANULOCYTES NFR BLD AUTO: 0.5 % (ref 0–0.5)
INR PPP: 1.7 (ref 0.8–1.2)
LYMPHOCYTES # BLD AUTO: 1.3 K/UL (ref 1–4.8)
LYMPHOCYTES NFR BLD: 20.9 % (ref 18–48)
MAGNESIUM SERPL-MCNC: 1.6 MG/DL (ref 1.6–2.6)
MCH RBC QN AUTO: 30.7 PG (ref 27–31)
MCHC RBC AUTO-ENTMCNC: 32.7 G/DL (ref 32–36)
MCV RBC AUTO: 94 FL (ref 82–98)
MONOCYTES # BLD AUTO: 0.8 K/UL (ref 0.3–1)
MONOCYTES NFR BLD: 12.5 % (ref 4–15)
NEUTROPHILS # BLD AUTO: 3.4 K/UL (ref 1.8–7.7)
NEUTROPHILS NFR BLD: 57.5 % (ref 38–73)
NRBC BLD-RTO: 0 /100 WBC
PHOSPHATE SERPL-MCNC: 3.5 MG/DL (ref 2.7–4.5)
PLATELET # BLD AUTO: 175 K/UL (ref 150–350)
PMV BLD AUTO: 10.8 FL (ref 9.2–12.9)
POTASSIUM SERPL-SCNC: 3.4 MMOL/L (ref 3.5–5.1)
PROTHROMBIN TIME: 16.4 SEC (ref 9–12.5)
RBC # BLD AUTO: 2.87 M/UL (ref 4–5.4)
SODIUM SERPL-SCNC: 134 MMOL/L (ref 136–145)
WBC # BLD AUTO: 5.98 K/UL (ref 3.9–12.7)

## 2019-05-02 PROCEDURE — 83735 ASSAY OF MAGNESIUM: CPT

## 2019-05-02 PROCEDURE — 36415 COLL VENOUS BLD VENIPUNCTURE: CPT

## 2019-05-02 PROCEDURE — 25000003 PHARM REV CODE 250: Performed by: STUDENT IN AN ORGANIZED HEALTH CARE EDUCATION/TRAINING PROGRAM

## 2019-05-02 PROCEDURE — 85610 PROTHROMBIN TIME: CPT

## 2019-05-02 PROCEDURE — 25000003 PHARM REV CODE 250: Performed by: THORACIC SURGERY (CARDIOTHORACIC VASCULAR SURGERY)

## 2019-05-02 PROCEDURE — 99900037 HC PT THERAPY SCREENING (STAT)

## 2019-05-02 PROCEDURE — 80048 BASIC METABOLIC PNL TOTAL CA: CPT

## 2019-05-02 PROCEDURE — 25000003 PHARM REV CODE 250: Performed by: NURSE PRACTITIONER

## 2019-05-02 PROCEDURE — 85025 COMPLETE CBC W/AUTO DIFF WBC: CPT

## 2019-05-02 PROCEDURE — 84100 ASSAY OF PHOSPHORUS: CPT

## 2019-05-02 PROCEDURE — 63600175 PHARM REV CODE 636 W HCPCS: Performed by: THORACIC SURGERY (CARDIOTHORACIC VASCULAR SURGERY)

## 2019-05-02 RX ORDER — AMIODARONE HYDROCHLORIDE 200 MG/1
200 TABLET ORAL DAILY
Qty: 30 TABLET | Refills: 11 | Status: SHIPPED | OUTPATIENT
Start: 2019-05-02 | End: 2020-05-01

## 2019-05-02 RX ORDER — FUROSEMIDE 40 MG/1
40 TABLET ORAL DAILY
Qty: 10 TABLET | Refills: 1 | Status: SHIPPED | OUTPATIENT
Start: 2019-05-02 | End: 2019-06-13 | Stop reason: ALTCHOICE

## 2019-05-02 RX ORDER — OXYCODONE HYDROCHLORIDE 5 MG/1
5 TABLET ORAL
Qty: 42 TABLET | Refills: 0 | Status: SHIPPED | OUTPATIENT
Start: 2019-05-02 | End: 2019-05-09

## 2019-05-02 RX ORDER — POTASSIUM CHLORIDE 20 MEQ/1
40 TABLET, EXTENDED RELEASE ORAL ONCE
Status: COMPLETED | OUTPATIENT
Start: 2019-05-02 | End: 2019-05-02

## 2019-05-02 RX ORDER — DOCUSATE SODIUM 100 MG/1
100 CAPSULE, LIQUID FILLED ORAL 2 TIMES DAILY
Refills: 0 | COMMUNITY
Start: 2019-05-02 | End: 2019-06-13 | Stop reason: ALTCHOICE

## 2019-05-02 RX ORDER — WARFARIN 3 MG/1
3 TABLET ORAL DAILY
Qty: 90 TABLET | Refills: 3 | Status: SHIPPED | OUTPATIENT
Start: 2019-05-02 | End: 2020-05-01

## 2019-05-02 RX ORDER — LANOLIN ALCOHOL/MO/W.PET/CERES
400 CREAM (GRAM) TOPICAL ONCE
Status: COMPLETED | OUTPATIENT
Start: 2019-05-02 | End: 2019-05-02

## 2019-05-02 RX ORDER — POTASSIUM CHLORIDE 20 MEQ/1
20 TABLET, EXTENDED RELEASE ORAL DAILY
Qty: 10 TABLET | Refills: 1 | Status: SHIPPED | OUTPATIENT
Start: 2019-05-02 | End: 2019-05-12

## 2019-05-02 RX ADMIN — MAGNESIUM OXIDE TAB 400 MG (241.3 MG ELEMENTAL MG) 400 MG: 400 (241.3 MG) TAB at 09:05

## 2019-05-02 RX ADMIN — LIDOCAINE 1 PATCH: 50 PATCH TOPICAL at 09:05

## 2019-05-02 RX ADMIN — FUROSEMIDE 60 MG: 10 INJECTION, SOLUTION INTRAMUSCULAR; INTRAVENOUS at 09:05

## 2019-05-02 RX ADMIN — POTASSIUM CHLORIDE 40 MEQ: 1500 TABLET, EXTENDED RELEASE ORAL at 09:05

## 2019-05-02 RX ADMIN — ASPIRIN 81 MG: 81 TABLET, COATED ORAL at 09:05

## 2019-05-02 RX ADMIN — ACETAMINOPHEN 650 MG: 325 TABLET ORAL at 06:05

## 2019-05-02 RX ADMIN — OXYCODONE HYDROCHLORIDE 5 MG: 5 TABLET ORAL at 02:05

## 2019-05-02 RX ADMIN — OXYCODONE HYDROCHLORIDE 10 MG: 10 TABLET ORAL at 09:05

## 2019-05-02 RX ADMIN — AMIODARONE HYDROCHLORIDE 200 MG: 200 TABLET ORAL at 09:05

## 2019-05-02 NOTE — DISCHARGE SUMMARY
[facility]  Cardiothoracic Surgery  Discharge Summary      Patient Name: Ericka Lewis  MRN: [id number]  Admission Date: 4/21/2019  Hospital Length of Stay: 11 days  Discharge Date and Time:  05/02/2019 10:58 AM  Attending Physician: Yonatan Mcintosh MD   Discharging Provider: Jennifer Roger NP  Primary Care Provider: Primary Doctor No    HPI:   Ms. Lewis is a 64 year-old woman with a history of mitral valve prolapse s/p mitral valve repair (May 2018) and now with severe mitral stenosis, as well as paroxysmal atrial fibrillation.  Recently, she has been symptomatic with dyspnea on exertion, lower extremity edema, and orthopnea symptoms interfering with her quality of life.  Her most recent echocardiogram (4/8/2019) showed 55% ejection fraction, severe mitral stenosis with mean transvalvular gradient of 10mmHg, no mitral regurgitation, trace tricuspid regurgitation, and pulmonary hypertension with estimated systolic PA pressure of 42mmHg.  Angiogram showed non-obstructive coronary artery disease with a right dominant system.  CT chest noncontrast showed safe re-entry for reoperation.  We had an extensive discussion with her regarding the ACC/AHA guidelines and we agreed that she has class I indications for surgery involving reoperative sternotomy, mitral valve replacement (bioprosthesis), possible mitral valve repair, possible tricuspid valve repair, and maze procedure with left atrial appendage removal.  The risks and benefits were explained and informed consent was obtained.           Procedure(s) (LRB):  REPLACEMENT, MITRAL VALVE (N/A)  COMPLETE MAZE PROCEDURE USING CRYOBLATION (N/A)  \REPAIR , LEFT ATRIAL APPENDAGE,  (Left)      Indwelling Lines/Drains at time of discharge:   Lines/Drains/Airways          None        Hospital Course: On 4/26/19, the patient was taken to the Operating Room for the above stated procedure. Please see the previously dictated operative report for complete  details. Postoperatively, the patient was taken from the  Operating Room to the ICU where the vital signs were monitored and pain was kept under control. The patient was weaned from the drips and extubated in the ICU per protocol. Once hemodynamically stable, the patient was transferred to the Cardiac Step-Down floor for continued strengthening and ambulation. On postoperative day 6, the patient was ready for discharge to home. At the time of discharge, the patient was ambulating unassisted. Pain was well controlled with oral analgesics and the patient was tolerating the diet.     MOBILITY AND ACTIVITY: As tolerated. Patient may shower. No heavy lifting of greater than 5 pounds and no driving.     DIET: An 1800-calorie ADA with a 1500 mL fluid restriction.     WOUND CARE INSTRUCTIONS: Check for redness, swelling and drainage around the  incision or wound. Patient is to call for any obvious bleeding, drainage, pus from the wound, unusual problems or difficulties or temperature of greater than 101   degrees.     FOLLOWUP: Follow up with Dr. Mcintosh in approximately 6 weeks. Prior to this  appointment, the patient will have a EKG.     Patient not placed on Ace-Inhibitor at the time of discharge due to potential for hypotension. Pt could not tolerate beat blocker due bradycardia.     DISCHARGE CONDITION: At the time of discharge, the patient was in sinus rhythm and afebrile with stable vital signs.      Consults (From admission, onward)        Status Ordering Provider     Consult Case Management/Social Work  Once     Provider:  (Not yet assigned)    Acknowledged RON SOUZA     Consult to Endocrinology  Once     Provider:  (Not yet assigned)    Completed RON SOUZA     Inpatient consult to Interventional Cardiology  Once     Provider:  (Not yet assigned)    Completed GURMEET ACKERMAN          Pending Diagnostic Studies:     Procedure Component Value Units Date/Time    Transthoracic echo (TTE)  "complete (Cupid Only) [[id number]]     Order Status:  Sent Lab Status:  No result             Final Active Diagnoses:    Diagnosis Date Noted POA    PRINCIPAL PROBLEM:  S/P MVR (mitral valve replacement) [Z95.2] 05/02/2019 Not Applicable    Hypokalemia [E87.6] 05/02/2019 No    Hyponatremia [E87.1] 05/02/2019 No    Arrhythmia [I49.9]  Yes    Mitral regurgitation [I34.0]  Yes    Hyperglycemia [R73.9] 04/26/2019 No    Mitral stenosis [I05.0] 04/21/2019 Yes      Problems Resolved During this Admission:    Diagnosis Date Noted Date Resolved POA    S/P mitral valve repair [Z98.890] 04/08/2019 05/02/2019 Not Applicable      Discharged Condition: stable    Disposition: Home or Self Care    Follow Up:  Follow-up Information     Yonatan Mcintosh MD In 6 weeks.    Specialty:  Cardiothoracic Surgery  Contact information:  [address] [place] [address] [id number] [phone]                 Patient Instructions:      WALKER FOR HOME USE     Order Specific Question Answer Comments   Type of Walker: Adult (5'4"-6'6")    With wheels? Yes    Height: 5' 7" (1.702 m)    Weight: 81.3 kg (179 lb 3.7 oz)    Length of need (1-99 months): 2    Platform attachment: Bilateral    Does patient have medical equipment at home? none    Please check all that apply: Patient is unable to safely ambulate without equipment.      Medications:  Reconciled Home Medications:      Medication List      START taking these medications    amiodarone 200 MG Tab  Commonly known as:  PACERONE  Take 1 tablet (200 mg total) by mouth once daily.     docusate sodium 100 MG capsule  Commonly known as:  COLACE  Take 1 capsule (100 mg total) by mouth 2 (two) times daily.     oxyCODONE 5 MG immediate release tablet  Commonly known as:  ROXICODONE  Take 1 tablet (5 mg total) by mouth every 3 (three) hours as needed.     warfarin 3 MG tablet  Commonly known as:  COUMADIN  Take 1 tablet (3 mg total) by mouth Daily.        CHANGE how you take these medications  "   furosemide 40 MG tablet  Commonly known as:  LASIX  Take 1 tablet (40 mg total) by mouth once daily. for 10 days  What changed:  when to take this        CONTINUE taking these medications    aspirin 81 MG EC tablet  Commonly known as:  ECOTRIN  Take 81 mg by mouth once daily.     cholecalciferol (vitamin D3) 2,000 unit Cap  Commonly known as:  VITAMIN D3  Take 2,000 Units by mouth once daily.     diclofenac sodium 1 % Gel  Commonly known as:  VOLTAREN  Apply 2 g topically once daily.     estradiol 1 MG tablet  Commonly known as:  ESTRACE  Take 1 mg by mouth once daily.     fluticasone propionate 50 mcg/actuation nasal spray  Commonly known as:  FLONASE  SHAKE LQ AND U 1 SPR IEN D     lidocaine 5 % Oint ointment  Commonly known as:  XYLOCAINE  REGINALDO EXT AA D     multivitamin per tablet  Commonly known as:  THERAGRAN  Take 1 tablet by mouth once daily.     omeprazole 40 MG capsule  Commonly known as:  PRILOSEC  Take 40 mg by mouth every morning.     potassium chloride SA 20 MEQ tablet  Commonly known as:  K-DUR,KLOR-CON  Take 1 tablet (20 mEq total) by mouth once daily. for 10 days     promethazine 25 MG tablet  Commonly known as:  PHENERGAN  Take 25 mg by mouth every 6 (six) hours as needed.     tiZANidine 4 MG tablet  Commonly known as:  ZANAFLEX  TK 1 T PO QID PRF SPASM        STOP taking these medications    meloxicam 15 MG tablet  Commonly known as:  MOBIC     metoprolol succinate 25 MG 24 hr tablet  Commonly known as:  TOPROL-XL     oxyCODONE-acetaminophen  mg per tablet  Commonly known as:  PERCOCET          Pt will follow up with Dr. DAVONTE Nunez in [place] for coumadin follow up. INR goal 2.0-3.0      Time spent on the discharge of patient: 35 minutes    Jennifer Roger NP  Cardiothoracic Surgery  [facility]

## 2019-05-02 NOTE — PT/OT/SLP PROGRESS
Physical Therapy Discharge      Patient Name:  Ericka Lewis   MRN:  [id number]    Pt found standing helping  pack bags. PT asked pt if she had any concerns with ambulation or stair climbing. Pt reported no concerns. Reviewed sternal precautions with pt. D/c from acute PT 2nd to pt safe with functional mobility.    Marjorie Cartwright, PT, DPT  5/2/2019  [phone]

## 2019-05-02 NOTE — PLAN OF CARE
Problem: Adult Inpatient Plan of Care  Goal: Plan of Care Review  Outcome: Ongoing (interventions implemented as appropriate)  Patient remains free of falls or injury. Patient complains of pain; treated with PO oxycodone. Urine sample sent to lab for c/o burning with urination. TTE completed 5/1/2019; EF 55%. Continued on IVP lasix BID. Sternal precautions maintained. Plan for d/c in AM. Plan of care reviewed with patient and spouse.

## 2019-05-02 NOTE — PROGRESS NOTES
Patient complaint of burning with urination. Dr. Hoffmann notified; order for urinalysis placed. No further orders given at this time. Sample sent to lab. Will continue to monitor.

## 2019-05-02 NOTE — NURSING
Pt dcd home. IV dcd with catheter intact. Discharge instructions including appt time, incision care and medications reviewed.

## 2019-05-06 ENCOUNTER — TELEPHONE (OUTPATIENT)
Dept: CARDIOTHORACIC SURGERY | Facility: CLINIC | Age: 64
End: 2019-05-06

## 2019-05-06 DIAGNOSIS — Z95.2 S/P MVR (MITRAL VALVE REPLACEMENT): Primary | ICD-10-CM

## 2019-05-31 RX ORDER — POTASSIUM CHLORIDE 20 MEQ/1
TABLET, EXTENDED RELEASE ORAL
Qty: 10 TABLET | Refills: 0 | OUTPATIENT
Start: 2019-05-31

## 2019-06-05 ENCOUNTER — TELEPHONE (OUTPATIENT)
Dept: CARDIOTHORACIC SURGERY | Facility: CLINIC | Age: 64
End: 2019-06-05

## 2019-06-05 NOTE — TELEPHONE ENCOUNTER
----- Message from Shannan Chau PA-C sent at 6/5/2019  7:25 AM CDT -----  Contact: pt   According to her discharge summary she was to take lasix for only 10 days and her surgery was 4/26 so she should not be taking potassium unless someone else told her to keep taking it.   ----- Message -----  From: Selin Domingo RN  Sent: 6/4/2019   4:04 PM  To: HERNANDO Rodriguez,  Would you mind checking to see if Ms. Lewis needs a RX or not. See email below. Thanks.  -Selin  ----- Message -----  From: Juan Pablo Ngo  Sent: 6/4/2019  12:23 PM  To: Dayna Tam Staff    Rx Refill/Request     Is this a Refill or New Rx:  Refill     Rx Name and Strength:   Potassium     [facility] Pharm[facility] with phone number:   [facility]    [address] [place] [address] [phone]    Communication Preference: [phone]     Additional Information: Pt calling to confirm if she needs to continue taking the potassium. Caller states she was only given 10 pills and she is now out. Pt states she cannot take her Lasix without her potassium medication.

## 2019-06-13 ENCOUNTER — DOCUMENTATION ONLY (OUTPATIENT)
Dept: CARDIOTHORACIC SURGERY | Facility: CLINIC | Age: 64
End: 2019-06-13

## 2019-06-13 ENCOUNTER — HOSPITAL ENCOUNTER (OUTPATIENT)
Dept: CARDIOLOGY | Facility: CLINIC | Age: 64
Discharge: HOME | End: 2019-06-13
Payer: MEDICARE

## 2019-06-13 ENCOUNTER — OFFICE VISIT (OUTPATIENT)
Dept: CARDIOTHORACIC SURGERY | Facility: CLINIC | Age: 64
End: 2019-06-13
Payer: MEDICARE

## 2019-06-13 VITALS
HEIGHT: 67 IN | OXYGEN SATURATION: 100 % | WEIGHT: 163.13 LBS | BODY MASS INDEX: 25.6 KG/M2 | SYSTOLIC BLOOD PRESSURE: 150 MMHG | DIASTOLIC BLOOD PRESSURE: 69 MMHG | TEMPERATURE: 98 F | HEART RATE: 63 BPM

## 2019-06-13 DIAGNOSIS — Z95.2 S/P MVR (MITRAL VALVE REPLACEMENT): Primary | ICD-10-CM

## 2019-06-13 DIAGNOSIS — Z95.2 S/P MVR (MITRAL VALVE REPLACEMENT): ICD-10-CM

## 2019-06-13 DIAGNOSIS — I48.91 ATRIAL FIBRILLATION, UNSPECIFIED TYPE: ICD-10-CM

## 2019-06-13 PROCEDURE — 99999 PR PBB SHADOW E&M-EST. PATIENT-LVL IV: CPT | Mod: PBBFAC,,, | Performed by: THORACIC SURGERY (CARDIOTHORACIC VASCULAR SURGERY)

## 2019-06-13 PROCEDURE — 93010 EKG 12-LEAD: ICD-10-PCS | Mod: S$PBB,,, | Performed by: INTERNAL MEDICINE

## 2019-06-13 PROCEDURE — 99024 POSTOP FOLLOW-UP VISIT: CPT | Mod: POP,,, | Performed by: THORACIC SURGERY (CARDIOTHORACIC VASCULAR SURGERY)

## 2019-06-13 PROCEDURE — 99024 PR POST-OP FOLLOW-UP VISIT: ICD-10-PCS | Mod: POP,,, | Performed by: THORACIC SURGERY (CARDIOTHORACIC VASCULAR SURGERY)

## 2019-06-13 PROCEDURE — 93010 ELECTROCARDIOGRAM REPORT: CPT | Mod: S$PBB,,, | Performed by: INTERNAL MEDICINE

## 2019-06-13 PROCEDURE — 99214 OFFICE O/P EST MOD 30 MIN: CPT | Mod: PBBFAC,25 | Performed by: THORACIC SURGERY (CARDIOTHORACIC VASCULAR SURGERY)

## 2019-06-13 PROCEDURE — 93005 ELECTROCARDIOGRAM TRACING: CPT | Mod: PBBFAC | Performed by: INTERNAL MEDICINE

## 2019-06-13 PROCEDURE — 99999 PR PBB SHADOW E&M-EST. PATIENT-LVL IV: ICD-10-PCS | Mod: PBBFAC,,, | Performed by: THORACIC SURGERY (CARDIOTHORACIC VASCULAR SURGERY)

## 2019-06-13 RX ORDER — POTASSIUM CHLORIDE 20 MEQ/1
20 TABLET, EXTENDED RELEASE ORAL DAILY
Qty: 28 TABLET | Refills: 2 | Status: SHIPPED | OUTPATIENT
Start: 2019-06-13 | End: 2019-06-27

## 2019-06-13 RX ORDER — FUROSEMIDE 20 MG/1
40 TABLET ORAL DAILY
Qty: 56 TABLET | Refills: 2 | Status: SHIPPED | OUTPATIENT
Start: 2019-06-13 | End: 2019-06-27

## 2019-06-13 NOTE — PROGRESS NOTES
Patient seen and examined. Patient is progressively increasing activity. Pt reports having low energy since surgery that has started to get better in the last week. In afib today and endorses palpitations.      Sternum: stable, incision CDI  EKG: Afib      Assessment:  S/P MVR 4/26/19     Plan:  Can begin driving as long as he has power steering  Can begin cardiac rehab in the next couple of weeks  We will refer to cardiology to assume care - Dr. Agustin Mccrary   Continue Lasix 40mg daily for 14 days   Continue potassium 20mEQ daily for 14 days        No scheduled appointment, RTC prn      I have seen the patient and reviewed the physician assistant's note above. I have personally interviewed and examined the patient at bedside and agree with the findings.     Mrs. Lewis is doing great after her reop sternotomy, MV replacement, maze, and left atrial appendage closure on April 26, 2019.  She has some mild lower extremity edema for which we will restart her on lasix 40mg daily for two weeks.  Also, she is in atrial fibrillation so I recommend cardioversion.  This can be done with her cardiologist Dr. Agustin Mccrary or here at [facility].  Please see my full note in the letter section of chart review.      Yonatan Mcintosh MD  Cardiothoracic Surgery  [facility]

## 2019-06-13 NOTE — LETTER
[facility]  [address]  [facility] [address]  Phone: [phone] June 13, 2019      Agustin Mccrary MD  [address] [facility] [place] [address]    Patient: Ericka Lewis   MR Number: [id number]   YOB: 1955   Date of Visit: 6/13/2019     Dear Dr. Mccrary,     It was a pleasure seeing your patient, Ms. Lewis, in our follow-up clinic today after her reoperative sternotomy, mitral valve replacement, Duran Maze procedure, and left atrial appendage closure on April 26, 2019.  Her postoperative recovery was normal and she is doing great.  Her most recent echocardiogram showed an ejection fraction of 55% with no mitral regurgitation.  She is a very active individual who walks daily and has no respiratory issues.    On examination today, her vital signs are normal and EKG shows atrial fibrillation with a heart rate in the 50s bpm.   The incision has healed very well and the sternum is stable.  Lungs are clear bilaterally and there is no heart murmur.  There is mild lower extremity edema for which we will restart her Lasix 40mg daily for two weeks.  For her atrial fibrillation s/p Duran Maze procedure, I recommended for her to undergo cardioversion.  This can either be done with you or here at [facility], whichever works best for your team.  She is now discharged from our clinic but please do not hesitate to contact me if there is any problem.    Thank you for your referral and for your trust and confidence in our [facility].  For convenience, attached below is a copy of the operative report.     Kindest regards,    Yonatan Mcintosh MD  Cardiothoracic Surgery  [facility]    SS/etb    [facility]  Cardiothoracic Surgery Operative Report     Patient Name:  Ericka Lewis; [id number]     Preoperative Diagnosis: mitral stenosis s/p mitral valve repair, paroxysmal atrial fibrillation     Postoperative Diagnosis:  Same     Date of Operation:   04/26/2019     Operation:  Mitral valve replacement (anterior leaflet chordal sparing and posterior leaflet sparing technique) with 29 mm Medtronic Mosaic porcine bioprosthesis  - Reoperative cardiac surgery  - Left atrial Duran Maze IV procedure with Atricure cryoablation  - Left atrial appendage closure     Surgeon:  Yonatan Mcintosh MD     Assistant Surgeon:  none     Fellow:  none     Anesthesiologist:  Meño Faulkner MD     ----------------------------------------------------------------------------------------------------------     Indications for surgery: Ms. Lewis is a 64 year-old woman with a history of mitral valve prolapse s/p mitral valve repair (May 2018) and now with severe mitral stenosis, as well as paroxysmal atrial fibrillation.  Recently, she has been symptomatic with dyspnea on exertion, lower extremity edema, and orthopnea symptoms interfering with her quality of life.  Her most recent echocardiogram (4/8/2019) showed 55% ejection fraction, severe mitral stenosis with mean transvalvular gradient of 10mmHg, no mitral regurgitation, trace tricuspid regurgitation, and pulmonary hypertension with estimated systolic PA pressure of 42mmHg.  Angiogram showed non-obstructive coronary artery disease with a right dominant system.  CT chest noncontrast showed safe re-entry for reoperation.  We had an extensive discussion with her regarding the ACC/AHA guidelines and we agreed that she has class I indications for surgery involving reoperative sternotomy, mitral valve replacement (bioprosthesis), possible mitral valve repair, possible tricuspid valve repair, and maze procedure with left atrial appendage removal.  The risks and benefits were explained and informed consent was obtained.      Gross findings: Severe pericardial adhesions. The mitral valve had minimal residual leaflet remaining and a small annuloplasty ring likely causing the stenosis.  Tricuspid valve with minimal regurgitation and non-dilated  annulus so no repair performed.  Left atrial appendage closed from the left atrial side using running 4-0 prolene suture.     Procedure:  The patient was brought to the holding area and the indications for surgery were reviewed.  She was placed supine on the operating room table and prepped and draped in the usual sterile fashion. A surgical time out was performed.     A midline incision was made on the chest where the previous scar was noted and deepened with electrocautery to expose the sternum.  The prior sternal wires and bands (likely zip-fix) were removed.  The sternum was divided with an oscillating saw and hemostasis was obtained. A chest retractor was placed and we began to carefully expose the inferior surface of the heart then the inferior vena cava, right atrium, superior vena cava, and aorta, using electrocautery and sharp dissection.   ACT guided heparinization was administered and the ascending aorta, superior vena cava, and inferior vena cava were cannulated.  Cardiopulmonary bypass was commenced and the remaining heart was exposed.  The ascending aorta was cannulated for administration of cardioplegia and a retrograde cardioplegia catheter was placed in the coronary sinus.   The cross-clamp was applied and 1500cc of antegrade cold blood cardioplegia was administered.  Subsequent doses of 500cc of retrograde cardioplegia were administered every 20 minutes.      Attention was turned to the left atrium.  Due to the prior mitral surgery, Sondergaard's groove was heavily scarred down.  Thus, the left atrium was opened through a biatrial-transeptal approach via a right atriotomy.  The cruz-maze IV was now performed using the cryoablation device on the left atrium.  The AtriCure cryo probe was placed to encircle the right sided pulmonary veins and was activated.  The cryoablation reached the target temperature and was performed for two minutes.  Next, the AtriCure probe encircled the left sided pulmonary  "veins and activated.  After reaching target temperature, the cryoablation was performed for two minutes. This was repeated for the connecting lesion between the pulmonary veins, then the mitral annulus/coronary sinus lesion, then the lesion to the left atrial appendage scar.  Afterwards, the left atrial appendage was located and found to be small and relatively flat.  Using two running 4-0 SH prolene sutures, the left atrial appendage was closed from the endocardial surface.  Dense adhesions on the epicardial surface of the heart prevented complete removal of the appendage.     Attention was turned to the mitral valve.  The mitral valve was noted to have minimal residual leaflet tissue remaining, making a successful re-repair very unlikely.  The annuloplasty ring was noted to be small in size.  The sutures on the annuloplasty ring were carefully removed with an eleven blade.  The ring was next removed with blunt and sharp dissection.  The anterior leaflet of the mitral valve was resected, sparing the chordal attachments which were then sutured to the 10 o'clock and 2 o'clock positions on the mitral annulus using pledgetted 2-0 braided non-absorbable sutures.  The annulus was sized to 29 mm and a 29 mm Medtronic Mosaic porcine bioprosthesis was selected and prepared.  Next, the posterior leaflet was spared and sutured to the mitral annulus using pledgetted 2-0 braided non-absorbable sutures.  Now, the remaining annular sutures were placed circumferentially around the mitral annulus and then through the sewing ring of the mitral bioprosthesis and tied.  Saline testing revealed perfect coaptation of the valve without evidence of paravalvular leak.. The left atrial roof and interatrial septum was closed with continuous 4-0 prolene suture, followed by closure of the right atriotomy.     A dose of warm "hot shot" cardioplegia was given retrograde.  Air was evacuated and the cross-clamp was removed. All anastomoses were " checked for hemostasis and the patient was weaned from bypass on minimal inotropic support.  The total cardiopulmonary bypass time was 186 minutes and cross clamp time was 158 minutes.  The cannulae were removed and heparin was reversed.  Temporary ventricular pacing wires were placed on the heart.  Drainage tubes were placed behind the sternum and in the pleural spaces. The chest was closed with steel wires and the soft tissue was closed with absorbable suture.  A sterile dressing was applied and the patient was brought to the ICU in stable condition.       I was present in the operating room for the entire operation and immediately available thereafter.

## 2019-06-13 NOTE — PROGRESS NOTES
Pt here for post-op appointment. Reminded patient to keep incision clean and dry, and to adhere to the 5 lb weight restriction for 6 wks post-surgery, then 20lb restriction for 6 additional wks. Patient verbalized understanding. Patient cleared to drive. Patient already set up appt with Cardiologist, Dr. Mccrary  for next week. Also, patient made aware that Phase II Cardiac Rehab orders will be placed, and the [facility] will reach out to them.

## 2020-05-18 RX ORDER — WARFARIN 3 MG/1
TABLET ORAL
Qty: 90 TABLET | Refills: 3 | OUTPATIENT
Start: 2020-05-18

## 2021-05-12 ENCOUNTER — PATIENT MESSAGE (OUTPATIENT)
Dept: RESEARCH | Facility: HOSPITAL | Age: 66
End: 2021-05-12

## 2024-02-05 ENCOUNTER — TELEPHONE (OUTPATIENT)
Dept: CARDIOTHORACIC SURGERY | Facility: CLINIC | Age: 69
End: 2024-02-05
Payer: MEDICARE

## 2024-02-05 NOTE — TELEPHONE ENCOUNTER
Pt called with a question about a scar revision. Returned call and left message.    Julie Haase RN  [facility] Nurse Navigator [phone]

## 2024-02-06 ENCOUNTER — TELEPHONE (OUTPATIENT)
Dept: CARDIOTHORACIC SURGERY | Facility: CLINIC | Age: 69
End: 2024-02-06
Payer: MEDICARE

## 2024-02-06 NOTE — TELEPHONE ENCOUNTER
Pt called to see if she was able to go to plastic surgeon to have a procedure done on her keloid scar. Her cardiologist recommended calling Dr Mcintosh's office to see if there was anything in her CTS surgery that would prohibit her from getting plastic surgery for the keloid. Discussed with Dr Mcintosh who stated patient should go to plastics and there was no contraindications related to her surgery.    Julie Haase RN  [facility] Nurse Navigator [phone]

## 2024-09-25 NOTE — PLAN OF CARE
05/02/19 1315   Final Note   Assessment Type Final Discharge Note   Anticipated Discharge Disposition Home   What phone number can be called within the next 1-3 days to see how you are doing after discharge? [phone]   Hospital Follow Up  Appt(s) scheduled? Yes   Discharge plans and expectations educations in teach back method with documentation complete? Yes   Right Care Referral Info   Post Acute Recommendation No Care     Pt is discharged to home in care of her spouse. Faxed H&P, Operative note, and Discharge summary to Dr. Agustin Nunez [phone] for his review. The patient will need INR lab draw in his office for coumadin monitoring for 3 months for MVR. Goal is 2-3.      Problem: Mobility  Goal: STG-Within one week, patient will ambulate community distances in different surfaces, independent x 1500'  Outcome: Met     Problem: PT-Long Term Goals  Goal: LTG- Patient will be able to participate in community outing and reintegration.  Outcome: Met

## (undated) DEVICE — GLOVE BIOGEL PI MICRO SZ 7.5

## (undated) DEVICE — KIT PROBE COVER WITH GEL

## (undated) DEVICE — KIT URINARY CATH URINE METER

## (undated) DEVICE — NDL 18GA X1 1/2 REG BEVEL

## (undated) DEVICE — BLADE 4IN EDGE INSULATED

## (undated) DEVICE — KIT SAHARA DRAPE DRAW/LIFT

## (undated) DEVICE — ELECTRODE REM PLYHSV RETURN 9

## (undated) DEVICE — SEE MEDLINE ITEM 157117

## (undated) DEVICE — BLADE ELECTRO EDGE INSULATED

## (undated) DEVICE — DRAIN CHEST DRY SUCTION

## (undated) DEVICE — PROBE CRYO ABLATION 10CM

## (undated) DEVICE — SUT MONOCRYL 4-0 PS-1 UND

## (undated) DEVICE — INSERTS STEALTH FIBRA SIZE 5

## (undated) DEVICE — SEE MEDLINE ITEM 156894

## (undated) DEVICE — SUT SILK BLK BR. 2 2-60

## (undated) DEVICE — CANNULA VESSEL FREE FLOW

## (undated) DEVICE — CANNULA IMA 1MM

## (undated) DEVICE — TAPE ADH MEDIPORE 4 X 10YDS

## (undated) DEVICE — GLOVE SURG BIOGEL LATEX SZ 7.5

## (undated) DEVICE — CLOSURE SKIN STERI STRIP 1/4X3

## (undated) DEVICE — HEMOSTAT SURGICEL NU-KNIT 6X9

## (undated) DEVICE — WIRE INTRAMYOCARDIAL TEMP

## (undated) DEVICE — SUT PROLENE 4-0 SH BLU 36IN

## (undated) DEVICE — KIT CUSTOM MANIFOLD

## (undated) DEVICE — DRAPE SLUSH WARMER WITH DISC

## (undated) DEVICE — SEE MEDLINE ITEM 146417

## (undated) DEVICE — GAUZE SPONGE 4X4 12PLY

## (undated) DEVICE — SUT 2/0 30IN SILK BLK BRAI

## (undated) DEVICE — Device

## (undated) DEVICE — FOGERTY SOFT JAW DISP 2/PK

## (undated) DEVICE — DRAIN CHANNEL ROUND 19FR

## (undated) DEVICE — SHEATH INTRODUCER 7FR 11CM

## (undated) DEVICE — TRAY HEART

## (undated) DEVICE — STOPCOCK 3-WAY

## (undated) DEVICE — SUT SILK 2-0 SH 18IN BLACK

## (undated) DEVICE — DRAPE SPLIT STERILE

## (undated) DEVICE — SUT PROLENE 4-0 RB-1 BL MO

## (undated) DEVICE — SUT PROLENE 3-0 30 RB-1 BL

## (undated) DEVICE — KIT MICROINTRO 4F .018X40X7CM

## (undated) DEVICE — LOOP VESSEL BLUE MAXI

## (undated) DEVICE — RETRACTOR OCTOBASE INSERT HOLD

## (undated) DEVICE — GAUZE SPONGE 4'X4 12 PLY

## (undated) DEVICE — SET DECANTER MEDICHOICE

## (undated) DEVICE — DRESSING AQUACEL SACRAL 9 X 9

## (undated) DEVICE — SYR 3CC LUER LOC

## (undated) DEVICE — DRESSING ADH ISLAND 3.6 X 14

## (undated) DEVICE — SUT 6 18IN STEEL MONO CCS

## (undated) DEVICE — ADHESIVE DERMABOND ADVANCED

## (undated) DEVICE — PAD K-THERMIA 24IN X 60IN

## (undated) DEVICE — LINE 60IN PRESSURE MON.

## (undated) DEVICE — PLEDGET SUT SOFT 3/8X3/16X1/16

## (undated) DEVICE — CATH WEDGE 7FRX110CM

## (undated) DEVICE — TUBING HF INSUFFLATION W/ FLTR

## (undated) DEVICE — SUT CTD VICRYL 0 UND CR/CTX

## (undated) DEVICE — SUT ETHIBOND EXCEL 2-0 SH-2